# Patient Record
Sex: MALE | Race: BLACK OR AFRICAN AMERICAN | NOT HISPANIC OR LATINO | Employment: OTHER | URBAN - METROPOLITAN AREA
[De-identification: names, ages, dates, MRNs, and addresses within clinical notes are randomized per-mention and may not be internally consistent; named-entity substitution may affect disease eponyms.]

---

## 2017-03-20 ENCOUNTER — TELEPHONE (OUTPATIENT)
Dept: INTERNAL MEDICINE | Facility: CLINIC | Age: 27
End: 2017-03-20

## 2017-03-20 DIAGNOSIS — Z00.00 ROUTINE GENERAL MEDICAL EXAMINATION AT A HEALTH CARE FACILITY: Primary | ICD-10-CM

## 2017-04-12 ENCOUNTER — LAB VISIT (OUTPATIENT)
Dept: LAB | Facility: HOSPITAL | Age: 27
End: 2017-04-12
Attending: INTERNAL MEDICINE
Payer: MEDICAID

## 2017-04-12 DIAGNOSIS — Z00.00 ROUTINE GENERAL MEDICAL EXAMINATION AT A HEALTH CARE FACILITY: ICD-10-CM

## 2017-04-12 LAB
25(OH)D3+25(OH)D2 SERPL-MCNC: 21 NG/ML
ALBUMIN SERPL BCP-MCNC: 4.5 G/DL
ALP SERPL-CCNC: 43 U/L
ALT SERPL W/O P-5'-P-CCNC: 41 U/L
ANION GAP SERPL CALC-SCNC: 9 MMOL/L
ANISOCYTOSIS BLD QL SMEAR: SLIGHT
AST SERPL-CCNC: 25 U/L
BASOPHILS # BLD AUTO: 0.04 K/UL
BASOPHILS NFR BLD: 0.7 %
BILIRUB SERPL-MCNC: 0.6 MG/DL
BUN SERPL-MCNC: 13 MG/DL
CALCIUM SERPL-MCNC: 10.1 MG/DL
CHLORIDE SERPL-SCNC: 106 MMOL/L
CHOLEST/HDLC SERPL: 4.3 {RATIO}
CO2 SERPL-SCNC: 25 MMOL/L
CREAT SERPL-MCNC: 0.9 MG/DL
DACRYOCYTES BLD QL SMEAR: ABNORMAL
DIFFERENTIAL METHOD: ABNORMAL
EOSINOPHIL # BLD AUTO: 0.3 K/UL
EOSINOPHIL NFR BLD: 4.9 %
ERYTHROCYTE [DISTWIDTH] IN BLOOD BY AUTOMATED COUNT: 17.8 %
EST. GFR  (AFRICAN AMERICAN): >60 ML/MIN/1.73 M^2
EST. GFR  (NON AFRICAN AMERICAN): >60 ML/MIN/1.73 M^2
GLUCOSE SERPL-MCNC: 109 MG/DL
HCT VFR BLD AUTO: 36.5 %
HDL/CHOLESTEROL RATIO: 23.4 %
HDLC SERPL-MCNC: 197 MG/DL
HDLC SERPL-MCNC: 46 MG/DL
HGB BLD-MCNC: 11.8 G/DL
HYPOCHROMIA BLD QL SMEAR: ABNORMAL
LDLC SERPL CALC-MCNC: 123.6 MG/DL
LYMPHOCYTES # BLD AUTO: 2.8 K/UL
LYMPHOCYTES NFR BLD: 46.6 %
MCH RBC QN AUTO: 22.2 PG
MCHC RBC AUTO-ENTMCNC: 32.3 %
MCV RBC AUTO: 69 FL
MONOCYTES # BLD AUTO: 0.3 K/UL
MONOCYTES NFR BLD: 4.9 %
NEUTROPHILS # BLD AUTO: 2.5 K/UL
NEUTROPHILS NFR BLD: 42.9 %
NONHDLC SERPL-MCNC: 151 MG/DL
OVALOCYTES BLD QL SMEAR: ABNORMAL
PLATELET # BLD AUTO: 234 K/UL
PLATELET BLD QL SMEAR: ABNORMAL
PMV BLD AUTO: 10 FL
POIKILOCYTOSIS BLD QL SMEAR: SLIGHT
POTASSIUM SERPL-SCNC: 3.8 MMOL/L
PROT SERPL-MCNC: 8 G/DL
RBC # BLD AUTO: 5.31 M/UL
SODIUM SERPL-SCNC: 140 MMOL/L
STOMATOCYTES BLD QL SMEAR: PRESENT
TRIGL SERPL-MCNC: 137 MG/DL
TSH SERPL DL<=0.005 MIU/L-ACNC: 1.12 UIU/ML
WBC # BLD AUTO: 5.9 K/UL

## 2017-04-12 PROCEDURE — 82306 VITAMIN D 25 HYDROXY: CPT

## 2017-04-12 PROCEDURE — 80053 COMPREHEN METABOLIC PANEL: CPT

## 2017-04-12 PROCEDURE — 85025 COMPLETE CBC W/AUTO DIFF WBC: CPT

## 2017-04-12 PROCEDURE — 84443 ASSAY THYROID STIM HORMONE: CPT

## 2017-04-12 PROCEDURE — 83036 HEMOGLOBIN GLYCOSYLATED A1C: CPT

## 2017-04-12 PROCEDURE — 36415 COLL VENOUS BLD VENIPUNCTURE: CPT

## 2017-04-12 PROCEDURE — 80061 LIPID PANEL: CPT

## 2017-04-13 LAB
ESTIMATED AVG GLUCOSE: 126 MG/DL
HBA1C MFR BLD HPLC: 6 %

## 2017-04-17 RX ORDER — ATORVASTATIN CALCIUM 40 MG/1
TABLET, FILM COATED ORAL
Qty: 90 TABLET | Refills: 0 | Status: SHIPPED | OUTPATIENT
Start: 2017-04-17 | End: 2017-07-15 | Stop reason: SDUPTHER

## 2017-04-18 ENCOUNTER — OFFICE VISIT (OUTPATIENT)
Dept: INTERNAL MEDICINE | Facility: CLINIC | Age: 27
End: 2017-04-18
Payer: MEDICAID

## 2017-04-18 ENCOUNTER — HOSPITAL ENCOUNTER (OUTPATIENT)
Dept: RADIOLOGY | Facility: HOSPITAL | Age: 27
Discharge: HOME | End: 2017-04-18
Attending: STUDENT IN AN ORGANIZED HEALTH CARE EDUCATION/TRAINING PROGRAM
Payer: MEDICAID

## 2017-04-18 VITALS
TEMPERATURE: 99 F | WEIGHT: 270.06 LBS | SYSTOLIC BLOOD PRESSURE: 124 MMHG | RESPIRATION RATE: 16 BRPM | HEIGHT: 76 IN | BODY MASS INDEX: 32.89 KG/M2 | DIASTOLIC BLOOD PRESSURE: 81 MMHG | HEART RATE: 60 BPM

## 2017-04-18 DIAGNOSIS — E55.9 VITAMIN D DEFICIENCY: ICD-10-CM

## 2017-04-18 DIAGNOSIS — Z00.00 ANNUAL PHYSICAL EXAM: Primary | ICD-10-CM

## 2017-04-18 DIAGNOSIS — M79.645 THUMB PAIN, LEFT: ICD-10-CM

## 2017-04-18 DIAGNOSIS — L30.9 ECZEMA, UNSPECIFIED TYPE: ICD-10-CM

## 2017-04-18 DIAGNOSIS — D64.9 ANEMIA, UNSPECIFIED TYPE: ICD-10-CM

## 2017-04-18 PROCEDURE — 73130 X-RAY EXAM OF HAND: CPT | Mod: TC,PO,LT

## 2017-04-18 PROCEDURE — 99395 PREV VISIT EST AGE 18-39: CPT | Mod: S$PBB,,, | Performed by: STUDENT IN AN ORGANIZED HEALTH CARE EDUCATION/TRAINING PROGRAM

## 2017-04-18 PROCEDURE — 99999 PR PBB SHADOW E&M-EST. PATIENT-LVL III: CPT | Mod: PBBFAC,,,

## 2017-04-18 PROCEDURE — 73130 X-RAY EXAM OF HAND: CPT | Mod: 26,LT,, | Performed by: RADIOLOGY

## 2017-04-18 NOTE — PROGRESS NOTES
Patient seen and examined with resident and agree with assessment and plan.    1.  Annual - reviewed blood with patient.  Up-to-date on vaccines.  No need for colon or prostate cancer screening.  2.  Vit D - vitamin D 2000 units daily.  3.  Anemia - iron studies ordered, check thalassemia and sickle cell screens.  4.  Eczema - refer to dermatology.  Does not seem to be consistent with eczema.  5.  Thumb pain, left - check x-ray left hand.    Return to clinic in 1 year or sooner if needed.

## 2017-04-18 NOTE — PROGRESS NOTES
26 y.o.  Male here for annual exam.     Cholesterol: (2017)  Vaccines: Influenza (unknown); Tetanus (2010)  Sexual Screening: NA  STD screening: NA  Eye exam: will get eye exam soon (hasnt had one in 12 months)  HbA1c: 6.0%    Exercise: Patient states that he has never been a big exercise fan  Diet: Cutting back on portions       ROS  Constitutional: denies unintentional weight loss, night sweats, fever or chills  Eyes: denies visual changes  ENT: denies nasal congestion, ear pain or sore throat  Respiratory: denies cough, dyspnea on exertion and pleurisy  Cardiovascular: denies chest pain, chest pressure/discomfort, dyspnea, exertional chest pressure/discomfort, palpitations, lower extremity edema, orthopnea and palpitations  Gastrointestinal: denies nausea or vomiting, abdominal pain or change in bowel habits  Genitourinary: denies hematuria or dysuria  Musculoskeletal: arthralgias of the L Thumb Or denies myalgias  Neurological: denies seizures or tremors  Behavioral/Psych: denies auditory or visual hallucinations, SI, HI     Past Medical History:   Diagnosis Date    Diabetes mellitus, type 2     Hyperlipidemia     Hypertension     Obesity      No past surgical history on file.  Social History     Social History    Marital status: Single     Spouse name: N/A    Number of children: N/A    Years of education: N/A     Occupational History    Not on file.     Social History Main Topics    Smoking status: Never Smoker    Smokeless tobacco: Never Used    Alcohol use No    Drug use: No    Sexual activity: Not on file     Other Topics Concern    Not on file     Social History Narrative     Review of patient's allergies indicates:  No Known Allergies  Mr. Wynne does not currently have medications on file.     Physical Exam  Constitutional: Well-developed, Obese young man in non-distressed,   HENT: NC/AT, external ears normal, oropharynx clear, MMM w/o exudates.   Eyes: PERRL, EOMI, conjunctiva normal, no  discharge b/l, no scleral icterus   Neck: normal ROM, supple, (-) thyromegaly/nodules, (-) JVD,(-) cervical adenopathy   CV: RRR, no m/r/g, no carotid bruits, +2 peripheral pulses.  Pulmonary/Chest wall: Breathing comfortably w/o distress, CTAB, no w/r/r, no crackles.  GI: Soft, non-tender, non-distended, (+) BS, (+) BM   Musculoskeletal: Normal ROM, no edema, no atrophy, no tenderness throughout   Neurological: AAO x 4, CN II-XI in tact, nl sensation, nl strength/tone   Skin: warm, dry, (-) erythema, (-)pallor, (-) acanthosis.   Psych: minimal conversing, patient answered everything with yes or no, or gave extremely short explanations 2/2 to previous diagnosed mental retardation vs developmental disorder    ASSESSMENT AND PLAN    #Annual Physical Exam  -Patients vaccinations are UTD  -Blood work shows anemia and better controlled lipid profile  -repeat Fe studies, sickle cell screen and thal screen     #Hyperlipidemia  - CMP - shows no acute abnormalities  - Lipid panel is wnl  - Continue taking atorvastatin (LIPITOR) 40 MG tablet; Take 1 tablet (40 mg total) by mouth once daily.  - Encouraged continue healthy diet and exercising     #Vitamin D Deficiency  -patients VitD is 21  -will start pt on 2000U of VitD3 OTC      #Eczema  - continue using hydrocortisone 0.2 % cream; Apply topically daily as needed  -will refer patient to dermatologist for assessment of rash     #Anemia  - MVI containing iron seems to be slightly improving anemia  -continue MVI    #L Thumb pain  -patient states he jammed it >1year ago  -will obtain L hand moe Rader MD  Internal Medicine, PGY1  Pager [phone]

## 2017-04-18 NOTE — PATIENT INSTRUCTIONS
Seun is to take 2000U of Vitamin D over the counter daily and to try to exercise 30 minutes, 1 time per week

## 2017-04-26 DIAGNOSIS — L30.9 ECZEMA: ICD-10-CM

## 2017-04-27 RX ORDER — HYDROCORTISONE VALERATE CREAM 2 MG/G
CREAM TOPICAL
Qty: 60 G | Refills: 5 | Status: SHIPPED | OUTPATIENT
Start: 2017-04-27 | End: 2017-10-10 | Stop reason: SDUPTHER

## 2017-04-27 NOTE — TELEPHONE ENCOUNTER
----- Message from Clive Figueredo MD sent at 4/18/2017  3:16 PM CDT -----  No abnormality noted to explain pain.

## 2017-04-28 ENCOUNTER — TELEPHONE (OUTPATIENT)
Dept: INTERNAL MEDICINE | Facility: CLINIC | Age: 27
End: 2017-04-28

## 2017-04-28 DIAGNOSIS — D64.9 ANEMIA, UNSPECIFIED TYPE: Primary | ICD-10-CM

## 2017-04-28 NOTE — TELEPHONE ENCOUNTER
Attempted first home number to speak with Seun.  No answer and voice mailbox was full.  Second number was called and mother (Alis Wynne) picked up.  I discussed with her Seun's iron panel results and how I felt that a hematology referral was appropriate to make sure the reason for Seun's anemia was not being missed.  Mother agreed to referral.  Ambulatory referral to hematology/oncology was made.  Pt's mother informed to call office with any further questions.

## 2017-05-09 ENCOUNTER — TELEPHONE (OUTPATIENT)
Dept: INTERNAL MEDICINE | Facility: CLINIC | Age: 27
End: 2017-05-09

## 2017-05-09 NOTE — TELEPHONE ENCOUNTER
I called Mr. Wynne's guardian, his mother [phone]/Alis on 05/09/17 at 1735.    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  This deletion is one of the most common alpha thalassemia mutations. It is often seen in people with , [place] , [place],  and Mediterranean heritage and is associated with mild microcytic anemia.     I also explained that single alpha globin gene deletions are typically clinically harmless but that this most likely accounts for his low hemoglobin (microcytic anemia). I also explained that this deletion can carry some reproductive risk (if Mr. Wynne's offspring co-inherits a  alpha thalassemia deletion). I stressed that this is harmless but to call back if she has any questions.    Irma Rader MD  Internal Medicine, PGY1  Pager [phone]

## 2017-06-12 ENCOUNTER — TELEPHONE (OUTPATIENT)
Dept: DERMATOLOGY | Facility: CLINIC | Age: 27
End: 2017-06-12

## 2017-07-17 RX ORDER — ATORVASTATIN CALCIUM 40 MG/1
TABLET, FILM COATED ORAL
Qty: 90 TABLET | Refills: 0 | Status: SHIPPED | OUTPATIENT
Start: 2017-07-17 | End: 2017-07-20 | Stop reason: SDUPTHER

## 2017-07-18 RX ORDER — ATORVASTATIN CALCIUM 40 MG/1
TABLET, FILM COATED ORAL
Qty: 90 TABLET | Refills: 0 | Status: SHIPPED | OUTPATIENT
Start: 2017-07-18 | End: 2017-07-20

## 2017-07-20 RX ORDER — ATORVASTATIN CALCIUM 40 MG/1
40 TABLET, FILM COATED ORAL DAILY
Qty: 90 TABLET | Refills: 3 | Status: SHIPPED | OUTPATIENT
Start: 2017-07-20 | End: 2018-07-13 | Stop reason: SDUPTHER

## 2017-07-20 NOTE — TELEPHONE ENCOUNTER
----- Message from Innaisabelle Randolphs sent at 7/20/2017  2:03 PM CDT -----  Contact: Alis/mother/[phone]      RX request - refill or new RX.  Is this a refill or new RX:  Refill  RX name and strength: atorvastatin (LIPITOR) 40 MG tablet  Directions: TAKE ONE TABLET BY MOUTH ONCE DAILY  Is this a 30 day or 90 day RX:  90  Pharmacy name and phone #: [facility] [place], [place] - [address] [place] [address]  Comments:  Please call and advise.       Thank you!!!

## 2017-08-09 ENCOUNTER — TELEPHONE (OUTPATIENT)
Dept: INTERNAL MEDICINE | Facility: CLINIC | Age: 27
End: 2017-08-09

## 2017-08-09 DIAGNOSIS — R21 RASH: Primary | ICD-10-CM

## 2017-08-09 NOTE — TELEPHONE ENCOUNTER
----- Message from Ryanne Minor sent at 8/9/2017  2:11 PM CDT -----  Contact: Mother Alis [phone]  Patient would like to get a referral.  Does the patient already have the specialty clinic appointment scheduled:  No  If yes, what date is the appointment scheduled:     Referral to what specialty:  Dermatology  Reason (be specific):  Rash on both legs and arms  Does the patient want the referral with a specific physician:  [facility]  Is this an [facility] or [facility]:  [facility]  Comments:

## 2017-10-10 DIAGNOSIS — L30.9 ECZEMA, UNSPECIFIED TYPE: ICD-10-CM

## 2017-10-10 RX ORDER — HYDROCORTISONE VALERATE CREAM 2 MG/G
CREAM TOPICAL
Qty: 60 G | Refills: 5 | Status: SHIPPED | OUTPATIENT
Start: 2017-10-10 | End: 2018-06-15

## 2018-04-20 ENCOUNTER — HOSPITAL ENCOUNTER (OUTPATIENT)
Dept: RADIOLOGY | Facility: HOSPITAL | Age: 28
Discharge: HOME | End: 2018-04-20
Attending: INTERNAL MEDICINE
Payer: MEDICAID

## 2018-04-20 ENCOUNTER — OFFICE VISIT (OUTPATIENT)
Dept: INTERNAL MEDICINE | Facility: CLINIC | Age: 28
End: 2018-04-20
Payer: MEDICAID

## 2018-04-20 VITALS
WEIGHT: 236.13 LBS | RESPIRATION RATE: 16 BRPM | DIASTOLIC BLOOD PRESSURE: 92 MMHG | SYSTOLIC BLOOD PRESSURE: 120 MMHG | TEMPERATURE: 98 F | HEIGHT: 76 IN | HEART RATE: 60 BPM | BODY MASS INDEX: 28.76 KG/M2

## 2018-04-20 DIAGNOSIS — Z00.00 ANNUAL PHYSICAL EXAM: Primary | ICD-10-CM

## 2018-04-20 DIAGNOSIS — F70 MILD MENTAL RETARDATION: ICD-10-CM

## 2018-04-20 DIAGNOSIS — G89.29 CHRONIC PAIN OF LEFT THUMB: ICD-10-CM

## 2018-04-20 DIAGNOSIS — M79.645 CHRONIC PAIN OF LEFT THUMB: ICD-10-CM

## 2018-04-20 DIAGNOSIS — R10.13 EPIGASTRIC ABDOMINAL PAIN: ICD-10-CM

## 2018-04-20 DIAGNOSIS — R68.81 EARLY SATIETY: ICD-10-CM

## 2018-04-20 DIAGNOSIS — R14.0 ABDOMINAL DISTENSION: ICD-10-CM

## 2018-04-20 DIAGNOSIS — E78.2 MIXED HYPERLIPIDEMIA: ICD-10-CM

## 2018-04-20 PROCEDURE — 73140 X-RAY EXAM OF FINGER(S): CPT | Mod: 26,LT,, | Performed by: RADIOLOGY

## 2018-04-20 PROCEDURE — 73140 X-RAY EXAM OF FINGER(S): CPT | Mod: TC,PO,LT

## 2018-04-20 PROCEDURE — 99999 PR PBB SHADOW E&M-EST. PATIENT-LVL IV: CPT | Mod: PBBFAC,,, | Performed by: INTERNAL MEDICINE

## 2018-04-20 PROCEDURE — 99214 OFFICE O/P EST MOD 30 MIN: CPT | Mod: PBBFAC,25,PO | Performed by: INTERNAL MEDICINE

## 2018-04-20 PROCEDURE — 99395 PREV VISIT EST AGE 18-39: CPT | Mod: S$PBB,,, | Performed by: INTERNAL MEDICINE

## 2018-04-20 NOTE — PROGRESS NOTES
Subjective:      Suen Wynne is a 27 y.o. male who presents for annual exam.  He is here with his mother Alis Wynne.    History of developmental delay, mild MR, mother mentioned being told patient has asperger syndrome. She is concerned that he is not interactive with family. He is very avoidant and does not speak to his mother very much.     Family History:  family history includes Cancer in his maternal aunt; Diabetes in his maternal grandfather; Hypertension in his father, maternal grandfather, mother, and paternal grandfather.    Health Maintenance:  Health Maintenance       Date Due Completion Date    Influenza Vaccine 08/01/2017 ---    TETANUS VACCINE 10/28/2020 10/28/2010        Eye exam: no issues  Tetanus: 10/2010    Body mass index is 28.74 kg/m².    Meds:   Current Outpatient Prescriptions:     atorvastatin (LIPITOR) 40 MG tablet, Take 1 tablet (40 mg total) by mouth once daily., Disp: 90 tablet, Rfl: 3    hydrocortisone (WESTCORT) 0.2 % cream, APPLY  CREAM TOPICALLY TO AFFECTED AREA ONCE DAILY AS NEEDED, Disp: 60 g, Rfl: 5    desonide (DESOWEN) 0.05 % cream, Apply topically 2 (two) times daily., Disp: 60 g, Rfl: 1    PMHx:   Past Medical History:   Diagnosis Date    Diabetes mellitus, type 2     Hyperlipidemia     Hypertension     Obesity        PSHx:   No past surgical history on file.    SocHx:   Social History     Social History    Marital status: Single     Spouse name: N/A    Number of children: N/A    Years of education: N/A     Social History Main Topics    Smoking status: Never Smoker    Smokeless tobacco: Never Used    Alcohol use No    Drug use: No    Sexual activity: Not Asked     Other Topics Concern    None     Social History Narrative    None       Review of Systems   Unable to perform ROS: Other (does not speak more than a few words)   Constitutional: Positive for appetite change (does not eat much because he feels full quickly). Negative for chills and fever.    HENT: Negative for congestion and sinus pressure.    Eyes: Negative for visual disturbance.   Respiratory: Negative for cough and shortness of breath.    Cardiovascular: Negative for chest pain.   Gastrointestinal: Positive for abdominal distention and abdominal pain. Negative for nausea and vomiting.   Musculoskeletal: Positive for arthralgias (left thumb pain). Negative for myalgias.   Skin: Negative for rash.   Neurological: Positive for headaches (relieved by tylenol). Negative for dizziness and weakness.   Psychiatric/Behavioral: The patient is nervous/anxious.        Objective:      Physical Exam   Constitutional: He is oriented to person, place, and time. Vital signs are normal. He appears well-developed and well-nourished. No distress.   HENT:   Head: Normocephalic and atraumatic.   Right Ear: Hearing, tympanic membrane, external ear and ear canal normal. Tympanic membrane is not erythematous and not bulging.   Left Ear: Hearing, tympanic membrane, external ear and ear canal normal. Tympanic membrane is not erythematous and not bulging.   Nose: Nose normal.   Mouth/Throat: Uvula is midline, oropharynx is clear and moist and mucous membranes are normal. No oropharyngeal exudate or posterior oropharyngeal erythema.   Eyes: Conjunctivae, EOM and lids are normal. Pupils are equal, round, and reactive to light. No scleral icterus.   Neck: Normal range of motion. Neck supple. No thyroid mass and no thyromegaly present.   Cardiovascular: Normal rate, regular rhythm, normal heart sounds, intact distal pulses and normal pulses.    No murmur heard.  Pulmonary/Chest: Effort normal and breath sounds normal. He has no wheezes.   Abdominal: Soft. Bowel sounds are normal. He exhibits no distension. There is tenderness in the epigastric area. There is no rigidity, no rebound and no guarding.   Musculoskeletal: Normal range of motion. He exhibits no edema.   Lymphadenopathy:     He has no cervical adenopathy.        Right:  No supraclavicular adenopathy present.        Left: No supraclavicular adenopathy present.   Neurological: He is alert and oriented to person, place, and time. He has normal reflexes. He displays normal reflexes. Coordination and gait normal.   Skin: Skin is warm, dry and intact. No rash noted.   Psychiatric: His mood appears anxious. He is noncommunicative.   Vitals reviewed.      Assessment:       1. Annual physical exam    2. Mixed hyperlipidemia    3. Epigastric abdominal pain    4. Early satiety    5. Abdominal distension    6. Chronic pain of left thumb    7. Mild mental retardation        Plan:       1. Annual physical exam  - CBC auto differential; Future  - Comprehensive metabolic panel; Future  - Hemoglobin A1c; Future  - Lipid panel; Future  - TSH; Future  - Vitamin D; Future    2. Mixed hyperlipidemia  - stable, continue lipitor  - Lipid panel; Future    3. Epigastric abdominal pain  - trial of pepcid daily for 1 week  - H. PYLORI ANTIBODY, IGG; Future  - Ambulatory Referral to Gastroenterology    4. Early satiety  - Ambulatory Referral to Gastroenterology    5. Abdominal distension  - Ambulatory Referral to Gastroenterology    6. Chronic pain of left thumb  - X-Ray Finger 2 or More Views Left; Future  - Ambulatory Referral to Orthopedics    7. Mild mental retardation  - mother is interested in further diagnosis resources to assist      RTC in 3 months or sooner if needed    Karla Arce MD

## 2018-04-23 ENCOUNTER — TELEPHONE (OUTPATIENT)
Dept: INTERNAL MEDICINE | Facility: CLINIC | Age: 28
End: 2018-04-23

## 2018-04-23 DIAGNOSIS — M79.644 CHRONIC PAIN OF RIGHT THUMB: Primary | ICD-10-CM

## 2018-04-23 DIAGNOSIS — G89.29 CHRONIC PAIN OF RIGHT THUMB: Primary | ICD-10-CM

## 2018-04-23 DIAGNOSIS — S69.91XS HAND INJURY, RIGHT, SEQUELA: ICD-10-CM

## 2018-04-23 NOTE — TELEPHONE ENCOUNTER
----- Message from Malia Barton sent at 4/23/2018 11:12 AM CDT -----  Patient's mother is wanting an Orthopedic external referral to [facility].  Once entered bring to me to get auth.  Please also include imaging, clinic notes related to diagnosis.    Chronic pain of left thumb [M79.645, G89.29]    ThanksAmarilys

## 2018-05-09 ENCOUNTER — INITIAL CONSULT (OUTPATIENT)
Dept: GASTROENTEROLOGY | Facility: CLINIC | Age: 28
End: 2018-05-09
Payer: MEDICAID

## 2018-05-09 ENCOUNTER — TELEPHONE (OUTPATIENT)
Dept: GASTROENTEROLOGY | Facility: CLINIC | Age: 28
End: 2018-05-09

## 2018-05-09 VITALS
BODY MASS INDEX: 28.13 KG/M2 | WEIGHT: 231 LBS | HEART RATE: 60 BPM | RESPIRATION RATE: 18 BRPM | DIASTOLIC BLOOD PRESSURE: 74 MMHG | HEIGHT: 76 IN | SYSTOLIC BLOOD PRESSURE: 106 MMHG

## 2018-05-09 DIAGNOSIS — K21.9 GASTROESOPHAGEAL REFLUX DISEASE, ESOPHAGITIS PRESENCE NOT SPECIFIED: ICD-10-CM

## 2018-05-09 DIAGNOSIS — R68.81 EARLY SATIETY: ICD-10-CM

## 2018-05-09 DIAGNOSIS — R10.13 DYSPEPSIA: ICD-10-CM

## 2018-05-09 DIAGNOSIS — R11.2 NAUSEA AND VOMITING, INTRACTABILITY OF VOMITING NOT SPECIFIED, UNSPECIFIED VOMITING TYPE: ICD-10-CM

## 2018-05-09 DIAGNOSIS — R10.13 ABDOMINAL PAIN, EPIGASTRIC: Primary | ICD-10-CM

## 2018-05-09 DIAGNOSIS — R63.4 WEIGHT LOSS: ICD-10-CM

## 2018-05-09 PROCEDURE — 99203 OFFICE O/P NEW LOW 30 MIN: CPT | Mod: S$GLB,,, | Performed by: NURSE PRACTITIONER

## 2018-05-09 RX ORDER — PANTOPRAZOLE SODIUM 40 MG/1
40 TABLET, DELAYED RELEASE ORAL DAILY
Qty: 30 TABLET | Refills: 3 | Status: ON HOLD | OUTPATIENT
Start: 2018-05-09 | End: 2018-06-19

## 2018-05-09 NOTE — PROGRESS NOTES
Subjective:       Patient ID: Seun Wynne is a 27 y.o. male.    Chief Complaint: Abdominal Pain; Nausea; Emesis; and feeling full right after eating a small amount    HPI  Presents with his mother who helps answer some questions.  He has mild mental retardation and possibly Asperger's.   Patient answers appropriately but slowly.  Reports epigastric pain and fullness after eating with early satiety for at least one year.  Weight loss of at least 40 pounds over the last year and according to records 70 pounds over the last two years.  Reports nausea and vomiting.  Indigestion and belching after eating.  Reports heartburn and acid reflux.  Uses pepcid complete without relief.  Denies dysphagia.  No bowel changes, blood with bowel movements or black stools. .  Maternal aunt with gallbladder cancer, otherwise no GI family history.  He has never had EGD or abdominal US.  Recent labs unremarkable.    Review of Systems   Constitutional: Positive for appetite change and unexpected weight change. Negative for activity change, fatigue and fever.   HENT: Positive for trouble swallowing.    Respiratory: Negative for shortness of breath.    Cardiovascular: Negative for chest pain.   Gastrointestinal: Positive for abdominal pain, nausea and vomiting. Negative for abdominal distention, blood in stool, constipation and diarrhea.   Genitourinary: Negative.    Musculoskeletal: Negative.    Skin: Negative.    Neurological: Negative.    Psychiatric/Behavioral: Negative.        Objective:      Physical Exam   Constitutional: He is oriented to person, place, and time. He appears well-developed and well-nourished. No distress.   Eyes: No scleral icterus.   Cardiovascular: Normal rate.    Pulmonary/Chest: Effort normal. No respiratory distress.   Abdominal: Soft. Bowel sounds are normal. He exhibits no distension and no mass. There is no tenderness (non tender with palpation, points to mid epigastrum as location of pain). There is  no guarding.   Musculoskeletal: Normal range of motion.   Neurological: He is alert and oriented to person, place, and time.   Skin: Skin is warm and dry. He is not diaphoretic.   Psychiatric: He has a normal mood and affect. Judgment and thought content normal. His speech is delayed. He is slowed.   Vitals reviewed.      Assessment:       1. Abdominal pain, epigastric    2. Early satiety    3. Nausea and vomiting, intractability of vomiting not specified, unspecified vomiting type        Plan:         Seun was seen today for abdominal pain, nausea, emesis and feeling full right after eating a small amount.    Diagnoses and all orders for this visit:    Abdominal pain, epigastric  -     US Abdomen Complete; Future    Early satiety  -     US Abdomen Complete; Future    Nausea and vomiting, intractability of vomiting not specified, unspecified vomiting type  -     US Abdomen Complete; Future    Other orders  -     pantoprazole (PROTONIX) 40 MG tablet; Take 1 tablet (40 mg total) by mouth once daily.  -     Case request GI: ESOPHAGOGASTRODUODENOSCOPY (EGD)    I have explained the planned procedures to the patient.The risks, benefits and alternatives of the procedure were also explained in detail. Patient verbalized understanding, all questions were answered. The patient agrees to proceed as planned    Will schedule EGD and abdominal US.  Change Pepcid to Pantoprazole.

## 2018-05-09 NOTE — TELEPHONE ENCOUNTER
Louise, Please put patient in the book for EGD on 6/19/18 [facility], instructions were given and explained.

## 2018-05-09 NOTE — LETTER
May 9, 2018      Karla Arce MD  [address] [place] [id number]           [facility]  [facility] [address] , [address]   [facility] [address]  Phone: [phone]  Fax: [phone]          Patient: Seun Wynne   MR Number: [id number]   YOB: 1990   Date of Visit: 5/9/2018       Dear Dr. Karla Arce:    Thank you for referring Seun Wynne to me for evaluation. Attached you will find relevant portions of my assessment and plan of care.    If you have questions, please do not hesitate to call me. I look forward to following Seun Wynne along with you.    Sincerely,    Sophia Clarke, [facility]    Enclosure  CC:  No Recipients    If you would like to receive this communication electronically, please contact [email or web address] or [phone] to request more information on [facility] access.    For providers and/or their staff who would like to refer a patient to [facility], please contact us through our one-stop-shop provider referral line, [facility], at [phone].    If you feel you have received this communication in error or would no longer like to receive these types of communications, please e-mail [email or web address]

## 2018-05-16 ENCOUNTER — HOSPITAL ENCOUNTER (OUTPATIENT)
Dept: RADIOLOGY | Facility: HOSPITAL | Age: 28
Discharge: HOME | End: 2018-05-16
Attending: NURSE PRACTITIONER
Payer: MEDICAID

## 2018-05-16 DIAGNOSIS — R11.2 NAUSEA AND VOMITING, INTRACTABILITY OF VOMITING NOT SPECIFIED, UNSPECIFIED VOMITING TYPE: ICD-10-CM

## 2018-05-16 DIAGNOSIS — R68.81 EARLY SATIETY: ICD-10-CM

## 2018-05-16 DIAGNOSIS — R10.13 ABDOMINAL PAIN, EPIGASTRIC: ICD-10-CM

## 2018-05-16 PROCEDURE — 76700 US EXAM ABDOM COMPLETE: CPT | Mod: TC

## 2018-05-16 PROCEDURE — 76700 US EXAM ABDOM COMPLETE: CPT | Mod: 26,,, | Performed by: RADIOLOGY

## 2018-05-17 ENCOUNTER — TELEPHONE (OUTPATIENT)
Dept: GASTROENTEROLOGY | Facility: CLINIC | Age: 28
End: 2018-05-17

## 2018-06-19 PROBLEM — Z12.12 SCREENING FOR COLORECTAL CANCER: Status: ACTIVE | Noted: 2018-06-19

## 2018-06-19 PROBLEM — Z12.11 SCREENING FOR COLORECTAL CANCER: Status: ACTIVE | Noted: 2018-06-19

## 2018-06-28 ENCOUNTER — TELEPHONE (OUTPATIENT)
Dept: GASTROENTEROLOGY | Facility: CLINIC | Age: 28
End: 2018-06-28

## 2018-06-28 NOTE — TELEPHONE ENCOUNTER
----- Message from Kellen Dhillon MA sent at 6/27/2018 10:54 AM CDT -----      ----- Message -----  From: Darell Gautam Jr., MD  Sent: 6/26/2018   2:37 PM  To: Lotus Lozoya Staff ([place])    Biopsies are benign and normal  Rec routine f/u   Notify patient

## 2018-07-13 RX ORDER — ATORVASTATIN CALCIUM 40 MG/1
TABLET, FILM COATED ORAL
Qty: 90 TABLET | Refills: 0 | Status: SHIPPED | OUTPATIENT
Start: 2018-07-13 | End: 2018-10-10 | Stop reason: SDUPTHER

## 2018-07-18 ENCOUNTER — OFFICE VISIT (OUTPATIENT)
Dept: GASTROENTEROLOGY | Facility: CLINIC | Age: 28
End: 2018-07-18
Payer: MEDICAID

## 2018-07-18 VITALS
WEIGHT: 222.63 LBS | SYSTOLIC BLOOD PRESSURE: 117 MMHG | HEIGHT: 76 IN | BODY MASS INDEX: 27.11 KG/M2 | DIASTOLIC BLOOD PRESSURE: 82 MMHG

## 2018-07-18 DIAGNOSIS — R68.81 EARLY SATIETY: ICD-10-CM

## 2018-07-18 DIAGNOSIS — R63.4 WEIGHT LOSS: Primary | ICD-10-CM

## 2018-07-18 PROCEDURE — 99213 OFFICE O/P EST LOW 20 MIN: CPT | Mod: PBBFAC,PN | Performed by: NURSE PRACTITIONER

## 2018-07-18 PROCEDURE — 99999 PR PBB SHADOW E&M-EST. PATIENT-LVL III: CPT | Mod: PBBFAC,,, | Performed by: NURSE PRACTITIONER

## 2018-07-18 PROCEDURE — 99213 OFFICE O/P EST LOW 20 MIN: CPT | Mod: S$PBB,,, | Performed by: NURSE PRACTITIONER

## 2018-07-18 NOTE — PROGRESS NOTES
Subjective:       Patient ID: Seun Wynne is a 27 y.o. male.    Chief Complaint: Follow-up    HPI   Presents to follow up after recent EGD for epigastric pain and fullness, early satiety, reflux, nausea, vomiting, indigestion.  EGD and biopsies are normal.  US normal.  Labs unremarkable.  Weight loss of at least 40 pounds over the last year and according to records 70 pounds over the last two years. He has had an additional 14 pound weight loss since his visit one month ago.  He used pantoprazole for one month.  He reports today that his abdominal pain, nausea, vomiting, heartburn ,reflux and indigestion have resolved.  He continues to have early satiety with decreased intake at meals.    His mother is here with him and helps answer some questions.  He has mild mental retardation and possibly Asperger's.   Patient answers appropriately but slowly.    Denies bowel changes or urinary complaints.       Review of Systems   Constitutional: Positive for unexpected weight change. Negative for activity change, appetite change and fever.   Respiratory: Negative for shortness of breath.    Cardiovascular: Negative for chest pain.   Gastrointestinal: Positive for abdominal pain, nausea and vomiting. Negative for blood in stool, constipation and diarrhea.   Genitourinary: Negative.    Skin: Negative.    Neurological: Negative.        Objective:      Physical Exam   Constitutional: He appears well-developed and well-nourished. No distress.   Eyes: No scleral icterus.   Cardiovascular: Normal rate.    Pulmonary/Chest: Effort normal. No respiratory distress.   Abdominal: Soft. He exhibits no distension. There is no tenderness. There is guarding (denies tenderness but has some guarding of epigastrum and lower midabdomen).   Neurological: He is alert.   Skin: Skin is warm and dry. He is not diaphoretic.   Vitals reviewed.      Assessment:       1. Weight loss    2. Early satiety        Plan:         Seun was seen today for  follow-up.    Diagnoses and all orders for this visit:    Weight loss  -     CT Abdomen Pelvis W Wo Contrast; Future    Early satiety  -     CT Abdomen Pelvis W Wo Contrast; Future    Will schedule CT scan to ensure no worrisome pathology to account for significant weight loss.    If no findings, can follow up with PCP to continue to monitor weight.

## 2018-07-23 ENCOUNTER — HOSPITAL ENCOUNTER (OUTPATIENT)
Dept: RADIOLOGY | Facility: HOSPITAL | Age: 28
Discharge: HOME | End: 2018-07-23
Attending: NURSE PRACTITIONER
Payer: MEDICAID

## 2018-07-23 DIAGNOSIS — R68.81 EARLY SATIETY: ICD-10-CM

## 2018-07-23 DIAGNOSIS — R63.4 WEIGHT LOSS: ICD-10-CM

## 2018-07-23 PROCEDURE — 74178 CT ABD&PLV WO CNTR FLWD CNTR: CPT | Mod: 26,,, | Performed by: RADIOLOGY

## 2018-07-23 PROCEDURE — 74178 CT ABD&PLV WO CNTR FLWD CNTR: CPT | Mod: TC

## 2018-07-23 PROCEDURE — 25500020 PHARM REV CODE 255: Performed by: NURSE PRACTITIONER

## 2018-07-23 RX ADMIN — IOHEXOL 30 ML: 350 INJECTION, SOLUTION INTRAVENOUS at 10:07

## 2018-07-23 RX ADMIN — IOHEXOL 100 ML: 350 INJECTION, SOLUTION INTRAVENOUS at 11:07

## 2018-10-03 ENCOUNTER — PATIENT MESSAGE (OUTPATIENT)
Dept: GASTROENTEROLOGY | Facility: CLINIC | Age: 28
End: 2018-10-03

## 2018-10-03 ENCOUNTER — TELEPHONE (OUTPATIENT)
Dept: GASTROENTEROLOGY | Facility: CLINIC | Age: 28
End: 2018-10-03

## 2018-10-04 ENCOUNTER — TELEPHONE (OUTPATIENT)
Dept: GASTROENTEROLOGY | Facility: CLINIC | Age: 28
End: 2018-10-04

## 2018-10-04 DIAGNOSIS — R63.4 WEIGHT LOSS: ICD-10-CM

## 2018-10-04 DIAGNOSIS — R93.2 ABNORMAL LIVER CT: Primary | ICD-10-CM

## 2018-10-04 NOTE — TELEPHONE ENCOUNTER
Spoke with patient's mother to inform her that Sophia would like for her son Seun to get his CT Scan before his office visit on 10/24/2018.

## 2018-10-10 ENCOUNTER — HOSPITAL ENCOUNTER (OUTPATIENT)
Dept: RADIOLOGY | Facility: HOSPITAL | Age: 28
Discharge: HOME | End: 2018-10-10
Attending: NURSE PRACTITIONER
Payer: MEDICAID

## 2018-10-10 DIAGNOSIS — R63.4 WEIGHT LOSS: ICD-10-CM

## 2018-10-10 DIAGNOSIS — R93.2 ABNORMAL LIVER CT: ICD-10-CM

## 2018-10-10 PROCEDURE — 25500020 PHARM REV CODE 255: Performed by: NURSE PRACTITIONER

## 2018-10-10 PROCEDURE — 74170 CT ABD WO CNTRST FLWD CNTRST: CPT | Mod: TC

## 2018-10-10 PROCEDURE — 74170 CT ABD WO CNTRST FLWD CNTRST: CPT | Mod: 26,,, | Performed by: RADIOLOGY

## 2018-10-10 RX ORDER — ATORVASTATIN CALCIUM 40 MG/1
TABLET, FILM COATED ORAL
Qty: 90 TABLET | Refills: 0 | Status: SHIPPED | OUTPATIENT
Start: 2018-10-10 | End: 2018-11-22 | Stop reason: ALTCHOICE

## 2018-10-10 RX ADMIN — IOHEXOL 100 ML: 350 INJECTION, SOLUTION INTRAVENOUS at 12:10

## 2018-10-10 RX ADMIN — IOHEXOL 30 ML: 350 INJECTION, SOLUTION INTRAVENOUS at 10:10

## 2018-10-12 ENCOUNTER — TELEPHONE (OUTPATIENT)
Dept: GASTROENTEROLOGY | Facility: CLINIC | Age: 28
End: 2018-10-12

## 2018-10-12 NOTE — TELEPHONE ENCOUNTER
----- Message from TEVIN Aparicio sent at 10/12/2018  3:37 PM CDT -----  Let pt/mother know that CT shows a benign cyst in the liver but no cause for his weight loss.  He needs to FU with his PCP to further discuss weight loss

## 2018-10-12 NOTE — TELEPHONE ENCOUNTER
Spoke with patients mother and informed her of CT results. Patients appt has been canceled with Sophia for a f/u. Verbal Understanding.

## 2018-11-13 ENCOUNTER — OFFICE VISIT (OUTPATIENT)
Dept: INTERNAL MEDICINE | Facility: CLINIC | Age: 28
End: 2018-11-13
Payer: MEDICAID

## 2018-11-13 ENCOUNTER — LAB VISIT (OUTPATIENT)
Dept: LAB | Facility: HOSPITAL | Age: 28
End: 2018-11-13
Attending: INTERNAL MEDICINE
Payer: MEDICAID

## 2018-11-13 VITALS
HEART RATE: 58 BPM | WEIGHT: 230.38 LBS | BODY MASS INDEX: 28.05 KG/M2 | TEMPERATURE: 98 F | SYSTOLIC BLOOD PRESSURE: 128 MMHG | HEIGHT: 76 IN | DIASTOLIC BLOOD PRESSURE: 86 MMHG

## 2018-11-13 DIAGNOSIS — F84.9 PERVASIVE DEVELOPMENTAL DISORDER: ICD-10-CM

## 2018-11-13 DIAGNOSIS — F70 MILD MENTAL RETARDATION: ICD-10-CM

## 2018-11-13 DIAGNOSIS — R79.89 ABNORMAL CBC: ICD-10-CM

## 2018-11-13 DIAGNOSIS — E78.2 MIXED HYPERLIPIDEMIA: Primary | ICD-10-CM

## 2018-11-13 DIAGNOSIS — E78.2 MIXED HYPERLIPIDEMIA: ICD-10-CM

## 2018-11-13 DIAGNOSIS — R63.4 WEIGHT LOSS: ICD-10-CM

## 2018-11-13 LAB
ALBUMIN SERPL BCP-MCNC: 4.3 G/DL
ALP SERPL-CCNC: 45 U/L
ALT SERPL W/O P-5'-P-CCNC: 41 U/L
ANION GAP SERPL CALC-SCNC: 9 MMOL/L
AST SERPL-CCNC: 25 U/L
BASOPHILS # BLD AUTO: 0.04 K/UL
BASOPHILS NFR BLD: 0.7 %
BILIRUB SERPL-MCNC: 0.5 MG/DL
BUN SERPL-MCNC: 16 MG/DL
CALCIUM SERPL-MCNC: 10.4 MG/DL
CHLORIDE SERPL-SCNC: 105 MMOL/L
CHOLEST SERPL-MCNC: 244 MG/DL
CHOLEST/HDLC SERPL: 5.2 {RATIO}
CO2 SERPL-SCNC: 28 MMOL/L
CREAT SERPL-MCNC: 0.9 MG/DL
DIFFERENTIAL METHOD: ABNORMAL
EOSINOPHIL # BLD AUTO: 0.3 K/UL
EOSINOPHIL NFR BLD: 4.6 %
ERYTHROCYTE [DISTWIDTH] IN BLOOD BY AUTOMATED COUNT: 15.8 %
EST. GFR  (AFRICAN AMERICAN): >60 ML/MIN/1.73 M^2
EST. GFR  (NON AFRICAN AMERICAN): >60 ML/MIN/1.73 M^2
FERRITIN SERPL-MCNC: 349 NG/ML
FOLATE SERPL-MCNC: 12.6 NG/ML
GLUCOSE SERPL-MCNC: 88 MG/DL
HCT VFR BLD AUTO: 38.5 %
HDLC SERPL-MCNC: 47 MG/DL
HDLC SERPL: 19.3 %
HGB BLD-MCNC: 11.9 G/DL
IMM GRANULOCYTES # BLD AUTO: 0.03 K/UL
IMM GRANULOCYTES NFR BLD AUTO: 0.5 %
IRON SERPL-MCNC: 43 UG/DL
LDLC SERPL CALC-MCNC: 130.2 MG/DL
LYMPHOCYTES # BLD AUTO: 2.4 K/UL
LYMPHOCYTES NFR BLD: 38.8 %
MCH RBC QN AUTO: 23.1 PG
MCHC RBC AUTO-ENTMCNC: 30.9 G/DL
MCV RBC AUTO: 75 FL
MONOCYTES # BLD AUTO: 0.4 K/UL
MONOCYTES NFR BLD: 6.7 %
NEUTROPHILS # BLD AUTO: 3 K/UL
NEUTROPHILS NFR BLD: 48.7 %
NONHDLC SERPL-MCNC: 197 MG/DL
NRBC BLD-RTO: 0 /100 WBC
PLATELET # BLD AUTO: 215 K/UL
PMV BLD AUTO: 10.7 FL
POTASSIUM SERPL-SCNC: 3.8 MMOL/L
PROT SERPL-MCNC: 8 G/DL
RBC # BLD AUTO: 5.16 M/UL
SATURATED IRON: 15 %
SODIUM SERPL-SCNC: 142 MMOL/L
TOTAL IRON BINDING CAPACITY: 286 UG/DL
TRANSFERRIN SERPL-MCNC: 193 MG/DL
TRIGL SERPL-MCNC: 334 MG/DL
VIT B12 SERPL-MCNC: 336 PG/ML
WBC # BLD AUTO: 6.13 K/UL

## 2018-11-13 PROCEDURE — 80053 COMPREHEN METABOLIC PANEL: CPT

## 2018-11-13 PROCEDURE — 80061 LIPID PANEL: CPT

## 2018-11-13 PROCEDURE — 85025 COMPLETE CBC W/AUTO DIFF WBC: CPT

## 2018-11-13 PROCEDURE — 82607 VITAMIN B-12: CPT

## 2018-11-13 PROCEDURE — 99214 OFFICE O/P EST MOD 30 MIN: CPT | Mod: PBBFAC,PO | Performed by: INTERNAL MEDICINE

## 2018-11-13 PROCEDURE — 36415 COLL VENOUS BLD VENIPUNCTURE: CPT | Mod: PO

## 2018-11-13 PROCEDURE — 82746 ASSAY OF FOLIC ACID SERUM: CPT

## 2018-11-13 PROCEDURE — 99999 PR PBB SHADOW E&M-EST. PATIENT-LVL IV: CPT | Mod: PBBFAC,,, | Performed by: INTERNAL MEDICINE

## 2018-11-13 PROCEDURE — 83540 ASSAY OF IRON: CPT

## 2018-11-13 PROCEDURE — 99214 OFFICE O/P EST MOD 30 MIN: CPT | Mod: S$PBB,,, | Performed by: INTERNAL MEDICINE

## 2018-11-13 PROCEDURE — 82728 ASSAY OF FERRITIN: CPT

## 2018-11-13 NOTE — PROGRESS NOTES
Subjective:       Patient ID: Seun Wynne is a 28 y.o. male who presents for Follow-up; Hyperlipidemia; Anemia; and Weight Loss  Patient requested mother to leave during exam.    Hyperlipidemia   This is a chronic problem. The current episode started more than 1 month ago. Recent lipid tests were reviewed and are high. He has no history of diabetes, hypothyroidism or liver disease. There are no known factors aggravating his hyperlipidemia. Pertinent negatives include no chest pain or myalgias. Current antihyperlipidemic treatment includes statins. Compliance problems include psychosocial issues.  Risk factors for coronary artery disease include dyslipidemia and family history.   Anemia   Presents for follow-up visit. There has been no abdominal pain or confusion. Past treatments include nothing. There is no history of hypothyroidism, recent surgery or recent trauma.      Lost 6 lbs since last appointment. GI evaluation was unremarkable. Unclear of food intake. Has not been compliant with suggestion of drinking protein shakes.     Per old notes from 2012, patient's behavior was appropriate and he was responsive to questions. He was treated for diabetes and took metformin.in the old notes, patient admitted to checking blood sugars occasionally. Other notes indicate that patient was very reserved.       Review of Systems   Unable to perform ROS: Other (mild MR, possible developmental disease)   Cardiovascular: Negative for chest pain.   Gastrointestinal: Negative for abdominal pain, nausea and vomiting.   Musculoskeletal: Negative for myalgias.   Neurological: Negative for headaches.   Psychiatric/Behavioral: Negative for confusion.       Objective:      Physical Exam   Constitutional: He appears well-developed and well-nourished. He appears listless.   HENT:   Head: Normocephalic and atraumatic.   Right Ear: External ear normal.   Left Ear: External ear normal.   Nose: Nose normal.   Eyes: Lids are normal.    Neck: Full passive range of motion without pain.   Cardiovascular: Normal rate and regular rhythm.   No murmur heard.  Pulmonary/Chest: Effort normal and breath sounds normal. He has no wheezes.   Abdominal: Soft. Bowel sounds are normal. He exhibits no distension. There is no tenderness.   Musculoskeletal: Normal range of motion.   Neurological: He appears listless.   Skin: Skin is warm, dry and intact.   Psychiatric: His affect is blunt. His speech is delayed. He is noncommunicative.       Assessment/Plan:       1. Mixed hyperlipidemia  - Comprehensive metabolic panel; Future  - Lipid panel; Future  - reports compliance with Lipitor    2. Abnormal CBC  - CBC auto differential; Future  - Folate; Future  - Ferritin; Future  - Iron and TIBC; Future  - Vitamin B12; Future    3. Weight loss  - discussed session with dietician, referral entered    4. Pervasive developmental disorder  - will determine if there is a developmental facility that can further evaluate an adult  - Case Management order entered for help with resources    5. Mild MR    RTC in 3 months or sooner if needed    Karla Arce MD

## 2018-11-15 ENCOUNTER — TELEPHONE (OUTPATIENT)
Dept: INTERNAL MEDICINE | Facility: CLINIC | Age: 28
End: 2018-11-15

## 2018-11-15 NOTE — TELEPHONE ENCOUNTER
----- Message from Karla Arce MD sent at 11/14/2018 11:11 PM CST -----  Message sent via patient portal.    - please call mother Alis Wynne to notify of results

## 2018-11-16 ENCOUNTER — OUTPATIENT CASE MANAGEMENT (OUTPATIENT)
Dept: ADMINISTRATIVE | Facility: OTHER | Age: 28
End: 2018-11-16

## 2018-11-16 ENCOUNTER — TELEPHONE (OUTPATIENT)
Dept: INTERNAL MEDICINE | Facility: CLINIC | Age: 28
End: 2018-11-16

## 2018-11-16 NOTE — PROGRESS NOTES
Please note the following patient's information was forwarded to the Medicaid Case Management office on 11/16/18.    Please contact Outpatient Complex Care Management at ext [phone] with any questions.    Thank you,    Amanda Mcintosh, [facility]  Outpatient Care Mgmt.  [phone]

## 2018-11-16 NOTE — TELEPHONE ENCOUNTER
----- Message from Amanda Mcintosh sent at 11/16/2018 11:51 AM CST -----  Please note the following patient's information was forwarded to the Medicaid Case Management office on 11/16/18.     Please contact Outpatient Complex Care Management at ext [phone] with any questions.     Thank you,     Amanda Mcintosh, [facility]  Outpatient Care Mgmt.  [phone]

## 2018-11-19 ENCOUNTER — TELEPHONE (OUTPATIENT)
Dept: INTERNAL MEDICINE | Facility: CLINIC | Age: 28
End: 2018-11-19

## 2018-11-20 ENCOUNTER — PATIENT MESSAGE (OUTPATIENT)
Dept: INTERNAL MEDICINE | Facility: CLINIC | Age: 28
End: 2018-11-20

## 2018-11-22 RX ORDER — ROSUVASTATIN CALCIUM 20 MG/1
20 TABLET, COATED ORAL DAILY
Qty: 90 TABLET | Refills: 0 | Status: SHIPPED | OUTPATIENT
Start: 2018-11-22 | End: 2018-11-30 | Stop reason: SDUPTHER

## 2018-11-23 DIAGNOSIS — F70 MILD MENTAL RETARDATION: ICD-10-CM

## 2018-11-23 DIAGNOSIS — F84.9 PERVASIVE DEVELOPMENTAL DISORDER: Primary | ICD-10-CM

## 2018-11-26 ENCOUNTER — TELEPHONE (OUTPATIENT)
Dept: INTERNAL MEDICINE | Facility: CLINIC | Age: 28
End: 2018-11-26

## 2018-11-26 NOTE — TELEPHONE ENCOUNTER
----- Message from Karla Arce MD sent at 11/23/2018 11:45 PM CST -----  Entered child development referral, please arrange. Dr. Bojoruqez in Peds is aware.

## 2018-11-27 ENCOUNTER — TELEPHONE (OUTPATIENT)
Dept: INTERNAL MEDICINE | Facility: CLINIC | Age: 28
End: 2018-11-27

## 2018-11-27 NOTE — TELEPHONE ENCOUNTER
----- Message from Jackelyn Connors sent at 11/27/2018  9:14 AM CST -----  Contact: [phone]      ----- Message -----  From: Blanca Gates MA  Sent: 11/27/2018   9:08 AM  To: Jackelyn Connors    Sorvivek this pt is 28 years of age.  is a developmental pediatrician   ----- Message -----  From: Jackelyn Connors  Sent: 11/27/2018   8:49 AM  To: [facility]    Good morning, pt is being referred for mild retardation. Dr Ball asked me to message you regarding scheduling. Would you be able to assist?    Thanks!    Jackelyn

## 2018-11-28 ENCOUNTER — TELEPHONE (OUTPATIENT)
Dept: INTERNAL MEDICINE | Facility: CLINIC | Age: 28
End: 2018-11-28

## 2018-11-28 NOTE — TELEPHONE ENCOUNTER
P/A for Crestor (Rouvistatin) 20mg is complete.     Status: APPROVED  ID#: [id number]  Rx#: [id number]    Rouvistatin 20mg  1 po qd  # 90  0 refills

## 2018-11-30 ENCOUNTER — PATIENT MESSAGE (OUTPATIENT)
Dept: INTERNAL MEDICINE | Facility: CLINIC | Age: 28
End: 2018-11-30

## 2018-11-30 RX ORDER — ROSUVASTATIN CALCIUM 20 MG/1
20 TABLET, COATED ORAL DAILY
Qty: 90 TABLET | Refills: 0 | Status: SHIPPED | OUTPATIENT
Start: 2018-11-30 | End: 2019-03-11 | Stop reason: SDUPTHER

## 2018-11-30 NOTE — TELEPHONE ENCOUNTER
Informed pt via portal that p/a for crestor has been completed and approved by insurance.     Loading up and routing to physician in the event that another erx is necessary. Please approve and send to pharmacy. Thank you.

## 2018-12-08 ENCOUNTER — PATIENT MESSAGE (OUTPATIENT)
Dept: INTERNAL MEDICINE | Facility: CLINIC | Age: 28
End: 2018-12-08

## 2018-12-10 NOTE — TELEPHONE ENCOUNTER
Please assist pt with scheduling of dietician referral as they were not able to make initial consultation. Thank you.

## 2018-12-31 ENCOUNTER — NUTRITION (OUTPATIENT)
Dept: NUTRITION | Facility: CLINIC | Age: 28
End: 2018-12-31
Payer: MEDICAID

## 2018-12-31 VITALS — HEIGHT: 76 IN | BODY MASS INDEX: 28.05 KG/M2 | WEIGHT: 230.38 LBS

## 2018-12-31 DIAGNOSIS — Z71.3 DIETARY COUNSELING: ICD-10-CM

## 2018-12-31 DIAGNOSIS — R63.4 UNINTENTIONAL WEIGHT LOSS: Primary | ICD-10-CM

## 2018-12-31 PROCEDURE — 99212 OFFICE O/P EST SF 10 MIN: CPT | Mod: PBBFAC,PO

## 2018-12-31 PROCEDURE — 97802 MEDICAL NUTRITION INDIV IN: CPT | Mod: PBBFAC,PO,59 | Performed by: DIETITIAN, REGISTERED

## 2018-12-31 PROCEDURE — 99999 PR PBB SHADOW E&M-EST. PATIENT-LVL II: CPT | Mod: PBBFAC,,,

## 2018-12-31 NOTE — PROGRESS NOTES
"Referring Physician:Karla Arce MD     Reason for visit:  Chief Complaint   Patient presents with    Weight Loss    Nutrition Counseling      Initial Visit    :1990     Allergies Reviewed  Meds Reviewed    Anthropometrics  Weight:104.5 kg (230 lb 6.1 oz)  Height:6' 4" (1.93 m)  BMI:Body mass index is 28.04 kg/m².   IBW:   91.8 kg  +/-10%    Meds:  Outpatient Medications Prior to Visit   Medication Sig Dispense Refill    cholecalciferol, vitamin D3, (VITAMIN D3) 2,000 unit Cap Take 1 capsule by mouth once daily.      multivitamin capsule Take 1 capsule by mouth once daily.      rosuvastatin (CRESTOR) 20 MG tablet Take 1 tablet (20 mg total) by mouth once daily. 90 tablet 0     No facility-administered medications prior to visit.        Food/Drug Interactions Noted:  n/a    Vitamins/Supplements/Herbs:  MVI; Vit D    Labs:   18  Alb  4.3   Gluc  88   Chol  244     HDL  47   LDL Chol  130.2     Nutrition Prescription:     2755 Kcals/day( 30 kcal/kg IBW),  73 g protein( 0.8 g/kg IBW)     Support System:    Pt lives with his mother & father    Diet Hx:   Pt with reported ~70 lb unintentional weight loss over past two years.  Per his mother, who accompanied him today and provided all information, pt is a very "picky" eater, who has nontraditional diet regimen.  Is awake from 3:30 am-8:30 am so he eats while his parents are asleep, and then retires to his room to watch TV alone the rest of the day.  Pt eats any kind of plain meat (without sauce/gravy), french fries, cheese, peanut butter, bananas, grapes, drinks chocolate milk, flavored sparkling water, Welchs grape juice. His mom states pt does give her a grocery list of preferred foods, and he will microwave a Hot Pocket or buttered popcorn if he likes.    Current activity level and/or physical limitations:    Stays in the house, watching TV    Motivation to make changes/anticipated barriers and/or expected adherence:    Pt was not " receptive to making any changes to increase calories in his current diet regimen.  Apparently his mother has tried different strategies without success.    Nutrition-Focus Physical Findings:    Pt appears well nourished; did not make eye contact with me during encounter      Assessment:   Pt's mom voiced concern with pt's unintentional weight loss over the past few years and his food preferences, although she stated enteral nutrition is not an option if weight loss continues.    Nutrition Diagnosis:  None at this time.      Recommendations:   Regular high calorie diet as tolerated.  Handouts provided:  Suggestions for Increasing Calories and Protein; Preparation Tips to Increase Calories; Food Choices to Add Calories and Protein    Strategies Implemented:  Allow pt to choose preferred foods    Consultation Time:25 minutes.  Communicated with referring healthcare provider:  Consult note available in pt's Epic chart per MD discretion  Follow Up:  Pt's mother provided with dietitian contact number and advised to call with questions or make future appointment if further intervention needed.

## 2019-01-22 ENCOUNTER — OFFICE VISIT (OUTPATIENT)
Dept: PEDIATRIC DEVELOPMENTAL SERVICES | Facility: CLINIC | Age: 29
End: 2019-01-22
Payer: MEDICAID

## 2019-01-22 VITALS
HEART RATE: 68 BPM | HEIGHT: 76 IN | WEIGHT: 227.94 LBS | DIASTOLIC BLOOD PRESSURE: 71 MMHG | BODY MASS INDEX: 27.76 KG/M2 | SYSTOLIC BLOOD PRESSURE: 118 MMHG

## 2019-01-22 DIAGNOSIS — F84.0 AUTISM SPECTRUM DISORDER: Primary | ICD-10-CM

## 2019-01-22 DIAGNOSIS — Z60.4 SOCIAL ISOLATION: ICD-10-CM

## 2019-01-22 DIAGNOSIS — F70 MILD MENTAL RETARDATION: ICD-10-CM

## 2019-01-22 PROCEDURE — 99999 PR PBB SHADOW E&M-EST. PATIENT-LVL III: ICD-10-PCS | Mod: PBBFAC,,, | Performed by: PEDIATRICS

## 2019-01-22 PROCEDURE — 99205 PR OFFICE/OUTPT VISIT, NEW, LEVL V, 60-74 MIN: ICD-10-PCS | Mod: S$PBB,,, | Performed by: PEDIATRICS

## 2019-01-22 PROCEDURE — 99213 OFFICE O/P EST LOW 20 MIN: CPT | Mod: PBBFAC | Performed by: PEDIATRICS

## 2019-01-22 PROCEDURE — 99999 PR PBB SHADOW E&M-EST. PATIENT-LVL III: CPT | Mod: PBBFAC,,, | Performed by: PEDIATRICS

## 2019-01-22 PROCEDURE — 99205 OFFICE O/P NEW HI 60 MIN: CPT | Mod: S$PBB,,, | Performed by: PEDIATRICS

## 2019-01-22 SDOH — SOCIAL DETERMINANTS OF HEALTH (SDOH): SOCIAL EXCLUSION AND REJECTION: Z60.4

## 2019-01-22 NOTE — PROGRESS NOTES
"  Dear Dr. Arce,      You referred 28 y.o. old Seun Wynne for evaluation of developmental behavioral problems and I saw him as a new patient on 1/22/2019.     HPI: Seun is here with his mother who provided the information for the initial consultation.       Reason for visit: Assistance with developmental-behavioral concerns with young adult with autism spectrum disorder and mild mental retardation    History:  Seun required speech therapy for dysfluency when he was in elementary school. He also was delayed in his speech and did not speak in sentences until 3 yo. He was in special education to help with communication skills.   Seun has been diagnosed with Asperger's Disorder when he was in middle school. He was diagnosed at [facility] through the psychiatry department.   He attended [employer] in [place], [place] and graduated with a certificate of completion. Mom says that he reads on about a 6th grade level, but struggles with math. He can do simple addition/subtraction, but not multiplication and does not have money concepts.  He has participated in the [employer] and worked with [facility] in the past, but discontinued due to Seun's lack of interest. He is not employed. He spends his time watching television or playing video games.  Family structure changed since his maternal aunt had a stroke and spends time in the front of the house in the family room. Seun now isolates himself to his room.   No friends.   He has enjoyed social situations during family gathering for holidays. He generally likes to speak only with one person.    History: Seun is struggling with social skills. During family gatherings, Seun refuses to interact with others. He sleeps during the day and is up at night.  He is often "grouchy" for no apparent reason, and can be short tempered when his parents.  He likes to watch Fast and furious, and enjoys TV about auto mechanics. He now has a Play Station " 4, and he enjoys Grand Theft Auto.  He likes cleaning, particularly the kitchen. He gets up around 3 am, and he likes to spotlessly clean the kitchen. He will wash towels.  He never has participated in any vocational training. During the school transition period, he did things with [employer], but did not stay with it. Parents did not know what direction to take.    He often talks to himself when he is alone.    He has good personal hygiene.     Aggressive Behaviors: none    Medical: when younger he had Type II diabetes, but he is fine since he lost weight. He recently lost more weight.    Repetitive behaviors: none    Sleep:  He generally goes to sleep around 5+ pm and wakes between 3-3:30 am, since his aunt move in.  He will respond to his aunt, but won't interact socially.  He never initiates a conversation or request, except if his television is not working. He will leave a note if a household supply is missing.      MEDICAL HISTORY (Past Medical and Current System Review) is negative for the following unless otherwise indicated below or in above history of present illness:      Continence problems:   Sleep problems: see above  Ear/Nose/Throat  Gastrointestinal:  Hematologic:  Cardiac:  Renal/urinary:  Allergies:  Dermatologic:  Visual: astigmatism. Won't wear his glasses  Asthma/Pulmonary:  Serious Infections:  Seizure or convulsion:   Endocrinologic:  Musculoskeletal:  Tics:  Head injury with loss of consciousness:   Meningitis or other brain/spine infections:  Other: Type II diabetes: resolved. Previously treated with glucophage - off it for years      HOSPITALIZATIONS:   None    SURGERIES:  None    PRIOR EVALUATIONS:   EEG: none    Neuroimaging: none    Metabolic/genetic testing: none        MEDICATIONS and doses:   Current Outpatient Medications   Medication Sig Dispense Refill    cholecalciferol, vitamin D3, (VITAMIN D3) 2,000 unit Cap Take 1 capsule by mouth once daily.      multivitamin capsule  "Take 1 capsule by mouth once daily.      rosuvastatin (CRESTOR) 20 MG tablet Take 1 tablet (20 mg total) by mouth once daily. 90 tablet 0     No current facility-administered medications for this visit.        ALLERGIES:  Patient has no known allergies.     DIET:  Regular    No birth history on file.    BIRTH HISTORY:   normal birth and delivery    FAMILY HISTORY   Family history is negative for the following diagnoses unless affected relatives are identified:  Hyperactivity or attention deficit   School or learning problems   Speech or language problems : brother with history of speech delay- resolved early  Cognitive disability  Seizures/Epilepsy   Autism/Pervasive Developmental Disorder  Tics or Tourette Disorder  Mental illness  Heart disease  Sudden death      Family History   Problem Relation Age of Onset    Hypertension Mother     Hypertension Father     Cancer Maternal Aunt         gallbladder    Diabetes Maternal Grandfather     Hypertension Maternal Grandfather     Hypertension Paternal Grandfather     Heart disease Neg Hx          SOCIAL HISTORY  Father:       Name: Jeff Wynne       Age: 66       Occupation: construction       Mother:       Name: Alis Wynne       Age: 65       Occupation: retired. Formerly a , and then retail         Brothers: 30 yo, lives outside of the home  Sisters: none  Living arrangements: lives with mom, dad and aunt  Stressful events:       PHYSICAL EXAM:  Vital signs: Blood pressure 118/71, pulse 68, height 6' 3.98" (1.93 m), weight 103.4 kg (227 lb 15.3 oz).    GENERAL: well-developed and well-nourished  BEHAVIORAL OBSERVATIONS: no eye contact. Occasional shy grin. Head shake for no. Whispered answer to question: What is your father's name ...age.  Clearly uncomfortable being here. Answered that he preferred to be alone rather than with others.  DYSMORPHIC FEATURES    None  Gait: normal  Tics: absent  Tremors: absent      Diagnostic impressions:  28 " year old  male with history of former diagnosis of Asperger's Disorder.  History of cognitive delays  Reclusive  Abnormal sleep/wake pattern    Plan:  Referred to [facility] and [facility] for reassessment of options. Enjoys cleaning, which might be a applicable skill.  Will discuss additional possible interventions/strategies with psychiatry to get Seun involved in other activities and to normalize sleep schedule    May need to know Seun's current level of function: Mom to bring in last school evaluation   Consider [place] or other skills assessment tool       X__Face to face time with this family was ? 60 minutes, and > 50% time was spent counseling [CPT 30368] and coordination of care.    Sincerely,      KAI TAVAREZ MD    Copy to:  Family of Seun Minor Wynne

## 2019-01-23 ENCOUNTER — TELEPHONE (OUTPATIENT)
Dept: PSYCHIATRY | Facility: CLINIC | Age: 29
End: 2019-01-23

## 2019-01-23 DIAGNOSIS — F41.9 ANXIETY: ICD-10-CM

## 2019-01-23 DIAGNOSIS — F84.0 AUTISM SPECTRUM DISORDER: Primary | ICD-10-CM

## 2019-02-09 ENCOUNTER — OFFICE VISIT (OUTPATIENT)
Dept: PSYCHIATRY | Facility: CLINIC | Age: 29
End: 2019-02-09
Payer: MEDICAID

## 2019-02-09 DIAGNOSIS — F84.0 AUTISM SPECTRUM DISORDER: Primary | ICD-10-CM

## 2019-02-09 DIAGNOSIS — F70 MILD INTELLECTUAL DISABILITY: ICD-10-CM

## 2019-02-09 PROCEDURE — 99999 PR PBB SHADOW E&M-EST. PATIENT-LVL II: CPT | Mod: PBBFAC,,, | Performed by: PSYCHIATRY & NEUROLOGY

## 2019-02-09 PROCEDURE — 99999 PR PBB SHADOW E&M-EST. PATIENT-LVL II: ICD-10-PCS | Mod: PBBFAC,,, | Performed by: PSYCHIATRY & NEUROLOGY

## 2019-02-09 PROCEDURE — 90792 PR PSYCHIATRIC DIAGNOSTIC EVALUATION W/MEDICAL SERVICES: ICD-10-PCS | Mod: AF,HB,S$PBB, | Performed by: PSYCHIATRY & NEUROLOGY

## 2019-02-09 PROCEDURE — 90792 PSYCH DIAG EVAL W/MED SRVCS: CPT | Mod: PBBFAC | Performed by: PSYCHIATRY & NEUROLOGY

## 2019-02-09 PROCEDURE — 99212 OFFICE O/P EST SF 10 MIN: CPT | Mod: PBBFAC | Performed by: PSYCHIATRY & NEUROLOGY

## 2019-02-09 PROCEDURE — 90792 PSYCH DIAG EVAL W/MED SRVCS: CPT | Mod: AF,HB,S$PBB, | Performed by: PSYCHIATRY & NEUROLOGY

## 2019-02-09 RX ORDER — ESCITALOPRAM OXALATE 10 MG/1
10 TABLET ORAL DAILY
Qty: 30 TABLET | Refills: 2 | Status: SHIPPED | OUTPATIENT
Start: 2019-02-09 | End: 2019-05-06 | Stop reason: SDUPTHER

## 2019-02-09 NOTE — PROGRESS NOTES
"Outpatient Psychiatry Initial Visit (MD/NP)    2/9/2019    Seun Wynne, a 28 y.o. male, presenting for initial evaluation visit. Met with patient and parents and sister    Reason for Encounter: Consult from [facility]. Patient complains of   Chief Complaint   Patient presents with    Anxiety     social avoidance    Autism spectrum disorder       History of Present Illness: lifelong developmental disability, now essentially housebound and has failed one trial of [place] [facility], apparently mostly due to anxiety. Here to discuss options for possible "meannigful day" engagement    Review Of Systems:     GENERAL:  No weight gain or loss  SKIN:  No rashes or lacerations  HEAD:  No headaches  EYES:  No exophthalmos, jaundice or blindness  EARS:  No dizziness, tinnitus or hearing loss  NOSE:  No changes in smell  MOUTH & THROAT:  No dyskinetic movements or obvious goiter  CHEST:  No shortness of breath, hyperventilation or cough  CARDIOVASCULAR:  No tachycardia or chest pain  ABDOMEN:  No nausea, vomiting, pain, constipation or diarrhea  URINARY:  No frequency, dysuria or sexual dysfunction  ENDOCRINE:  No polydipsia, polyuria  MUSCULOSKELETAL:  No pain or stiffness of the joints  NEUROLOGIC:  No weakness, sensory changes, seizures, confusion, memory loss, tremor or other abnormal movements    Current Evaluation:     Constitutional  Vitals:  Most recent vital signs, dated less than 90 days prior to this appointment, were reviewed.    There were no vitals filed for this visit.     General:  casually dressed, disheveled, obese     Musculoskeletal  Muscle Strength/Tone:  no tremor, no tic   Gait & Station:  non-ataxic, slow, lumbering     Psychiatric  Speech:  no latency; no press, soft, non-spontaneous   Mood & Affect:  anxious  mood-congruent, restricted   Thought Process:  poverty of thought   Associations:  not verbally productive enough to assess   Thought Content:  normal, no suicidality, no homicidality, " delusions, or paranoia   Insight:  poor awareness of illness   Judgement: impaired due to intellectual impairment   Orientation:  person, day of week, year   Memory: not tested   Language: able to name, able to repeat   Attention Span & Concentration:  distracted   Fund of Knowledge:  diminished       Laboratory Data  No visits with results within 1 Month(s) from this visit.   Latest known visit with results is:   Lab Visit on 11/13/2018   Component Date Value Ref Range Status    Sodium 11/13/2018 142  136 - 145 mmol/L Final    Potassium 11/13/2018 3.8  3.5 - 5.1 mmol/L Final    Chloride 11/13/2018 105  95 - 110 mmol/L Final    CO2 11/13/2018 28  23 - 29 mmol/L Final    Glucose 11/13/2018 88  70 - 110 mg/dL Final    BUN, Bld 11/13/2018 16  6 - 20 mg/dL Final    Creatinine 11/13/2018 0.9  0.5 - 1.4 mg/dL Final    Calcium 11/13/2018 10.4  8.7 - 10.5 mg/dL Final    Total Protein 11/13/2018 8.0  6.0 - 8.4 g/dL Final    Albumin 11/13/2018 4.3  3.5 - 5.2 g/dL Final    Total Bilirubin 11/13/2018 0.5  0.1 - 1.0 mg/dL Final    Alkaline Phosphatase 11/13/2018 45* 55 - 135 U/L Final    AST 11/13/2018 25  10 - 40 U/L Final    ALT 11/13/2018 41  10 - 44 U/L Final    Anion Gap 11/13/2018 9  8 - 16 mmol/L Final    eGFR if African American 11/13/2018 >60.0  >60 mL/min/1.73 m^2 Final    eGFR if non African American 11/13/2018 >60.0  >60 mL/min/1.73 m^2 Final    Cholesterol 11/13/2018 244* 120 - 199 mg/dL Final    Triglycerides 11/13/2018 334* 30 - 150 mg/dL Final    HDL 11/13/2018 47  40 - 75 mg/dL Final    LDL Cholesterol 11/13/2018 130.2  63.0 - 159.0 mg/dL Final    HDL/Chol Ratio 11/13/2018 19.3* 20.0 - 50.0 % Final    Total Cholesterol/HDL Ratio 11/13/2018 5.2* 2.0 - 5.0 Final    Non-HDL Cholesterol 11/13/2018 197  mg/dL Final    WBC 11/13/2018 6.13  3.90 - 12.70 K/uL Final    RBC 11/13/2018 5.16  4.60 - 6.20 M/uL Final    Hemoglobin 11/13/2018 11.9* 14.0 - 18.0 g/dL Final    Hematocrit 11/13/2018  38.5* 40.0 - 54.0 % Final    MCV 11/13/2018 75* 82 - 98 fL Final    MCH 11/13/2018 23.1* 27.0 - 31.0 pg Final    MCHC 11/13/2018 30.9* 32.0 - 36.0 g/dL Final    RDW 11/13/2018 15.8* 11.5 - 14.5 % Final    Platelets 11/13/2018 215  150 - 350 K/uL Final    MPV 11/13/2018 10.7  9.2 - 12.9 fL Final    Immature Granulocytes 11/13/2018 0.5  0.0 - 0.5 % Final    Gran # (ANC) 11/13/2018 3.0  1.8 - 7.7 K/uL Final    Immature Grans (Abs) 11/13/2018 0.03  0.00 - 0.04 K/uL Final    Lymph # 11/13/2018 2.4  1.0 - 4.8 K/uL Final    Mono # 11/13/2018 0.4  0.3 - 1.0 K/uL Final    Eos # 11/13/2018 0.3  0.0 - 0.5 K/uL Final    Baso # 11/13/2018 0.04  0.00 - 0.20 K/uL Final    nRBC 11/13/2018 0  0 /100 WBC Final    Gran% 11/13/2018 48.7  38.0 - 73.0 % Final    Lymph% 11/13/2018 38.8  18.0 - 48.0 % Final    Mono% 11/13/2018 6.7  4.0 - 15.0 % Final    Eosinophil% 11/13/2018 4.6  0.0 - 8.0 % Final    Basophil% 11/13/2018 0.7  0.0 - 1.9 % Final    Differential Method 11/13/2018 Automated   Final    Folate 11/13/2018 12.6  4.0 - 24.0 ng/mL Final    Ferritin 11/13/2018 349* 20.0 - 300.0 ng/mL Final    Iron 11/13/2018 43* 45 - 160 ug/dL Final    Transferrin 11/13/2018 193* 200 - 375 mg/dL Final    TIBC 11/13/2018 286  250 - 450 ug/dL Final    Saturated Iron 11/13/2018 15* 20 - 50 % Final    Vitamin B-12 11/13/2018 336  210 - 950 pg/mL Final         Medications  Outpatient Encounter Medications as of 2/9/2019   Medication Sig Dispense Refill    cholecalciferol, vitamin D3, (VITAMIN D3) 2,000 unit Cap Take 1 capsule by mouth once daily.      multivitamin capsule Take 1 capsule by mouth once daily.      rosuvastatin (CRESTOR) 20 MG tablet Take 1 tablet (20 mg total) by mouth once daily. 90 tablet 0     No facility-administered encounter medications on file as of 2/9/2019.            Assessment - Diagnosis - Goals:     Impression: anxiety and information deficit are areas where we my be able to help      ICD-10-CM  ICD-9-CM   1. Autism spectrum disorder F84.0 299.00   2. Mild intellectual disability F70 317   3. Severe social anxiety    Strengths and Liabilities: Strength: Patient has positive support network., Strength: Patient is stable.    Treatment Goals:  Specify outcomes written in observable, behavioral terms:   Anxiety: eliminating avoidance (specify of leaving house) and reducing physical symptoms of anxiety    Treatment Plan/Recommendations:   · Medication Management: The risks and benefits of medication were discussed with the patient. trial lexapro as ordered  · [facility]  [facility]      Return to Clinic: 6 weeks

## 2019-02-19 ENCOUNTER — OFFICE VISIT (OUTPATIENT)
Dept: INTERNAL MEDICINE | Facility: CLINIC | Age: 29
End: 2019-02-19
Payer: MEDICAID

## 2019-02-19 VITALS
DIASTOLIC BLOOD PRESSURE: 74 MMHG | RESPIRATION RATE: 16 BRPM | BODY MASS INDEX: 27.89 KG/M2 | WEIGHT: 229.06 LBS | SYSTOLIC BLOOD PRESSURE: 102 MMHG | TEMPERATURE: 98 F | HEIGHT: 76 IN | HEART RATE: 60 BPM | OXYGEN SATURATION: 99 %

## 2019-02-19 DIAGNOSIS — E78.2 MIXED HYPERLIPIDEMIA: Primary | ICD-10-CM

## 2019-02-19 DIAGNOSIS — F84.0 AUTISM SPECTRUM DISORDER: ICD-10-CM

## 2019-02-19 PROCEDURE — 99999 PR PBB SHADOW E&M-EST. PATIENT-LVL III: CPT | Mod: PBBFAC,,, | Performed by: INTERNAL MEDICINE

## 2019-02-19 PROCEDURE — 99213 OFFICE O/P EST LOW 20 MIN: CPT | Mod: S$PBB,,, | Performed by: INTERNAL MEDICINE

## 2019-02-19 PROCEDURE — 99213 OFFICE O/P EST LOW 20 MIN: CPT | Mod: PBBFAC,PO | Performed by: INTERNAL MEDICINE

## 2019-02-19 PROCEDURE — 99999 PR PBB SHADOW E&M-EST. PATIENT-LVL III: ICD-10-PCS | Mod: PBBFAC,,, | Performed by: INTERNAL MEDICINE

## 2019-02-19 PROCEDURE — 99213 PR OFFICE/OUTPT VISIT, EST, LEVL III, 20-29 MIN: ICD-10-PCS | Mod: S$PBB,,, | Performed by: INTERNAL MEDICINE

## 2019-02-19 NOTE — PROGRESS NOTES
Subjective:       Patient ID: Seun Wynne is a 28 y.o. male who presents for Follow-up (3 month follow up ) and Hyperlipidemia      Hyperlipidemia   This is a chronic problem. The current episode started more than 1 month ago. Exacerbating diseases include obesity. He has no history of chronic renal disease or hypothyroidism. There are no known factors aggravating his hyperlipidemia. Pertinent negatives include no chest pain or leg pain. Current antihyperlipidemic treatment includes statins. There are no compliance problems.         Review of Systems   Unable to perform ROS: Other (due to developmental issues)   Respiratory: Negative for cough.    Cardiovascular: Negative for chest pain.   Gastrointestinal: Negative for abdominal pain.   Neurological: Negative for headaches.       Objective:      Physical Exam   Constitutional: He appears well-developed and well-nourished.   HENT:   Head: Normocephalic and atraumatic.   Right Ear: Hearing and external ear normal.   Left Ear: Hearing and external ear normal.   Nose: Nose normal.   Mouth/Throat: Uvula is midline and oropharynx is clear and moist.   Eyes: Conjunctivae, EOM and lids are normal. Pupils are equal, round, and reactive to light.   Neck: Full passive range of motion without pain.   Cardiovascular: Normal rate and regular rhythm.   No murmur heard.  Pulmonary/Chest: Effort normal and breath sounds normal. He has no wheezes.   Abdominal: Soft. Bowel sounds are normal. He exhibits no distension. There is no tenderness.   Musculoskeletal: Normal range of motion.   Neurological: He is alert.   Skin: Skin is warm, dry and intact.   Psychiatric: His speech is delayed.       Assessment/Plan:       1. Mixed hyperlipidemia  - Comprehensive metabolic panel; Future  - CBC auto differential; Future  - Lipid panel; Future  - continue Crestor    2. Autism spectrum disorder  - evaluated by developmental specialist    Karla Arce MD

## 2019-02-20 ENCOUNTER — LAB VISIT (OUTPATIENT)
Dept: LAB | Facility: HOSPITAL | Age: 29
End: 2019-02-20
Attending: INTERNAL MEDICINE
Payer: MEDICAID

## 2019-02-20 DIAGNOSIS — E78.2 MIXED HYPERLIPIDEMIA: ICD-10-CM

## 2019-02-20 LAB
ALBUMIN SERPL BCP-MCNC: 4.5 G/DL
ALP SERPL-CCNC: 39 U/L
ALT SERPL W/O P-5'-P-CCNC: 40 U/L
ANION GAP SERPL CALC-SCNC: 8 MMOL/L
ANISOCYTOSIS BLD QL SMEAR: SLIGHT
AST SERPL-CCNC: 26 U/L
BASOPHILS # BLD AUTO: 0.03 K/UL
BASOPHILS NFR BLD: 0.7 %
BILIRUB SERPL-MCNC: 0.7 MG/DL
BUN SERPL-MCNC: 13 MG/DL
CALCIUM SERPL-MCNC: 10.1 MG/DL
CHLORIDE SERPL-SCNC: 102 MMOL/L
CHOLEST SERPL-MCNC: 206 MG/DL
CHOLEST/HDLC SERPL: 3.9 {RATIO}
CO2 SERPL-SCNC: 29 MMOL/L
CREAT SERPL-MCNC: 0.9 MG/DL
DIFFERENTIAL METHOD: ABNORMAL
EOSINOPHIL # BLD AUTO: 0.2 K/UL
EOSINOPHIL NFR BLD: 4.3 %
ERYTHROCYTE [DISTWIDTH] IN BLOOD BY AUTOMATED COUNT: 16.3 %
EST. GFR  (AFRICAN AMERICAN): >60 ML/MIN/1.73 M^2
EST. GFR  (NON AFRICAN AMERICAN): >60 ML/MIN/1.73 M^2
GLUCOSE SERPL-MCNC: 87 MG/DL
HCT VFR BLD AUTO: 35.9 %
HDLC SERPL-MCNC: 53 MG/DL
HDLC SERPL: 25.7 %
HGB BLD-MCNC: 11.1 G/DL
LDLC SERPL CALC-MCNC: 136.2 MG/DL
LYMPHOCYTES # BLD AUTO: 2 K/UL
LYMPHOCYTES NFR BLD: 44.3 %
MCH RBC QN AUTO: 22.1 PG
MCHC RBC AUTO-ENTMCNC: 30.9 G/DL
MCV RBC AUTO: 72 FL
MONOCYTES # BLD AUTO: 0.3 K/UL
MONOCYTES NFR BLD: 7.6 %
NEUTROPHILS # BLD AUTO: 1.9 K/UL
NEUTROPHILS NFR BLD: 43.1 %
NONHDLC SERPL-MCNC: 153 MG/DL
PLATELET # BLD AUTO: 235 K/UL
PLATELET BLD QL SMEAR: ABNORMAL
PMV BLD AUTO: 9.6 FL
POIKILOCYTOSIS BLD QL SMEAR: SLIGHT
POTASSIUM SERPL-SCNC: 4.2 MMOL/L
PROT SERPL-MCNC: 7.6 G/DL
RBC # BLD AUTO: 5.02 M/UL
SODIUM SERPL-SCNC: 139 MMOL/L
TRIGL SERPL-MCNC: 84 MG/DL
WBC # BLD AUTO: 4.47 K/UL

## 2019-02-20 PROCEDURE — 36415 COLL VENOUS BLD VENIPUNCTURE: CPT

## 2019-02-20 PROCEDURE — 80061 LIPID PANEL: CPT

## 2019-02-20 PROCEDURE — 85025 COMPLETE CBC W/AUTO DIFF WBC: CPT

## 2019-02-20 PROCEDURE — 80053 COMPREHEN METABOLIC PANEL: CPT

## 2019-03-11 RX ORDER — ROSUVASTATIN CALCIUM 20 MG/1
TABLET, COATED ORAL
Qty: 90 TABLET | Refills: 1 | Status: SHIPPED | OUTPATIENT
Start: 2019-03-11 | End: 2019-09-05 | Stop reason: SDUPTHER

## 2019-05-06 DIAGNOSIS — F84.0 AUTISM SPECTRUM DISORDER: ICD-10-CM

## 2019-05-06 RX ORDER — ESCITALOPRAM OXALATE 10 MG/1
TABLET ORAL
Qty: 30 TABLET | Refills: 2 | Status: SHIPPED | OUTPATIENT
Start: 2019-05-06 | End: 2019-08-04 | Stop reason: SDUPTHER

## 2019-07-03 ENCOUNTER — TELEPHONE (OUTPATIENT)
Dept: INTERNAL MEDICINE | Facility: CLINIC | Age: 29
End: 2019-07-03

## 2019-07-03 DIAGNOSIS — Z00.00 ANNUAL PHYSICAL EXAM: Primary | ICD-10-CM

## 2019-07-03 NOTE — TELEPHONE ENCOUNTER
----- Message from Clementina Farley sent at 7/3/2019  9:06 AM CDT -----  Contact: mother  Doctor appointment and lab have been scheduled.  Please link lab orders to the lab appointment.  Date of doctor appointment:  8/14  Date of lab appointment:  8/6  Physical or EP: Epp  Comments:

## 2019-07-31 DIAGNOSIS — F84.0 AUTISM SPECTRUM DISORDER: ICD-10-CM

## 2019-07-31 RX ORDER — ESCITALOPRAM OXALATE 10 MG/1
TABLET ORAL
Qty: 30 TABLET | Refills: 2 | OUTPATIENT
Start: 2019-07-31

## 2019-08-04 DIAGNOSIS — F84.0 AUTISM SPECTRUM DISORDER: ICD-10-CM

## 2019-08-05 RX ORDER — ESCITALOPRAM OXALATE 10 MG/1
TABLET ORAL
Qty: 30 TABLET | Refills: 2 | Status: SHIPPED | OUTPATIENT
Start: 2019-08-05 | End: 2019-10-23 | Stop reason: SDUPTHER

## 2019-08-06 ENCOUNTER — LAB VISIT (OUTPATIENT)
Dept: LAB | Facility: HOSPITAL | Age: 29
End: 2019-08-06
Attending: INTERNAL MEDICINE
Payer: MEDICAID

## 2019-08-06 DIAGNOSIS — Z00.00 ANNUAL PHYSICAL EXAM: ICD-10-CM

## 2019-08-06 LAB
25(OH)D3+25(OH)D2 SERPL-MCNC: 40 NG/ML (ref 30–96)
ALBUMIN SERPL BCP-MCNC: 4.7 G/DL (ref 3.5–5.2)
ALP SERPL-CCNC: 37 U/L (ref 55–135)
ALT SERPL W/O P-5'-P-CCNC: 40 U/L (ref 10–44)
ANION GAP SERPL CALC-SCNC: 9 MMOL/L (ref 8–16)
AST SERPL-CCNC: 27 U/L (ref 10–40)
BASOPHILS # BLD AUTO: 0.02 K/UL (ref 0–0.2)
BASOPHILS NFR BLD: 0.4 % (ref 0–1.9)
BILIRUB SERPL-MCNC: 0.6 MG/DL (ref 0.1–1)
BUN SERPL-MCNC: 13 MG/DL (ref 6–20)
CALCIUM SERPL-MCNC: 10.2 MG/DL (ref 8.7–10.5)
CHLORIDE SERPL-SCNC: 102 MMOL/L (ref 95–110)
CHOLEST SERPL-MCNC: 239 MG/DL (ref 120–199)
CHOLEST/HDLC SERPL: 4.3 {RATIO} (ref 2–5)
CO2 SERPL-SCNC: 29 MMOL/L (ref 23–29)
CREAT SERPL-MCNC: 0.9 MG/DL (ref 0.5–1.4)
DIFFERENTIAL METHOD: ABNORMAL
EOSINOPHIL # BLD AUTO: 0.2 K/UL (ref 0–0.5)
EOSINOPHIL NFR BLD: 4.8 % (ref 0–8)
ERYTHROCYTE [DISTWIDTH] IN BLOOD BY AUTOMATED COUNT: 17 % (ref 11.5–14.5)
EST. GFR  (AFRICAN AMERICAN): >60 ML/MIN/1.73 M^2
EST. GFR  (NON AFRICAN AMERICAN): >60 ML/MIN/1.73 M^2
ESTIMATED AVG GLUCOSE: 108 MG/DL (ref 68–131)
GLUCOSE SERPL-MCNC: 89 MG/DL (ref 70–110)
HBA1C MFR BLD HPLC: 5.4 % (ref 4–5.6)
HCT VFR BLD AUTO: 36 % (ref 40–54)
HDLC SERPL-MCNC: 56 MG/DL (ref 40–75)
HDLC SERPL: 23.4 % (ref 20–50)
HGB BLD-MCNC: 11.1 G/DL (ref 14–18)
LDLC SERPL CALC-MCNC: 156.6 MG/DL (ref 63–159)
LYMPHOCYTES # BLD AUTO: 2.3 K/UL (ref 1–4.8)
LYMPHOCYTES NFR BLD: 47.6 % (ref 18–48)
MCH RBC QN AUTO: 22.2 PG (ref 27–31)
MCHC RBC AUTO-ENTMCNC: 30.8 G/DL (ref 32–36)
MCV RBC AUTO: 72 FL (ref 82–98)
MONOCYTES # BLD AUTO: 0.3 K/UL (ref 0.3–1)
MONOCYTES NFR BLD: 6.7 % (ref 4–15)
NEUTROPHILS # BLD AUTO: 1.9 K/UL (ref 1.8–7.7)
NEUTROPHILS NFR BLD: 40.5 % (ref 38–73)
NONHDLC SERPL-MCNC: 183 MG/DL
PLATELET # BLD AUTO: 239 K/UL (ref 150–350)
PMV BLD AUTO: 10.1 FL (ref 9.2–12.9)
POTASSIUM SERPL-SCNC: 4 MMOL/L (ref 3.5–5.1)
PROT SERPL-MCNC: 7.7 G/DL (ref 6–8.4)
RBC # BLD AUTO: 5 M/UL (ref 4.6–6.2)
SODIUM SERPL-SCNC: 140 MMOL/L (ref 136–145)
TRIGL SERPL-MCNC: 132 MG/DL (ref 30–150)
TSH SERPL DL<=0.005 MIU/L-ACNC: 1.07 UIU/ML (ref 0.4–4)
WBC # BLD AUTO: 4.79 K/UL (ref 3.9–12.7)

## 2019-08-06 PROCEDURE — 80061 LIPID PANEL: CPT

## 2019-08-06 PROCEDURE — 82306 VITAMIN D 25 HYDROXY: CPT

## 2019-08-06 PROCEDURE — 83036 HEMOGLOBIN GLYCOSYLATED A1C: CPT

## 2019-08-06 PROCEDURE — 84443 ASSAY THYROID STIM HORMONE: CPT

## 2019-08-06 PROCEDURE — 80053 COMPREHEN METABOLIC PANEL: CPT

## 2019-08-06 PROCEDURE — 36415 COLL VENOUS BLD VENIPUNCTURE: CPT

## 2019-08-06 PROCEDURE — 85025 COMPLETE CBC W/AUTO DIFF WBC: CPT

## 2019-08-16 ENCOUNTER — TELEPHONE (OUTPATIENT)
Dept: INTERNAL MEDICINE | Facility: CLINIC | Age: 29
End: 2019-08-16

## 2019-08-16 NOTE — TELEPHONE ENCOUNTER
----- Message from Aida Ellis sent at 8/16/2019 11:59 AM CDT -----  Contact: Alis(Mother) [phone]  Patient's mother is calling to schedule an appointment for a physical. Patient has Medicaid. Please call and advise

## 2019-09-05 RX ORDER — ROSUVASTATIN CALCIUM 20 MG/1
TABLET, COATED ORAL
Qty: 90 TABLET | Refills: 1 | Status: SHIPPED | OUTPATIENT
Start: 2019-09-05 | End: 2019-09-09 | Stop reason: SDUPTHER

## 2019-09-09 RX ORDER — ROSUVASTATIN CALCIUM 20 MG/1
TABLET, COATED ORAL
Qty: 90 TABLET | Refills: 1 | Status: SHIPPED | OUTPATIENT
Start: 2019-09-09 | End: 2020-02-15 | Stop reason: SDUPTHER

## 2019-09-20 ENCOUNTER — OFFICE VISIT (OUTPATIENT)
Dept: INTERNAL MEDICINE | Facility: CLINIC | Age: 29
End: 2019-09-20
Payer: MEDICAID

## 2019-09-20 VITALS
HEART RATE: 62 BPM | HEIGHT: 76 IN | WEIGHT: 241.63 LBS | RESPIRATION RATE: 18 BRPM | DIASTOLIC BLOOD PRESSURE: 80 MMHG | SYSTOLIC BLOOD PRESSURE: 128 MMHG | BODY MASS INDEX: 29.42 KG/M2 | TEMPERATURE: 99 F

## 2019-09-20 DIAGNOSIS — F84.0 AUTISM SPECTRUM DISORDER: ICD-10-CM

## 2019-09-20 DIAGNOSIS — E78.2 MIXED HYPERLIPIDEMIA: Primary | ICD-10-CM

## 2019-09-20 DIAGNOSIS — D64.9 ANEMIA, UNSPECIFIED TYPE: ICD-10-CM

## 2019-09-20 DIAGNOSIS — F40.10 SOCIAL ANXIETY DISORDER: ICD-10-CM

## 2019-09-20 DIAGNOSIS — Z00.00 ANNUAL PHYSICAL EXAM: ICD-10-CM

## 2019-09-20 PROBLEM — E55.9 VITAMIN D DEFICIENCY: Status: RESOLVED | Noted: 2017-04-18 | Resolved: 2019-09-20

## 2019-09-20 PROBLEM — Z12.11 SCREENING FOR COLORECTAL CANCER: Status: RESOLVED | Noted: 2018-06-19 | Resolved: 2019-09-20

## 2019-09-20 PROBLEM — Z12.12 SCREENING FOR COLORECTAL CANCER: Status: RESOLVED | Noted: 2018-06-19 | Resolved: 2019-09-20

## 2019-09-20 PROCEDURE — 99214 OFFICE O/P EST MOD 30 MIN: CPT | Mod: PBBFAC,PO | Performed by: INTERNAL MEDICINE

## 2019-09-20 PROCEDURE — 99999 PR PBB SHADOW E&M-EST. PATIENT-LVL IV: ICD-10-PCS | Mod: PBBFAC,,, | Performed by: INTERNAL MEDICINE

## 2019-09-20 PROCEDURE — 99999 PR PBB SHADOW E&M-EST. PATIENT-LVL IV: CPT | Mod: PBBFAC,,, | Performed by: INTERNAL MEDICINE

## 2019-09-20 PROCEDURE — 99214 OFFICE O/P EST MOD 30 MIN: CPT | Mod: S$PBB,,, | Performed by: INTERNAL MEDICINE

## 2019-09-20 PROCEDURE — 99214 PR OFFICE/OUTPT VISIT, EST, LEVL IV, 30-39 MIN: ICD-10-PCS | Mod: S$PBB,,, | Performed by: INTERNAL MEDICINE

## 2019-09-20 RX ORDER — ATORVASTATIN CALCIUM 40 MG/1
40 TABLET, FILM COATED ORAL
COMMUNITY
End: 2019-09-20 | Stop reason: ALTCHOICE

## 2019-09-20 NOTE — PROGRESS NOTES
"Subjective:      Seun Wynne is a 29 y.o. male who presents for annual exam.  He is present with his mother Alis Wynne.    Family History:  family history includes Cancer in his maternal aunt; Diabetes in his maternal grandfather; Hypertension in his father, maternal grandfather, mother, and paternal grandfather.    Health Maintenance:  Health Maintenance       Date Due Completion Date    Influenza Vaccine (1) 09/01/2019 ---    TETANUS VACCINE 10/28/2020 10/28/2010        Eye exam: in last few years, prescribed glasses but does not wear them  Dental Exam: every 6 months    Influenza: declines  Tetanus: 2010    Body mass index is 29.41 kg/m².    Meds:   Current Outpatient Medications:     cholecalciferol, vitamin D3, (VITAMIN D3) 2,000 unit Cap, Take 1 capsule by mouth once daily., Disp: , Rfl:     escitalopram oxalate (LEXAPRO) 10 MG tablet, TAKE 1 TABLET BY MOUTH ONCE DAILY, Disp: 30 tablet, Rfl: 2    multivitamin capsule, Take 1 capsule by mouth once daily., Disp: , Rfl:     rosuvastatin (CRESTOR) 20 MG tablet, TAKE 1 TABLET BY MOUTH ONCE DAILY, Disp: 90 tablet, Rfl: 1    PMHx:   Past Medical History:   Diagnosis Date    Asperger syndrome     Diabetes mellitus, type 2     "under control after weight loss"    Hyperlipidemia     Hypertension     "BEEN FINE IN RECENT YRS NEVER TOOK MEDS"    Obesity        PSHx:  Past Surgical History:   Procedure Laterality Date    ESOPHAGOGASTRODUODENOSCOPY (EGD) N/A 6/19/2018    Performed by Darell Gautam Jr., MD at [facility] ENDO       SocHx:   Social History     Socioeconomic History    Marital status: Single     Spouse name: Not on file    Number of children: Not on file    Years of education: Not on file    Highest education level: Not on file   Occupational History    Not on file   Social Needs    Financial resource strain: Not on file    Food insecurity:     Worry: Not on file     Inability: Not on file    Transportation needs:     Medical: Not on " file     Non-medical: Not on file   Tobacco Use    Smoking status: Never Smoker    Smokeless tobacco: Never Used   Substance and Sexual Activity    Alcohol use: No    Drug use: No    Sexual activity: Not on file   Lifestyle    Physical activity:     Days per week: Not on file     Minutes per session: Not on file    Stress: Not on file   Relationships    Social connections:     Talks on phone: Not on file     Gets together: Not on file     Attends Protestant service: Not on file     Active member of club or organization: Not on file     Attends meetings of clubs or organizations: Not on file     Relationship status: Not on file   Other Topics Concern    Not on file   Social History Narrative    Not on file       Review of Systems   Unable to perform ROS: Other (autistic spectrum)   Constitutional: Negative for chills, diaphoresis and fever.   HENT: Negative for ear pain and sore throat.    Respiratory: Negative for cough.    Cardiovascular: Negative for chest pain and leg swelling.   Gastrointestinal: Negative for abdominal pain, constipation and diarrhea.   Musculoskeletal: Negative for myalgias.   Skin: Negative for rash.   Neurological: Negative for headaches.   Psychiatric/Behavioral: Negative for confusion.       Objective:      Physical Exam   Constitutional: Vital signs are normal. He appears well-developed and well-nourished. No distress.   HENT:   Head: Normocephalic and atraumatic.   Right Ear: Hearing, tympanic membrane, external ear and ear canal normal. Tympanic membrane is not erythematous and not bulging.   Left Ear: Hearing, tympanic membrane, external ear and ear canal normal. Tympanic membrane is not erythematous and not bulging.   Nose: Nose normal.   Mouth/Throat: Uvula is midline, oropharynx is clear and moist and mucous membranes are normal. No oropharyngeal exudate or posterior oropharyngeal erythema.   Eyes: Pupils are equal, round, and reactive to light. Conjunctivae, EOM and lids  are normal. No scleral icterus.   No conjunctival pallor   Neck: Normal range of motion. Neck supple.   Cardiovascular: Normal rate, regular rhythm, normal heart sounds, intact distal pulses and normal pulses.   No murmur heard.  Pulmonary/Chest: Effort normal and breath sounds normal. He has no wheezes.   Abdominal: Soft. Bowel sounds are normal. He exhibits no distension. There is no tenderness. There is no rigidity, no rebound and no guarding.   He is ticklish   Musculoskeletal: Normal range of motion. He exhibits no edema.   Lymphadenopathy:     He has no cervical adenopathy.        Right: No supraclavicular adenopathy present.        Left: No supraclavicular adenopathy present.   Neurological: He is alert. He has normal reflexes. He displays normal reflexes.   Skin: Skin is warm, dry and intact. No rash noted.   Psychiatric: His affect is blunt. His speech is delayed. He is slowed and withdrawn. He is noncommunicative (but is responsive nonverbally at times).   Vitals reviewed.      Assessment:       1. Mixed hyperlipidemia    2. Autism spectrum disorder    3. Anemia, unspecified type    4. Annual physical exam        Plan:       1. Mixed hyperlipidemia  - total cholesterol is borderline-high  - Lipid panel; Future  - Comprehensive metabolic panel; Future    2. Autism spectrum disorder  - evaluated by Dr. Bojorquez with Child Development    3. Anemia, unspecified type  - Ferritin; Future  - Iron and TIBC; Future  - Reticulocytes; Future  - start ferrous sulfate 325mg daily for the next month, repeat labs in 6 weeks, add docusate to soften stool    4. Severe social anxiety disorder  - seen by Dr. Trejo for severe social anxiety, some improvement with Lexapro, return for f/u    5. Annual physical exam  - reviewed recent labs  - patient declines flu shot    RTC in 6 months or sooner if needed    Karla Arce MD

## 2019-10-23 ENCOUNTER — OFFICE VISIT (OUTPATIENT)
Dept: PSYCHIATRY | Facility: CLINIC | Age: 29
End: 2019-10-23
Payer: MEDICAID

## 2019-10-23 VITALS
WEIGHT: 251.44 LBS | BODY MASS INDEX: 30.62 KG/M2 | HEART RATE: 67 BPM | DIASTOLIC BLOOD PRESSURE: 88 MMHG | SYSTOLIC BLOOD PRESSURE: 132 MMHG | HEIGHT: 76 IN

## 2019-10-23 DIAGNOSIS — F40.10 SOCIAL ANXIETY DISORDER: Primary | ICD-10-CM

## 2019-10-23 DIAGNOSIS — F70 MILD INTELLECTUAL DISABILITY: ICD-10-CM

## 2019-10-23 DIAGNOSIS — F84.0 AUTISM SPECTRUM DISORDER: ICD-10-CM

## 2019-10-23 PROCEDURE — 99213 PR OFFICE/OUTPT VISIT, EST, LEVL III, 20-29 MIN: ICD-10-PCS | Mod: AF,HB,S$PBB, | Performed by: PSYCHIATRY & NEUROLOGY

## 2019-10-23 PROCEDURE — 99999 PR PBB SHADOW E&M-EST. PATIENT-LVL III: CPT | Mod: PBBFAC,,, | Performed by: PSYCHIATRY & NEUROLOGY

## 2019-10-23 PROCEDURE — 99213 OFFICE O/P EST LOW 20 MIN: CPT | Mod: PBBFAC | Performed by: PSYCHIATRY & NEUROLOGY

## 2019-10-23 PROCEDURE — 99213 OFFICE O/P EST LOW 20 MIN: CPT | Mod: AF,HB,S$PBB, | Performed by: PSYCHIATRY & NEUROLOGY

## 2019-10-23 PROCEDURE — 99999 PR PBB SHADOW E&M-EST. PATIENT-LVL III: ICD-10-PCS | Mod: PBBFAC,,, | Performed by: PSYCHIATRY & NEUROLOGY

## 2019-10-23 RX ORDER — ESCITALOPRAM OXALATE 20 MG/1
20 TABLET ORAL DAILY
Qty: 30 TABLET | Refills: 1 | Status: SHIPPED | OUTPATIENT
Start: 2019-10-23 | End: 2020-01-08

## 2019-10-23 NOTE — LETTER
October 29, 2019      Karla Arce MD  [address] [facility] [place] [address]           [facility] - Child Psychiatry  [address]  [place] [address]  Phone: [phone]          Patient: Seun Wynne   MR Number: [id number]   YOB: 1990   Date of Visit: 10/23/2019       Dear Dr. Karla Arce:    Thank you for referring Seun Wynne to me for evaluation. Attached you will find relevant portions of my assessment and plan of care.    If you have questions, please do not hesitate to call me. I look forward to following Seun Wynne along with you.    Sincerely,    Demian Trejo MD    Enclosure  CC:  No Recipients    If you would like to receive this communication electronically, please contact [email or web address] or [phone] to request more information on [employer] Link access.    For providers and/or their staff who would like to refer a patient to [facility], please contact us through our one-stop-shop provider referral line, [facility] , at [phone].    If you feel you have received this communication in error or would no longer like to receive these types of communications, please e-mail [email or web address]

## 2019-10-30 NOTE — PROGRESS NOTES
"Outpatient Psychiatry Initial Visit (MD/NP)    10/23/2019    Seun Wynne, a 29 y.o. male, presenting for initial evaluation visit. Met with patient and parents and sister    Reason for Encounter: Consult from [facility]. Patient complains of   Chief Complaint   Patient presents with    Autism spectrum disorder       History of Present Illness: lifelong developmental disability, now essentially housebound and has failed one trial of [place] [facility], apparently mostly due to anxiety. Here to discuss options for possible "meannigful day" engagement    Review Of Systems:     GENERAL:  No weight gain or loss  SKIN:  No rashes or lacerations  HEAD:  No headaches  EYES:  No exophthalmos, jaundice or blindness  EARS:  No dizziness, tinnitus or hearing loss  NOSE:  No changes in smell  MOUTH & THROAT:  No dyskinetic movements or obvious goiter  CHEST:  No shortness of breath, hyperventilation or cough  CARDIOVASCULAR:  No tachycardia or chest pain  ABDOMEN:  No nausea, vomiting, pain, constipation or diarrhea  URINARY:  No frequency, dysuria or sexual dysfunction  ENDOCRINE:  No polydipsia, polyuria  MUSCULOSKELETAL:  No pain or stiffness of the joints  NEUROLOGIC:  No weakness, sensory changes, seizures, confusion, memory loss, tremor or other abnormal movements    Current Evaluation:     Constitutional  Vitals:  Most recent vital signs, dated less than 90 days prior to this appointment, were reviewed.    Vitals:    10/23/19 1452   BP: 132/88   Pulse: 67   Weight: 114 kg (251 lb 7 oz)   Height: 6' 4" (1.93 m)        General:  casually dressed, disheveled, obese     Musculoskeletal  Muscle Strength/Tone:  no tremor, no tic   Gait & Station:  non-ataxic, slow, lumbering     Psychiatric  Speech:  no latency; no press, soft, non-spontaneous   Mood & Affect:  anxious  mood-congruent, restricted   Thought Process:  poverty of thought   Associations:  not verbally productive enough to assess   Thought Content:  normal, no " suicidality, no homicidality, delusions, or paranoia   Insight:  poor awareness of illness   Judgement: impaired due to intellectual impairment   Orientation:  person, day of week, year   Memory: not tested   Language: able to name, able to repeat   Attention Span & Concentration:  distracted   Fund of Knowledge:  diminished       Laboratory Data  No visits with results within 1 Month(s) from this visit.   Latest known visit with results is:   Lab Visit on 08/06/2019   Component Date Value Ref Range Status    Specimen UA 08/06/2019 Urine, Clean Catch   Final    Color, UA 08/06/2019 Yellow  Yellow, Straw, Griselda Final    Appearance, UA 08/06/2019 Clear  Clear Final    pH, UA 08/06/2019 6.0  5.0 - 8.0 Final    Specific Gravity, UA 08/06/2019 1.010  1.005 - 1.030 Final    Protein, UA 08/06/2019 Negative  Negative Final    Glucose, UA 08/06/2019 Negative  Negative Final    Ketones, UA 08/06/2019 Negative  Negative Final    Bilirubin (UA) 08/06/2019 Negative  Negative Final    Occult Blood UA 08/06/2019 Negative  Negative Final    Nitrite, UA 08/06/2019 Negative  Negative Final    Urobilinogen, UA 08/06/2019 Negative  <2.0 EU/dL Final    Leukocytes, UA 08/06/2019 Negative  Negative Final         Medications  Outpatient Encounter Medications as of 10/23/2019   Medication Sig Dispense Refill    cholecalciferol, vitamin D3, (VITAMIN D3) 2,000 unit Cap Take 1 capsule by mouth once daily.      escitalopram oxalate (LEXAPRO) 20 MG tablet Take 1 tablet (20 mg total) by mouth once daily. 30 tablet 1    multivitamin capsule Take 1 capsule by mouth once daily.      rosuvastatin (CRESTOR) 20 MG tablet TAKE 1 TABLET BY MOUTH ONCE DAILY 90 tablet 1    [DISCONTINUED] escitalopram oxalate (LEXAPRO) 10 MG tablet TAKE 1 TABLET BY MOUTH ONCE DAILY 30 tablet 2     No facility-administered encounter medications on file as of 10/23/2019.            Assessment - Diagnosis - Goals:     Impression: anxiety and information  deficit are areas where we my be able to help      ICD-10-CM ICD-9-CM   1. Autism spectrum disorder F84.0 299.00   2. Mild intellectual disability F70 317   3. Severe social anxiety    Strengths and Liabilities: Strength: Patient has positive support network., Strength: Patient is stable.    Treatment Goals:  Specify outcomes written in observable, behavioral terms:   Anxiety: eliminating avoidance (specify of leaving house) and reducing physical symptoms of anxiety    Treatment Plan/Recommendations:   · Medication Management: The risks and benefits of medication were discussed with the patient. trial lexapro as ordered  · Referral for further treatment to [facility],  [place]      Return to Clinic: 6 weeks

## 2019-11-08 ENCOUNTER — LAB VISIT (OUTPATIENT)
Dept: LAB | Facility: HOSPITAL | Age: 29
End: 2019-11-08
Attending: INTERNAL MEDICINE
Payer: MEDICAID

## 2019-11-08 DIAGNOSIS — D64.9 ANEMIA, UNSPECIFIED TYPE: ICD-10-CM

## 2019-11-08 DIAGNOSIS — E78.2 MIXED HYPERLIPIDEMIA: ICD-10-CM

## 2019-11-08 LAB
ALBUMIN SERPL BCP-MCNC: 4 G/DL (ref 3.5–5.2)
ALP SERPL-CCNC: 39 U/L (ref 55–135)
ALT SERPL W/O P-5'-P-CCNC: 48 U/L (ref 10–44)
ANION GAP SERPL CALC-SCNC: 9 MMOL/L (ref 8–16)
AST SERPL-CCNC: 35 U/L (ref 10–40)
BILIRUB SERPL-MCNC: 0.3 MG/DL (ref 0.1–1)
BUN SERPL-MCNC: 15 MG/DL (ref 6–20)
CALCIUM SERPL-MCNC: 9.4 MG/DL (ref 8.7–10.5)
CHLORIDE SERPL-SCNC: 107 MMOL/L (ref 95–110)
CHOLEST SERPL-MCNC: 245 MG/DL (ref 120–199)
CHOLEST/HDLC SERPL: 5.6 {RATIO} (ref 2–5)
CO2 SERPL-SCNC: 26 MMOL/L (ref 23–29)
CREAT SERPL-MCNC: 1 MG/DL (ref 0.5–1.4)
EST. GFR  (AFRICAN AMERICAN): >60 ML/MIN/1.73 M^2
EST. GFR  (NON AFRICAN AMERICAN): >60 ML/MIN/1.73 M^2
FERRITIN SERPL-MCNC: 333 NG/ML (ref 20–300)
GLUCOSE SERPL-MCNC: 103 MG/DL (ref 70–110)
HDLC SERPL-MCNC: 44 MG/DL (ref 40–75)
HDLC SERPL: 18 % (ref 20–50)
IRON SERPL-MCNC: 55 UG/DL (ref 45–160)
LDLC SERPL CALC-MCNC: 124.2 MG/DL (ref 63–159)
NONHDLC SERPL-MCNC: 201 MG/DL
POTASSIUM SERPL-SCNC: 4 MMOL/L (ref 3.5–5.1)
PROT SERPL-MCNC: 7.3 G/DL (ref 6–8.4)
RETICS/RBC NFR AUTO: 1.3 % (ref 0.4–2)
SATURATED IRON: 18 % (ref 20–50)
SODIUM SERPL-SCNC: 142 MMOL/L (ref 136–145)
TOTAL IRON BINDING CAPACITY: 309 UG/DL (ref 250–450)
TRANSFERRIN SERPL-MCNC: 209 MG/DL (ref 200–375)
TRIGL SERPL-MCNC: 384 MG/DL (ref 30–150)

## 2019-11-08 PROCEDURE — 80061 LIPID PANEL: CPT

## 2019-11-08 PROCEDURE — 80053 COMPREHEN METABOLIC PANEL: CPT

## 2019-11-08 PROCEDURE — 85045 AUTOMATED RETICULOCYTE COUNT: CPT

## 2019-11-08 PROCEDURE — 83540 ASSAY OF IRON: CPT

## 2019-11-08 PROCEDURE — 82728 ASSAY OF FERRITIN: CPT

## 2019-11-08 PROCEDURE — 36415 COLL VENOUS BLD VENIPUNCTURE: CPT

## 2020-01-04 ENCOUNTER — OFFICE VISIT (OUTPATIENT)
Dept: URGENT CARE | Facility: CLINIC | Age: 30
End: 2020-01-04
Payer: MEDICAID

## 2020-01-04 VITALS
OXYGEN SATURATION: 99 % | WEIGHT: 251 LBS | HEART RATE: 71 BPM | HEIGHT: 76 IN | BODY MASS INDEX: 30.56 KG/M2 | TEMPERATURE: 98 F | RESPIRATION RATE: 18 BRPM

## 2020-01-04 DIAGNOSIS — R21 RASH: Primary | ICD-10-CM

## 2020-01-04 PROCEDURE — 99213 PR OFFICE/OUTPT VISIT, EST, LEVL III, 20-29 MIN: ICD-10-PCS | Mod: S$GLB,,, | Performed by: FAMILY MEDICINE

## 2020-01-04 PROCEDURE — 99213 OFFICE O/P EST LOW 20 MIN: CPT | Mod: S$GLB,,, | Performed by: FAMILY MEDICINE

## 2020-01-04 RX ORDER — PREDNISONE 10 MG/1
TABLET ORAL
Qty: 20 TABLET | Refills: 0 | Status: SHIPPED | OUTPATIENT
Start: 2020-01-04 | End: 2020-10-14 | Stop reason: ALTCHOICE

## 2020-01-04 RX ORDER — TRIAMCINOLONE ACETONIDE 1 MG/G
CREAM TOPICAL 2 TIMES DAILY
Qty: 30 G | Refills: 1 | Status: SHIPPED | OUTPATIENT
Start: 2020-01-04 | End: 2022-04-25

## 2020-01-04 NOTE — PROGRESS NOTES
"Subjective:       Patient ID: Seun Wynne is a 29 y.o. male.    Vitals:  height is 6' 4" (1.93 m) and weight is 113.9 kg (251 lb). His temperature is 98 °F (36.7 °C). His pulse is 71. His respiration is 18 and oxygen saturation is 99%.     Chief Complaint: Rash    29-year-old autistic child male with caretaker with complaint of rash on the phase specially around the eyes as well as on the left side of the abdomen and chest area x1 week no  no unknown exposure does complain of itching and    Rash   This is a new problem. The current episode started 1 to 4 weeks ago (1 Week Ago ). The problem has been gradually worsening since onset. The affected locations include the neck, left eye, right eye, abdomen and left ear. The rash is characterized by burning and itchiness. It is unknown if there was an exposure to a precipitant. Pertinent negatives include no cough, fever or sore throat. Past treatments include nothing. The treatment provided no relief.       Constitution: Negative for chills and fever.   HENT: Negative for facial swelling and sore throat.    Neck: Negative for painful lymph nodes.   Eyes: Positive for eye itching. Negative for eyelid swelling.   Respiratory: Negative for cough.    Musculoskeletal: Negative for joint pain and joint swelling.   Skin: Positive for rash. Negative for color change, pale, wound, abrasion, laceration, lesion, skin thickening/induration, puncture wound, erythema, abscess, avulsion and hives.   Allergic/Immunologic: Positive for itching. Negative for environmental allergies, immunocompromised state and hives.   Hematologic/Lymphatic: Negative for swollen lymph nodes.       Objective:      Physical Exam   Constitutional: He is oriented to person, place, and time. He appears well-developed and well-nourished. He is cooperative.  Non-toxic appearance. He does not appear ill. No distress.   HENT:   Head: Normocephalic and atraumatic.   Right Ear: Hearing, tympanic membrane, " external ear and ear canal normal.   Left Ear: Hearing, tympanic membrane, external ear and ear canal normal.   Nose: Nose normal. No mucosal edema, rhinorrhea or nasal deformity. No epistaxis. Right sinus exhibits no maxillary sinus tenderness and no frontal sinus tenderness. Left sinus exhibits no maxillary sinus tenderness and no frontal sinus tenderness.   Mouth/Throat: Uvula is midline, oropharynx is clear and moist and mucous membranes are normal. No trismus in the jaw. Normal dentition. No uvula swelling. No posterior oropharyngeal erythema.   Eyes: Conjunctivae and lids are normal. Right eye exhibits no discharge. Left eye exhibits no discharge. No scleral icterus.   Neck: Trachea normal, normal range of motion, full passive range of motion without pain and phonation normal. Neck supple.   Cardiovascular: Normal rate, regular rhythm, normal heart sounds, intact distal pulses and normal pulses.   Pulmonary/Chest: Effort normal and breath sounds normal. No respiratory distress.   Abdominal: Soft. Normal appearance and bowel sounds are normal. He exhibits no distension, no pulsatile midline mass and no mass. There is no tenderness.   Musculoskeletal: Normal range of motion. He exhibits no edema or deformity.   Neurological: He is alert and oriented to person, place, and time. He exhibits normal muscle tone. Coordination normal.   Skin: Skin is warm, dry, intact, not diaphoretic and not pale.   Slightly hyperpigmented rash around both eyes, right > left  Similar rash on left side of chest, non papular erythema  Psychiatric: He has a normal mood and affect. His speech is normal and behavior is normal. Judgment and thought content normal. Cognition and memory are normal.   Nursing note and vitals reviewed.        Assessment:       1. Rash        Plan:         Rash    Other orders  -     predniSONE (DELTASONE) 10 MG tablet; Take 2 po bid x 3 days, one bid x 3 days, then one daily till finish  Dispense: 20 tablet;  Refill: 0  -     triamcinolone acetonide 0.1% (KENALOG) 0.1 % cream; Apply topically 2 (two) times daily. for 10 days  Dispense: 30 g; Refill: 1

## 2020-01-04 NOTE — PATIENT INSTRUCTIONS
"  Contact Dermatitis  Contact dermatitis is a skin rash caused by something that touches the skin and makes it irritated and inflamed. Your skin may be red, swollen, dry, and may be cracked. Blisters may form and ooze. The rash will itch.  Contact dermatitis can form on the face and neck, backs of hands, forearms, genitals, and lower legs.  People can get contact dermatitis from lots of sources. These include:  · Plants such as poison ivy, oak, or sumac  · Chemicals in hair dyes and rinses, soaps, solvents, waxes, fingernail polish, and deodorants   · Jewelry or watchbands made of nickel  Contact dermatitis is not passed from person to person.  Talk with your healthcare provider about what may have caused the rash. A type of allergy testing called "patch testing" may be used to discover what you are allergic to. You will need to avoid the source of your rash in the future to prevent it from coming back.  Treatment is done to relieve itching and prevent the rash from coming back. The rash should go away in a few days to a few weeks.  Home care  Your healthcare provider may prescribe medicine to relieve swelling and itching. Follow all instructions when using these medicines.  General care:  · Avoid anything that heats up your skin, such as hot showers or baths, or direct sunlight. This can make itching worse.  · Apply cold compresses to soothe your sores to help relieve your symptoms. Do this for 30 minutes 3 to 4 times a day. You can make a cold compress by soaking a cloth in cold water. Squeeze out excess water. You can add colloidal oatmeal to the water to help reduce itching. For severe itching in a small area, apply an ice pack wrapped in a thin towel. Do this for 20 minutes 3 to 4 times a day.  · You can also try wet dressings. One way to do this is to wear a wet piece of clothing under a dry one. Wear a damp shirt under a dry shirt if your upper body is affected. This can relieve itching and prevent you from " scratching the affected area.  · You can also help relieve large areas of itching by taking a lukewarm bath with colloidal oatmeal added to the water.  · Use hydrocortisone cream for redness and irritation, unless another medicine was prescribed. You can also use benzocaine anesthetic cream or spray. Calamine lotion can also relieve mild symptoms.  · Use oral diphenhydramine to help reduce itching. You can buy this antihistamine at drug and grocery stores. It can make you sleepy, so use lower doses during the daytime. Or you can use loratadine. This is an antihistamine that will not make you sleepy. Do not use diphenhydramine if you have glaucoma or have trouble urinating due to an enlarged prostate.  · If a plant causes your rash, make sure to wash your skin and the clothes you were wearing when you came into contact with the plant. This is to wash away the plant oils that gave you the rash and prevent more or worse symptoms.  · Stay away from the substance or object that causes your symptoms. If you cant avoid it, wear gloves or some other type of protection.  Follow-up care  Follow up with your healthcare provider, or as advised.  When to seek medical advice  Call your healthcare provider right away if any of these occur:  · Spreading of the rash to other parts of your body  · Severe swelling of your face, eyelids, mouth, throat or tongue  · Trouble urinating due to swelling in the genital area  · Fever of 100.4°F (38°C) or higher  · Redness or swelling that gets worse  · Pain that gets worse  · Foul-smelling fluid leaking from the skin  · Yellow-brown crusts on the open blisters  Date Last Reviewed: 9/1/2016  © 7132-9440 [employer]. [address] [place] [address], [place] [address]. All rights reserved. This information is not intended as a substitute for professional medical care. Always follow your healthcare professional's instructions.

## 2020-01-08 ENCOUNTER — PATIENT MESSAGE (OUTPATIENT)
Dept: PSYCHIATRY | Facility: CLINIC | Age: 30
End: 2020-01-08

## 2020-01-08 DIAGNOSIS — F84.0 AUTISM SPECTRUM DISORDER: ICD-10-CM

## 2020-01-08 RX ORDER — ESCITALOPRAM OXALATE 20 MG/1
20 TABLET ORAL DAILY
Qty: 30 TABLET | Refills: 5 | Status: SHIPPED | OUTPATIENT
Start: 2020-01-08 | End: 2020-07-10

## 2020-02-14 DIAGNOSIS — R74.01 ELEVATED ALT MEASUREMENT: Primary | ICD-10-CM

## 2020-02-14 DIAGNOSIS — E78.2 MIXED HYPERLIPIDEMIA: ICD-10-CM

## 2020-02-15 RX ORDER — ATORVASTATIN CALCIUM 40 MG/1
TABLET, FILM COATED ORAL
Refills: 0 | OUTPATIENT
Start: 2020-02-15

## 2020-02-15 RX ORDER — ROSUVASTATIN CALCIUM 20 MG/1
20 TABLET, COATED ORAL DAILY
Qty: 90 TABLET | Refills: 0 | Status: SHIPPED | OUTPATIENT
Start: 2020-02-15 | End: 2020-12-04

## 2020-02-15 NOTE — TELEPHONE ENCOUNTER
Received refill request for Lipitor but he was switched to Crestor in September.     Refilled Crestor.    Last lipid panel was elevated. Please arrange fasting labs.

## 2020-02-27 ENCOUNTER — TELEPHONE (OUTPATIENT)
Dept: INTERNAL MEDICINE | Facility: CLINIC | Age: 30
End: 2020-02-27

## 2020-03-05 ENCOUNTER — APPOINTMENT (OUTPATIENT)
Dept: LAB | Facility: HOSPITAL | Age: 30
End: 2020-03-05
Attending: INTERNAL MEDICINE
Payer: MEDICAID

## 2020-04-29 ENCOUNTER — TELEPHONE (OUTPATIENT)
Dept: INTERNAL MEDICINE | Facility: CLINIC | Age: 30
End: 2020-04-29

## 2020-04-29 DIAGNOSIS — R74.8 ABNORMAL AST AND ALT: Primary | ICD-10-CM

## 2020-04-29 NOTE — TELEPHONE ENCOUNTER
Needs hepatic panel to be scheduled soon.    Sent a message to his mother about the following:    Last set of labs show that liver numbers increased a little. Testing for hepatitis is negative.     The elevation can be cause by medications like tylenol or sometimes statin medications like Crestor. But I do not think he was using Crestor because the last total cholesterol level and triglycerides increased a lot. Does he use Crestor now?     Repeat hepatic panel now and then will stop Crestor.     Repeat labs in 1 month. If remains elevated, will check abdominal US and then refer to hepatology.

## 2020-04-30 NOTE — TELEPHONE ENCOUNTER
Portal message not read.    Spoke to pt's mother.  Read your message to her.  She verbalized understanding.    Pt is taking Crestor now.  She said he takes it every day.  She doesn't remember when he started taking it.    She said he takes a lot of tylenol.  She doesn't know how much, just that the level of pills in the bottle goes down quickly.    Scheduled lab for tomorrow.  She will have him stop Crestor after labs tomorrow.

## 2020-05-01 ENCOUNTER — LAB VISIT (OUTPATIENT)
Dept: LAB | Facility: HOSPITAL | Age: 30
End: 2020-05-01
Attending: INTERNAL MEDICINE
Payer: MEDICAID

## 2020-05-01 DIAGNOSIS — R74.8 ABNORMAL AST AND ALT: ICD-10-CM

## 2020-05-01 LAB
ALBUMIN SERPL BCP-MCNC: 4.8 G/DL (ref 3.5–5.2)
ALP SERPL-CCNC: 40 U/L (ref 55–135)
ALT SERPL W/O P-5'-P-CCNC: 31 U/L (ref 10–44)
APAP SERPL-MCNC: 6 UG/ML (ref 10–20)
AST SERPL-CCNC: 27 U/L (ref 10–40)
BILIRUB DIRECT SERPL-MCNC: 0.1 MG/DL (ref 0.1–0.3)
BILIRUB SERPL-MCNC: 0.4 MG/DL (ref 0.1–1)
PROT SERPL-MCNC: 8.3 G/DL (ref 6–8.4)

## 2020-05-01 PROCEDURE — 36415 COLL VENOUS BLD VENIPUNCTURE: CPT

## 2020-05-01 PROCEDURE — 80076 HEPATIC FUNCTION PANEL: CPT

## 2020-05-01 PROCEDURE — 80329 ANALGESICS NON-OPIOID 1 OR 2: CPT

## 2020-05-02 ENCOUNTER — TELEPHONE (OUTPATIENT)
Dept: INTERNAL MEDICINE | Facility: CLINIC | Age: 30
End: 2020-05-02

## 2020-05-02 NOTE — TELEPHONE ENCOUNTER
----- Message from Karla Arce MD sent at 5/1/2020 12:58 PM CDT -----  Message sent via patient portal.    - may resume Crestor

## 2020-05-02 NOTE — TELEPHONE ENCOUNTER
Spoke with pt's mother re: message was sent to pt via portal.   Please review portal, but pt can restart the crestor.  Mother verbalized understanding

## 2020-10-05 ENCOUNTER — PATIENT MESSAGE (OUTPATIENT)
Dept: ADMINISTRATIVE | Facility: HOSPITAL | Age: 30
End: 2020-10-05

## 2020-10-14 ENCOUNTER — OFFICE VISIT (OUTPATIENT)
Dept: PSYCHIATRY | Facility: CLINIC | Age: 30
End: 2020-10-14
Payer: MEDICAID

## 2020-10-14 DIAGNOSIS — F84.0 AUTISM SPECTRUM DISORDER: ICD-10-CM

## 2020-10-14 DIAGNOSIS — F40.10 SOCIAL ANXIETY DISORDER: Primary | ICD-10-CM

## 2020-10-14 DIAGNOSIS — F70 MILD INTELLECTUAL DISABILITY: ICD-10-CM

## 2020-10-14 PROCEDURE — 90833 PSYTX W PT W E/M 30 MIN: CPT | Mod: 95,AF,HB, | Performed by: PSYCHIATRY & NEUROLOGY

## 2020-10-14 PROCEDURE — 99213 PR OFFICE/OUTPT VISIT, EST, LEVL III, 20-29 MIN: ICD-10-PCS | Mod: 95,AF,HB, | Performed by: PSYCHIATRY & NEUROLOGY

## 2020-10-14 PROCEDURE — 90833 PR PSYCHOTHERAPY W/PATIENT W/E&M, 30 MIN (ADD ON): ICD-10-PCS | Mod: 95,AF,HB, | Performed by: PSYCHIATRY & NEUROLOGY

## 2020-10-14 PROCEDURE — 99213 OFFICE O/P EST LOW 20 MIN: CPT | Mod: 95,AF,HB, | Performed by: PSYCHIATRY & NEUROLOGY

## 2020-10-14 RX ORDER — ESCITALOPRAM OXALATE 20 MG/1
20 TABLET ORAL DAILY
Qty: 30 TABLET | Refills: 11 | Status: SHIPPED | OUTPATIENT
Start: 2020-10-14 | End: 2021-11-04

## 2020-10-14 NOTE — PROGRESS NOTES
Outpatient Psychiatry Follow-Up Visit (MD/NP)    10/14/2020    Clinical Status of Patient:  Outpatient (Ambulatory)  The patient location is: at home in [facility]  The chief complaint leading to consultation is: below  Visit type: simultaneous audio-visual  Total time spent with patient: 29 minutes  Each patient to whom he or she provides medical services by telemedicine is:  (1) informed of the relationship between the physician and patient and the respective role of any other health care provider with respect to management of the patient; and (2) notified that he or she may decline to receive medical services by telemedicine and may withdraw from such care at any time.      Chief Complaint:  Seun Wynne is a 30 y.o. male who presents today for follow-up of anxiety and ASD with language and intellectual impairment.  Met with patient, mother and father.      Interval History and Content of Current Session:  Interim Events/Subjective Report/Content of Current Session: doing the same. Anxiety still quite well controlled. Efforts at long term housing plans on hold since beginning of pandemic. Some further options discussed. Day programs discussed.    Psychotherapy:  · Target symptoms: anxiety   · Why chosen therapy is appropriate versus another modality: relevant to diagnosis  · Outcome monitoring methods: self-report, observation, feedback from family  · Therapeutic intervention type: behavior modifying psychotherapy  · Topics discussed/themes: parenting issues, building skills sets for symptom management, financial stressors  · The patient's response to the intervention is motivated. The patient's progress toward treatment goals is fair.   · Duration of intervention: 19 minutes.    Review of Systems   · PSYCHIATRIC: Pertinant items are noted in the narrative.  · CONSTITUTIONAL: No weight gain or loss.   · MUSCULOSKELETAL: No pain or stiffness of the joints.  · NEUROLOGIC: No weakness, sensory changes,  "seizures, confusion, memory loss, tremor or other abnormal movements.  · ENDOCRINE: No polydipsia or polyuria.  · INTEGUMENTARY: No rashes or lacerations.  · EYES: No exophthalmos, jaundice or blindness.  · ENT: No dizziness, tinnitus or hearing loss.  · RESPIRATORY: No shortness of breath.  · CARDIOVASCULAR: No tachycardia or chest pain.  · GASTROINTESTINAL: No nausea, vomiting, pain, constipation or diarrhea.  · GENITOURINARY: No frequency, dysuria or sexual dysfunction.  · HEMATOLOGIC/LYMPHATIC: No excessive bleeding, prolonged or excessive bleeding after dental extraction/injury.  · ALLERGIC/IMMUNOLOGIC: No allergic response to materials, foods or animals at this time.    Past Medical, Family and Social History: The patient's past medical, family and social history have been reviewed and updated as appropriate within the electronic medical record - see encounter notes.  Past Medical History:   Diagnosis Date    Asperger syndrome     Diabetes mellitus, type 2     "under control after weight loss"    Hyperlipidemia     Hypertension     "BEEN FINE IN RECENT YRS NEVER TOOK MEDS"    Obesity      Past Surgical History:   Procedure Laterality Date    ESOPHAGOGASTRODUODENOSCOPY N/A 6/19/2018    Procedure: ESOPHAGOGASTRODUODENOSCOPY (EGD);  Surgeon: Darell Gautam Jr., MD;  Location: [facility];  Service: Endoscopy;  Laterality: N/A;     Current Outpatient Medications on File Prior to Visit   Medication Sig Dispense Refill    cholecalciferol, vitamin D3, (VITAMIN D3) 2,000 unit Cap Take 1 capsule by mouth once daily.      multivitamin capsule Take 1 capsule by mouth once daily.      rosuvastatin (CRESTOR) 20 MG tablet Take 1 tablet (20 mg total) by mouth once daily. 90 tablet 0    triamcinolone acetonide 0.1% (KENALOG) 0.1 % cream Apply topically 2 (two) times daily. for 10 days 30 g 1     No current facility-administered medications on file prior to visit.        Compliance: yes    Side effects: " None    Risk Parameters:  Patient reports no suicidal ideation  Patient reports no homicidal ideation  Patient reports no self-injurious behavior  Patient reports no violent behavior    Exam (detailed: at least 9 elements; comprehensive: all 15 elements)   Constitutional  Vitals:  Most recent vital signs, dated greater than 90 days prior to this appointment, were reviewed.   There were no vitals filed for this visit.     General:  age appropriate, casually dressed, neatly groomed, overweight     Musculoskeletal  Muscle Strength/Tone:  no tremor, no tic   Gait & Station:  non-ataxic     Psychiatric  Speech:  aphonic   Mood & Affect:  happy  congruent and appropriate   Thought Process:  concrete   Associations:  unable to assess   Thought Content:  no suicidality, no homicidality, delusions, or paranoia   Insight:  limited awareness of illness   Judgement: impaired due to intellectual limitations   Orientation:  person, place, situation, day of week   Memory: not tested   Language: very limited expressive language   Attention Span & Concentration:  able to focus   Fund of Knowledge:  intact and appropriate to age and level of education     Assessment and Diagnosis   Status/Progress: Based on the examination today, the patient's problem(s) is/are adequately controlled.  New problems have not been presented today.   Co-morbidities are complicating management of the primary condition.  There are no active rule-out diagnoses for this patient at this time.     General Impression: doing okay        ICD-10-CM ICD-9-CM   1. Social anxiety disorder  F40.10 300.23   2. Autism spectrum disorder  F84.0 299.00   3. Mild intellectual disability  F70 317       Intervention/Counseling/Treatment Plan   · Medication Management: Continue current medications. The risks and benefits of medication were discussed with the patient.  · Outside records/collateral information from additional sources: reviewed collateral from parents and  [facility]  · Counseling provided with patient and family as follows: importance of compliance with chosen treatment options was emphasized, risks and benefits of treatment options, including medications, were discussed with the patient  · Care Coordination: During the visit, care coordination was conducted with  family.      Return to Clinic: 6 months

## 2020-10-27 ENCOUNTER — TELEPHONE (OUTPATIENT)
Dept: INTERNAL MEDICINE | Facility: CLINIC | Age: 30
End: 2020-10-27

## 2020-10-27 DIAGNOSIS — Z00.00 ANNUAL PHYSICAL EXAM: Primary | ICD-10-CM

## 2020-10-27 NOTE — TELEPHONE ENCOUNTER
----- Message from Camilla Tate sent at 10/26/2020 10:48 AM CDT -----  Regarding: Annual Visit  Contact: [phone] @ [facility]  ESTABLISHED patient with Medicaid insurance is requesting an appointment and first available is too far out.  Patient is established with which PCP: Dr Arce  Reason for the visit: Annual Visit

## 2020-11-04 ENCOUNTER — LAB VISIT (OUTPATIENT)
Dept: LAB | Facility: HOSPITAL | Age: 30
End: 2020-11-04
Attending: INTERNAL MEDICINE
Payer: MEDICAID

## 2020-11-04 DIAGNOSIS — Z00.00 ANNUAL PHYSICAL EXAM: ICD-10-CM

## 2020-11-04 LAB
ALBUMIN SERPL BCP-MCNC: 4.3 G/DL (ref 3.5–5.2)
ALP SERPL-CCNC: 50 U/L (ref 55–135)
ALT SERPL W/O P-5'-P-CCNC: 41 U/L (ref 10–44)
ANION GAP SERPL CALC-SCNC: 8 MMOL/L (ref 8–16)
AST SERPL-CCNC: 35 U/L (ref 10–40)
BASOPHILS # BLD AUTO: 0.03 K/UL (ref 0–0.2)
BASOPHILS NFR BLD: 0.6 % (ref 0–1.9)
BILIRUB SERPL-MCNC: 0.3 MG/DL (ref 0.1–1)
BUN SERPL-MCNC: 15 MG/DL (ref 6–20)
CALCIUM SERPL-MCNC: 10.3 MG/DL (ref 8.7–10.5)
CHLORIDE SERPL-SCNC: 105 MMOL/L (ref 95–110)
CHOLEST SERPL-MCNC: 226 MG/DL (ref 120–199)
CHOLEST/HDLC SERPL: 4.6 {RATIO} (ref 2–5)
CO2 SERPL-SCNC: 29 MMOL/L (ref 23–29)
CREAT SERPL-MCNC: 1 MG/DL (ref 0.5–1.4)
DIFFERENTIAL METHOD: ABNORMAL
EOSINOPHIL # BLD AUTO: 0.2 K/UL (ref 0–0.5)
EOSINOPHIL NFR BLD: 3.8 % (ref 0–8)
ERYTHROCYTE [DISTWIDTH] IN BLOOD BY AUTOMATED COUNT: 18.1 % (ref 11.5–14.5)
EST. GFR  (AFRICAN AMERICAN): >60 ML/MIN/1.73 M^2
EST. GFR  (NON AFRICAN AMERICAN): >60 ML/MIN/1.73 M^2
GLUCOSE SERPL-MCNC: 109 MG/DL (ref 70–110)
HCT VFR BLD AUTO: 36.9 % (ref 40–54)
HDLC SERPL-MCNC: 49 MG/DL (ref 40–75)
HDLC SERPL: 21.7 % (ref 20–50)
HGB BLD-MCNC: 11 G/DL (ref 14–18)
IMM GRANULOCYTES # BLD AUTO: 0.04 K/UL (ref 0–0.04)
IMM GRANULOCYTES NFR BLD AUTO: 0.8 % (ref 0–0.5)
LDLC SERPL CALC-MCNC: 141.6 MG/DL (ref 63–159)
LYMPHOCYTES # BLD AUTO: 1.7 K/UL (ref 1–4.8)
LYMPHOCYTES NFR BLD: 33.3 % (ref 18–48)
MCH RBC QN AUTO: 21.9 PG (ref 27–31)
MCHC RBC AUTO-ENTMCNC: 29.8 G/DL (ref 32–36)
MCV RBC AUTO: 73 FL (ref 82–98)
MONOCYTES # BLD AUTO: 0.4 K/UL (ref 0.3–1)
MONOCYTES NFR BLD: 6.9 % (ref 4–15)
NEUTROPHILS # BLD AUTO: 2.9 K/UL (ref 1.8–7.7)
NEUTROPHILS NFR BLD: 54.6 % (ref 38–73)
NONHDLC SERPL-MCNC: 177 MG/DL
NRBC BLD-RTO: 0 /100 WBC
PLATELET # BLD AUTO: 264 K/UL (ref 150–350)
PMV BLD AUTO: 10 FL (ref 9.2–12.9)
POTASSIUM SERPL-SCNC: 4.2 MMOL/L (ref 3.5–5.1)
PROT SERPL-MCNC: 7.9 G/DL (ref 6–8.4)
RBC # BLD AUTO: 5.03 M/UL (ref 4.6–6.2)
SODIUM SERPL-SCNC: 142 MMOL/L (ref 136–145)
TRIGL SERPL-MCNC: 177 MG/DL (ref 30–150)
TSH SERPL DL<=0.005 MIU/L-ACNC: 1.25 UIU/ML (ref 0.4–4)
WBC # BLD AUTO: 5.23 K/UL (ref 3.9–12.7)

## 2020-11-04 PROCEDURE — 85025 COMPLETE CBC W/AUTO DIFF WBC: CPT

## 2020-11-04 PROCEDURE — 80061 LIPID PANEL: CPT

## 2020-11-04 PROCEDURE — 80053 COMPREHEN METABOLIC PANEL: CPT

## 2020-11-04 PROCEDURE — 84443 ASSAY THYROID STIM HORMONE: CPT

## 2020-11-04 PROCEDURE — 36415 COLL VENOUS BLD VENIPUNCTURE: CPT

## 2020-11-11 ENCOUNTER — OFFICE VISIT (OUTPATIENT)
Dept: INTERNAL MEDICINE | Facility: CLINIC | Age: 30
End: 2020-11-11
Payer: MEDICAID

## 2020-11-11 VITALS
DIASTOLIC BLOOD PRESSURE: 86 MMHG | BODY MASS INDEX: 34.77 KG/M2 | HEIGHT: 76 IN | RESPIRATION RATE: 18 BRPM | OXYGEN SATURATION: 97 % | HEART RATE: 64 BPM | SYSTOLIC BLOOD PRESSURE: 124 MMHG | TEMPERATURE: 97 F | WEIGHT: 285.5 LBS

## 2020-11-11 DIAGNOSIS — F84.0 AUTISM SPECTRUM DISORDER: ICD-10-CM

## 2020-11-11 DIAGNOSIS — H10.12 ALLERGIC CONJUNCTIVITIS OF LEFT EYE: ICD-10-CM

## 2020-11-11 DIAGNOSIS — D64.9 ANEMIA, UNSPECIFIED TYPE: ICD-10-CM

## 2020-11-11 DIAGNOSIS — R51.9 FREQUENT HEADACHES: ICD-10-CM

## 2020-11-11 DIAGNOSIS — F84.9 PERVASIVE DEVELOPMENTAL DISORDER: ICD-10-CM

## 2020-11-11 DIAGNOSIS — E78.2 MIXED HYPERLIPIDEMIA: ICD-10-CM

## 2020-11-11 DIAGNOSIS — Z00.00 ANNUAL PHYSICAL EXAM: Primary | ICD-10-CM

## 2020-11-11 PROCEDURE — 99214 OFFICE O/P EST MOD 30 MIN: CPT | Mod: S$PBB,,, | Performed by: INTERNAL MEDICINE

## 2020-11-11 PROCEDURE — 90471 IMMUNIZATION ADMIN: CPT | Mod: PBBFAC,PO

## 2020-11-11 PROCEDURE — 99214 OFFICE O/P EST MOD 30 MIN: CPT | Mod: PBBFAC,PO | Performed by: INTERNAL MEDICINE

## 2020-11-11 PROCEDURE — 99999 PR PBB SHADOW E&M-EST. PATIENT-LVL IV: CPT | Mod: PBBFAC,,, | Performed by: INTERNAL MEDICINE

## 2020-11-11 PROCEDURE — 99999 PR PBB SHADOW E&M-EST. PATIENT-LVL IV: ICD-10-PCS | Mod: PBBFAC,,, | Performed by: INTERNAL MEDICINE

## 2020-11-11 PROCEDURE — 99214 PR OFFICE/OUTPT VISIT, EST, LEVL IV, 30-39 MIN: ICD-10-PCS | Mod: S$PBB,,, | Performed by: INTERNAL MEDICINE

## 2020-11-11 NOTE — PROGRESS NOTES
"Subjective:      Seun Wynne is a 30 y.o. male who presents for annual exam.  Patient is here with his mother.     Left eye redness- Started today and patient reports eye discomfort and itching. No recent eye injuries and no significant eye drainage is noted. Vision is normal. \    HLD- Compliant with Crestor. Total cholesterol and triglyceride levels have improved. No muscle pain or cramping.     Anxiety- sees Psychiatry regularly and patient is compliant with Lexapro.     Family History:  family history includes Cancer in his maternal aunt; Diabetes in his maternal grandfather; Hypertension in his father, maternal grandfather, mother, and paternal grandfather.    Health Maintenance:  Health Maintenance       Date Due Completion Date    HIV Screening 09/04/2005 ---    Influenza Vaccine (1) 08/01/2020 ---    TETANUS VACCINE 10/28/2020 10/28/2010        Eye exam: 2019  Dental Exam: every 6 months  Influenza: due  Tetanus: 2010, due now    Body mass index is 34.75 kg/m².      Meds:   Current Outpatient Medications:     cholecalciferol, vitamin D3, (VITAMIN D3) 2,000 unit Cap, Take 1 capsule by mouth once daily., Disp: , Rfl:     escitalopram oxalate (LEXAPRO) 20 MG tablet, Take 1 tablet (20 mg total) by mouth once daily., Disp: 30 tablet, Rfl: 11    multivitamin capsule, Take 1 capsule by mouth once daily., Disp: , Rfl:     rosuvastatin (CRESTOR) 20 MG tablet, Take 1 tablet (20 mg total) by mouth once daily., Disp: 90 tablet, Rfl: 0    naphazoline-pheniramine 0.025-0.3% (NAPHCON-A) 0.025-0.3 % ophthalmic solution, Place 1 drop into the left eye 4 (four) times daily. for 7 days, Disp: , Rfl: 0    triamcinolone acetonide 0.1% (KENALOG) 0.1 % cream, Apply topically 2 (two) times daily. for 10 days, Disp: 30 g, Rfl: 1    PMHx:   Past Medical History:   Diagnosis Date    Asperger syndrome     Diabetes mellitus, type 2     "under control after weight loss"    Hyperlipidemia     Hypertension     "BEEN FINE " "IN RECENT YRS NEVER TOOK MEDS"    Obesity        PSHx:  Past Surgical History:   Procedure Laterality Date    ESOPHAGOGASTRODUODENOSCOPY N/A 6/19/2018    Procedure: ESOPHAGOGASTRODUODENOSCOPY (EGD);  Surgeon: Darell Gautam Jr., MD;  Location: [facility];  Service: Endoscopy;  Laterality: N/A;       SocHx:   Social History     Socioeconomic History    Marital status: Single     Spouse name: Not on file    Number of children: Not on file    Years of education: Not on file    Highest education level: Not on file   Occupational History    Not on file   Social Needs    Financial resource strain: Not on file    Food insecurity     Worry: Not on file     Inability: Not on file    Transportation needs     Medical: Not on file     Non-medical: Not on file   Tobacco Use    Smoking status: Never Smoker    Smokeless tobacco: Never Used   Substance and Sexual Activity    Alcohol use: No    Drug use: No    Sexual activity: Not on file   Lifestyle    Physical activity     Days per week: Not on file     Minutes per session: Not on file    Stress: Not on file   Relationships    Social connections     Talks on phone: Not on file     Gets together: Not on file     Attends Mosque service: Not on file     Active member of club or organization: Not on file     Attends meetings of clubs or organizations: Not on file     Relationship status: Not on file   Other Topics Concern    Not on file   Social History Narrative    Not on file       Review of Systems   Unable to perform ROS: Other (limited due to developmental disorder)   Constitutional: Negative for diaphoresis and fever.   HENT: Positive for sinus pressure (forehead and behind eyes). Negative for congestion, rhinorrhea and sore throat.    Eyes: Positive for redness and itching. Negative for discharge and visual disturbance.   Respiratory: Negative for cough and shortness of breath.    Cardiovascular: Negative for chest pain and palpitations. "   Gastrointestinal: Negative for abdominal pain and vomiting.   Genitourinary: Negative for discharge and hematuria.   Musculoskeletal: Negative for arthralgias and myalgias.   Neurological: Positive for headaches (takes tylenol regularly for headaches). Negative for dizziness.   Hematological: Negative for adenopathy.       Objective:      Physical Exam  Vitals signs reviewed.   Constitutional:       General: He is not in acute distress.     Appearance: He is well-developed.   HENT:      Head: Normocephalic and atraumatic.      Right Ear: Hearing, tympanic membrane, ear canal and external ear normal. Tympanic membrane is not erythematous or bulging.      Left Ear: Hearing, tympanic membrane, ear canal and external ear normal. Tympanic membrane is not erythematous or bulging.      Nose: Nose normal.      Right Sinus: No maxillary sinus tenderness or frontal sinus tenderness.      Left Sinus: No maxillary sinus tenderness or frontal sinus tenderness.      Mouth/Throat:      Mouth: Mucous membranes are moist.      Pharynx: Oropharynx is clear. Uvula midline. No pharyngeal swelling, oropharyngeal exudate or posterior oropharyngeal erythema.   Eyes:      General: Lids are normal. No scleral icterus.        Right eye: No discharge.         Left eye: No discharge.      Conjunctiva/sclera:      Right eye: Right conjunctiva is not injected.      Left eye: Left conjunctiva is injected.      Pupils: Pupils are equal, round, and reactive to light.   Neck:      Musculoskeletal: Normal range of motion and neck supple. No spinous process tenderness or muscular tenderness.      Thyroid: No thyroid mass or thyromegaly.   Cardiovascular:      Rate and Rhythm: Normal rate and regular rhythm.      Pulses: Normal pulses.      Heart sounds: Normal heart sounds. No murmur.   Pulmonary:      Effort: Pulmonary effort is normal.      Breath sounds: Normal breath sounds. No wheezing.   Abdominal:      General: Bowel sounds are normal. There  is no distension.      Palpations: Abdomen is soft. Abdomen is not rigid.      Tenderness: There is no abdominal tenderness. There is no guarding or rebound.   Musculoskeletal: Normal range of motion.   Lymphadenopathy:      Cervical: No cervical adenopathy.      Upper Body:      Right upper body: No supraclavicular adenopathy.      Left upper body: No supraclavicular adenopathy.   Skin:     General: Skin is warm and dry.      Findings: No rash.   Neurological:      Mental Status: He is alert and oriented to person, place, and time.      Coordination: Coordination normal.      Gait: Gait normal.      Deep Tendon Reflexes: Reflexes are normal and symmetric. Reflexes normal.   Psychiatric:         Speech: He is noncommunicative. Speech is delayed.         Assessment:       1. Annual physical exam    2. Mixed hyperlipidemia    3. Autism spectrum disorder    4. Pervasive developmental disorder    5. Frequent headaches    6. Allergic conjunctivitis of left eye    7. Anemia, unspecified type        Plan:         1. Annual physical exam  - reviewed recent labs with patient and his mother  - Influenza - Quadrivalent (PF)    2. Mixed hyperlipidemia  - improving, continue Crestor    3. Autism spectrum disorder  - stable, continue to monitor    4. Pervasive developmental disorder  - stable, continue to monitor, sees child psychiatry    5. Frequent headaches  - Ambulatory referral/consult to Neurology; Future    6. Allergic conjunctivitis of left eye  - naphazoline-pheniramine 0.025-0.3% (NAPHCON-A) 0.025-0.3 % ophthalmic solution; Place 1 drop into the left eye 4 (four) times daily. for 7 days; Refill: 0    7. Anemia, unspecified type  - continue iron supplement, repeat labs in 3 months  - CBC Auto Differential; Future  - Iron and TIBC; Future  - Ferritin; Future    RTC in 6 months for follow-up sooner if needed    Karla Arce MD  Internal Medicine, [facility]

## 2021-02-12 ENCOUNTER — LAB VISIT (OUTPATIENT)
Dept: LAB | Facility: HOSPITAL | Age: 31
End: 2021-02-12
Attending: INTERNAL MEDICINE
Payer: MEDICAID

## 2021-02-12 DIAGNOSIS — D64.9 ANEMIA, UNSPECIFIED TYPE: ICD-10-CM

## 2021-02-12 LAB
BASOPHILS # BLD AUTO: 0.04 K/UL (ref 0–0.2)
BASOPHILS NFR BLD: 0.7 % (ref 0–1.9)
DIFFERENTIAL METHOD: ABNORMAL
EOSINOPHIL # BLD AUTO: 0.3 K/UL (ref 0–0.5)
EOSINOPHIL NFR BLD: 4.6 % (ref 0–8)
ERYTHROCYTE [DISTWIDTH] IN BLOOD BY AUTOMATED COUNT: 18.6 % (ref 11.5–14.5)
FERRITIN SERPL-MCNC: 359 NG/ML (ref 20–300)
HCT VFR BLD AUTO: 38 % (ref 40–54)
HGB BLD-MCNC: 11.7 G/DL (ref 14–18)
IMM GRANULOCYTES # BLD AUTO: 0.03 K/UL (ref 0–0.04)
IMM GRANULOCYTES NFR BLD AUTO: 0.5 % (ref 0–0.5)
IRON SERPL-MCNC: 60 UG/DL (ref 45–160)
LYMPHOCYTES # BLD AUTO: 2.2 K/UL (ref 1–4.8)
LYMPHOCYTES NFR BLD: 40.5 % (ref 18–48)
MCH RBC QN AUTO: 22.2 PG (ref 27–31)
MCHC RBC AUTO-ENTMCNC: 30.8 G/DL (ref 32–36)
MCV RBC AUTO: 72 FL (ref 82–98)
MONOCYTES # BLD AUTO: 0.3 K/UL (ref 0.3–1)
MONOCYTES NFR BLD: 6 % (ref 4–15)
NEUTROPHILS # BLD AUTO: 2.6 K/UL (ref 1.8–7.7)
NEUTROPHILS NFR BLD: 47.7 % (ref 38–73)
NRBC BLD-RTO: 0 /100 WBC
PLATELET # BLD AUTO: 281 K/UL (ref 150–350)
PMV BLD AUTO: 9.9 FL (ref 9.2–12.9)
RBC # BLD AUTO: 5.28 M/UL (ref 4.6–6.2)
SATURATED IRON: 19 % (ref 20–50)
TOTAL IRON BINDING CAPACITY: 312 UG/DL (ref 250–450)
TRANSFERRIN SERPL-MCNC: 211 MG/DL (ref 200–375)
WBC # BLD AUTO: 5.48 K/UL (ref 3.9–12.7)

## 2021-02-12 PROCEDURE — 83540 ASSAY OF IRON: CPT

## 2021-02-12 PROCEDURE — 36415 COLL VENOUS BLD VENIPUNCTURE: CPT

## 2021-02-12 PROCEDURE — 85025 COMPLETE CBC W/AUTO DIFF WBC: CPT

## 2021-02-12 PROCEDURE — 82728 ASSAY OF FERRITIN: CPT

## 2021-03-30 ENCOUNTER — PATIENT OUTREACH (OUTPATIENT)
Dept: ADMINISTRATIVE | Facility: OTHER | Age: 31
End: 2021-03-30

## 2021-04-23 ENCOUNTER — PATIENT MESSAGE (OUTPATIENT)
Dept: INTERNAL MEDICINE | Facility: CLINIC | Age: 31
End: 2021-04-23

## 2021-04-23 DIAGNOSIS — R51.9 FREQUENT HEADACHES: Primary | ICD-10-CM

## 2021-06-29 ENCOUNTER — TELEPHONE (OUTPATIENT)
Dept: NEUROLOGY | Facility: CLINIC | Age: 31
End: 2021-06-29

## 2021-07-21 ENCOUNTER — PATIENT MESSAGE (OUTPATIENT)
Dept: INTERNAL MEDICINE | Facility: CLINIC | Age: 31
End: 2021-07-21

## 2021-07-21 ENCOUNTER — TELEPHONE (OUTPATIENT)
Dept: INTERNAL MEDICINE | Facility: CLINIC | Age: 31
End: 2021-07-21

## 2021-07-23 ENCOUNTER — TELEPHONE (OUTPATIENT)
Dept: INTERNAL MEDICINE | Facility: CLINIC | Age: 31
End: 2021-07-23

## 2021-07-27 ENCOUNTER — OFFICE VISIT (OUTPATIENT)
Dept: INTERNAL MEDICINE | Facility: CLINIC | Age: 31
End: 2021-07-27
Payer: MEDICAID

## 2021-07-27 VITALS
DIASTOLIC BLOOD PRESSURE: 83 MMHG | BODY MASS INDEX: 35.84 KG/M2 | WEIGHT: 294.31 LBS | HEIGHT: 76 IN | OXYGEN SATURATION: 98 % | HEART RATE: 71 BPM | SYSTOLIC BLOOD PRESSURE: 122 MMHG | TEMPERATURE: 98 F

## 2021-07-27 DIAGNOSIS — E78.2 MIXED HYPERLIPIDEMIA: ICD-10-CM

## 2021-07-27 DIAGNOSIS — R51.9 CHRONIC NONINTRACTABLE HEADACHE, UNSPECIFIED HEADACHE TYPE: Primary | ICD-10-CM

## 2021-07-27 DIAGNOSIS — D50.9 IRON DEFICIENCY ANEMIA, UNSPECIFIED IRON DEFICIENCY ANEMIA TYPE: ICD-10-CM

## 2021-07-27 DIAGNOSIS — Z01.00 VISIT FOR EYE AND VISION EXAM: ICD-10-CM

## 2021-07-27 DIAGNOSIS — G89.29 CHRONIC NONINTRACTABLE HEADACHE, UNSPECIFIED HEADACHE TYPE: Primary | ICD-10-CM

## 2021-07-27 PROCEDURE — 99213 PR OFFICE/OUTPT VISIT, EST, LEVL III, 20-29 MIN: ICD-10-PCS | Mod: S$PBB,,, | Performed by: INTERNAL MEDICINE

## 2021-07-27 PROCEDURE — 99999 PR PBB SHADOW E&M-EST. PATIENT-LVL III: ICD-10-PCS | Mod: PBBFAC,,, | Performed by: INTERNAL MEDICINE

## 2021-07-27 PROCEDURE — 99213 OFFICE O/P EST LOW 20 MIN: CPT | Mod: PBBFAC,PO | Performed by: INTERNAL MEDICINE

## 2021-07-27 PROCEDURE — 99213 OFFICE O/P EST LOW 20 MIN: CPT | Mod: S$PBB,,, | Performed by: INTERNAL MEDICINE

## 2021-07-27 PROCEDURE — 99999 PR PBB SHADOW E&M-EST. PATIENT-LVL III: CPT | Mod: PBBFAC,,, | Performed by: INTERNAL MEDICINE

## 2021-07-28 ENCOUNTER — PATIENT MESSAGE (OUTPATIENT)
Dept: INTERNAL MEDICINE | Facility: CLINIC | Age: 31
End: 2021-07-28

## 2021-07-28 ENCOUNTER — HOSPITAL ENCOUNTER (EMERGENCY)
Facility: HOSPITAL | Age: 31
Discharge: HOME | End: 2021-07-28
Attending: EMERGENCY MEDICINE
Payer: MEDICAID

## 2021-07-28 VITALS
RESPIRATION RATE: 18 BRPM | OXYGEN SATURATION: 100 % | HEART RATE: 63 BPM | TEMPERATURE: 99 F | SYSTOLIC BLOOD PRESSURE: 163 MMHG | DIASTOLIC BLOOD PRESSURE: 97 MMHG

## 2021-07-28 DIAGNOSIS — K21.9 GASTROESOPHAGEAL REFLUX DISEASE, UNSPECIFIED WHETHER ESOPHAGITIS PRESENT: Primary | ICD-10-CM

## 2021-07-28 DIAGNOSIS — R07.9 CHEST PAIN: ICD-10-CM

## 2021-07-28 LAB — TROPONIN I SERPL DL<=0.01 NG/ML-MCNC: <0.006 NG/ML (ref 0–0.03)

## 2021-07-28 PROCEDURE — 25000003 PHARM REV CODE 250: Performed by: EMERGENCY MEDICINE

## 2021-07-28 PROCEDURE — 99283 EMERGENCY DEPT VISIT LOW MDM: CPT

## 2021-07-28 PROCEDURE — 93010 EKG 12-LEAD: ICD-10-PCS | Mod: ,,, | Performed by: INTERNAL MEDICINE

## 2021-07-28 PROCEDURE — 84484 ASSAY OF TROPONIN QUANT: CPT | Performed by: EMERGENCY MEDICINE

## 2021-07-28 PROCEDURE — 93005 ELECTROCARDIOGRAM TRACING: CPT

## 2021-07-28 PROCEDURE — 93010 ELECTROCARDIOGRAM REPORT: CPT | Mod: ,,, | Performed by: INTERNAL MEDICINE

## 2021-07-28 RX ORDER — FAMOTIDINE 20 MG/1
40 TABLET, FILM COATED ORAL
Status: COMPLETED | OUTPATIENT
Start: 2021-07-28 | End: 2021-07-28

## 2021-07-28 RX ORDER — FAMOTIDINE 20 MG/1
20 TABLET, FILM COATED ORAL 2 TIMES DAILY
Qty: 60 TABLET | Refills: 0 | Status: SHIPPED | OUTPATIENT
Start: 2021-07-28 | End: 2021-08-05 | Stop reason: ALTCHOICE

## 2021-07-28 RX ADMIN — LIDOCAINE HYDROCHLORIDE: 20 SOLUTION ORAL; TOPICAL at 01:07

## 2021-07-28 RX ADMIN — FAMOTIDINE 40 MG: 20 TABLET ORAL at 01:07

## 2021-08-02 ENCOUNTER — LAB VISIT (OUTPATIENT)
Dept: LAB | Facility: HOSPITAL | Age: 31
End: 2021-08-02
Attending: INTERNAL MEDICINE
Payer: MEDICAID

## 2021-08-02 ENCOUNTER — PATIENT MESSAGE (OUTPATIENT)
Dept: INTERNAL MEDICINE | Facility: CLINIC | Age: 31
End: 2021-08-02

## 2021-08-02 DIAGNOSIS — D50.9 IRON DEFICIENCY ANEMIA, UNSPECIFIED IRON DEFICIENCY ANEMIA TYPE: ICD-10-CM

## 2021-08-02 PROBLEM — D64.9 ANEMIA: Status: RESOLVED | Noted: 2017-04-18 | Resolved: 2021-08-02

## 2021-08-02 PROBLEM — D56.0 ALPHA THALASSEMIA: Status: ACTIVE | Noted: 2021-08-02

## 2021-08-02 LAB — OB PNL STL: NEGATIVE

## 2021-08-02 PROCEDURE — 82272 OCCULT BLD FECES 1-3 TESTS: CPT | Performed by: INTERNAL MEDICINE

## 2021-08-03 ENCOUNTER — PATIENT MESSAGE (OUTPATIENT)
Dept: INTERNAL MEDICINE | Facility: CLINIC | Age: 31
End: 2021-08-03

## 2021-08-03 ENCOUNTER — TELEPHONE (OUTPATIENT)
Dept: INTERNAL MEDICINE | Facility: CLINIC | Age: 31
End: 2021-08-03

## 2021-08-05 RX ORDER — PANTOPRAZOLE SODIUM 40 MG/1
40 TABLET, DELAYED RELEASE ORAL DAILY
Qty: 30 TABLET | Refills: 2 | Status: SHIPPED | OUTPATIENT
Start: 2021-08-05 | End: 2021-11-03

## 2021-10-14 ENCOUNTER — TELEPHONE (OUTPATIENT)
Dept: INTERNAL MEDICINE | Facility: CLINIC | Age: 31
End: 2021-10-14

## 2021-10-14 ENCOUNTER — TELEPHONE (OUTPATIENT)
Dept: INTERNAL MEDICINE | Facility: CLINIC | Age: 31
End: 2021-10-14
Payer: MEDICAID

## 2021-11-04 ENCOUNTER — OFFICE VISIT (OUTPATIENT)
Dept: OPTOMETRY | Facility: CLINIC | Age: 31
End: 2021-11-04
Payer: MEDICAID

## 2021-11-04 DIAGNOSIS — F70 MILD INTELLECTUAL DISABILITY: ICD-10-CM

## 2021-11-04 DIAGNOSIS — F84.0 AUTISM SPECTRUM DISORDER: ICD-10-CM

## 2021-11-04 DIAGNOSIS — Z01.00 VISIT FOR EYE AND VISION EXAM: ICD-10-CM

## 2021-11-04 DIAGNOSIS — Z04.9 DISEASE RULED OUT AFTER EXAMINATION: ICD-10-CM

## 2021-11-04 DIAGNOSIS — H53.021 REFRACTIVE AMBLYOPIA OF RIGHT EYE: ICD-10-CM

## 2021-11-04 DIAGNOSIS — H52.203 ASTIGMATISM OF BOTH EYES, UNSPECIFIED TYPE: Primary | ICD-10-CM

## 2021-11-04 DIAGNOSIS — R51.9 INCREASED FREQUENCY OF HEADACHES: ICD-10-CM

## 2021-11-04 PROCEDURE — 92004 PR EYE EXAM, NEW PATIENT,COMPREHESV: ICD-10-PCS | Mod: S$PBB,,, | Performed by: OPTOMETRIST

## 2021-11-04 PROCEDURE — 92015 DETERMINE REFRACTIVE STATE: CPT | Mod: ,,, | Performed by: OPTOMETRIST

## 2021-11-04 PROCEDURE — 99999 PR PBB SHADOW E&M-EST. PATIENT-LVL III: ICD-10-PCS | Mod: PBBFAC,,, | Performed by: OPTOMETRIST

## 2021-11-04 PROCEDURE — 99213 OFFICE O/P EST LOW 20 MIN: CPT | Mod: PBBFAC | Performed by: OPTOMETRIST

## 2021-11-04 PROCEDURE — 99999 PR PBB SHADOW E&M-EST. PATIENT-LVL III: CPT | Mod: PBBFAC,,, | Performed by: OPTOMETRIST

## 2021-11-04 PROCEDURE — 92004 COMPRE OPH EXAM NEW PT 1/>: CPT | Mod: S$PBB,,, | Performed by: OPTOMETRIST

## 2021-11-04 PROCEDURE — 92015 PR REFRACTION: ICD-10-PCS | Mod: ,,, | Performed by: OPTOMETRIST

## 2021-11-16 ENCOUNTER — TELEPHONE (OUTPATIENT)
Dept: INTERNAL MEDICINE | Facility: CLINIC | Age: 31
End: 2021-11-16
Payer: MEDICAID

## 2021-11-17 ENCOUNTER — TELEPHONE (OUTPATIENT)
Dept: INTERNAL MEDICINE | Facility: CLINIC | Age: 31
End: 2021-11-17
Payer: MEDICAID

## 2021-12-22 ENCOUNTER — OFFICE VISIT (OUTPATIENT)
Dept: INTERNAL MEDICINE | Facility: CLINIC | Age: 31
End: 2021-12-22
Payer: MEDICAID

## 2021-12-22 VITALS
HEART RATE: 76 BPM | SYSTOLIC BLOOD PRESSURE: 144 MMHG | BODY MASS INDEX: 37.83 KG/M2 | RESPIRATION RATE: 18 BRPM | TEMPERATURE: 97 F | WEIGHT: 310.63 LBS | HEIGHT: 76 IN | DIASTOLIC BLOOD PRESSURE: 96 MMHG | OXYGEN SATURATION: 98 %

## 2021-12-22 DIAGNOSIS — E78.2 MIXED HYPERLIPIDEMIA: ICD-10-CM

## 2021-12-22 DIAGNOSIS — Z00.00 ANNUAL PHYSICAL EXAM: Primary | ICD-10-CM

## 2021-12-22 DIAGNOSIS — Z79.899 LONG TERM CURRENT USE OF HIGH DOSE ACETAMINOPHEN: ICD-10-CM

## 2021-12-22 DIAGNOSIS — R51.9 FREQUENT HEADACHES: ICD-10-CM

## 2021-12-22 DIAGNOSIS — I10 ESSENTIAL HYPERTENSION: ICD-10-CM

## 2021-12-22 DIAGNOSIS — D56.0 ALPHA THALASSEMIA: ICD-10-CM

## 2021-12-22 DIAGNOSIS — F40.10 SOCIAL ANXIETY DISORDER: ICD-10-CM

## 2021-12-22 DIAGNOSIS — F84.0 AUTISM SPECTRUM DISORDER: ICD-10-CM

## 2021-12-22 PROCEDURE — 3080F PR MOST RECENT DIASTOLIC BLOOD PRESSURE >= 90 MM HG: ICD-10-PCS | Mod: CPTII,,, | Performed by: INTERNAL MEDICINE

## 2021-12-22 PROCEDURE — 3077F SYST BP >= 140 MM HG: CPT | Mod: CPTII,,, | Performed by: INTERNAL MEDICINE

## 2021-12-22 PROCEDURE — 99214 PR OFFICE/OUTPT VISIT, EST, LEVL IV, 30-39 MIN: ICD-10-PCS | Mod: S$PBB,,, | Performed by: INTERNAL MEDICINE

## 2021-12-22 PROCEDURE — 90471 IMMUNIZATION ADMIN: CPT | Mod: PBBFAC,PO

## 2021-12-22 PROCEDURE — 3077F PR MOST RECENT SYSTOLIC BLOOD PRESSURE >= 140 MM HG: ICD-10-PCS | Mod: CPTII,,, | Performed by: INTERNAL MEDICINE

## 2021-12-22 PROCEDURE — 1159F MED LIST DOCD IN RCRD: CPT | Mod: CPTII,,, | Performed by: INTERNAL MEDICINE

## 2021-12-22 PROCEDURE — 99999 PR PBB SHADOW E&M-EST. PATIENT-LVL V: ICD-10-PCS | Mod: PBBFAC,,, | Performed by: INTERNAL MEDICINE

## 2021-12-22 PROCEDURE — 99214 OFFICE O/P EST MOD 30 MIN: CPT | Mod: S$PBB,,, | Performed by: INTERNAL MEDICINE

## 2021-12-22 PROCEDURE — 3008F BODY MASS INDEX DOCD: CPT | Mod: CPTII,,, | Performed by: INTERNAL MEDICINE

## 2021-12-22 PROCEDURE — 1160F RVW MEDS BY RX/DR IN RCRD: CPT | Mod: CPTII,,, | Performed by: INTERNAL MEDICINE

## 2021-12-22 PROCEDURE — 99215 OFFICE O/P EST HI 40 MIN: CPT | Mod: PBBFAC,PO | Performed by: INTERNAL MEDICINE

## 2021-12-22 PROCEDURE — 3080F DIAST BP >= 90 MM HG: CPT | Mod: CPTII,,, | Performed by: INTERNAL MEDICINE

## 2021-12-22 PROCEDURE — 1159F PR MEDICATION LIST DOCUMENTED IN MEDICAL RECORD: ICD-10-PCS | Mod: CPTII,,, | Performed by: INTERNAL MEDICINE

## 2021-12-22 PROCEDURE — 3008F PR BODY MASS INDEX (BMI) DOCUMENTED: ICD-10-PCS | Mod: CPTII,,, | Performed by: INTERNAL MEDICINE

## 2021-12-22 PROCEDURE — 99999 PR PBB SHADOW E&M-EST. PATIENT-LVL V: CPT | Mod: PBBFAC,,, | Performed by: INTERNAL MEDICINE

## 2021-12-22 PROCEDURE — 1160F PR REVIEW ALL MEDS BY PRESCRIBER/CLIN PHARMACIST DOCUMENTED: ICD-10-PCS | Mod: CPTII,,, | Performed by: INTERNAL MEDICINE

## 2021-12-23 ENCOUNTER — LAB VISIT (OUTPATIENT)
Dept: LAB | Facility: HOSPITAL | Age: 31
End: 2021-12-23
Attending: INTERNAL MEDICINE
Payer: MEDICAID

## 2021-12-23 DIAGNOSIS — D56.0 ALPHA THALASSEMIA: ICD-10-CM

## 2021-12-23 DIAGNOSIS — Z79.899 LONG TERM CURRENT USE OF HIGH DOSE ACETAMINOPHEN: ICD-10-CM

## 2021-12-23 DIAGNOSIS — I10 ESSENTIAL HYPERTENSION: ICD-10-CM

## 2021-12-23 DIAGNOSIS — E78.2 MIXED HYPERLIPIDEMIA: ICD-10-CM

## 2021-12-23 DIAGNOSIS — Z00.00 ANNUAL PHYSICAL EXAM: ICD-10-CM

## 2021-12-23 LAB
ALBUMIN SERPL BCP-MCNC: 4.2 G/DL (ref 3.5–5.2)
ALP SERPL-CCNC: 41 U/L (ref 55–135)
ALT SERPL W/O P-5'-P-CCNC: 66 U/L (ref 10–44)
ANION GAP SERPL CALC-SCNC: 12 MMOL/L (ref 8–16)
APAP SERPL-MCNC: 4 UG/ML (ref 10–20)
AST SERPL-CCNC: 42 U/L (ref 10–40)
BASOPHILS # BLD AUTO: 0.04 K/UL (ref 0–0.2)
BASOPHILS NFR BLD: 0.7 % (ref 0–1.9)
BILIRUB SERPL-MCNC: 0.4 MG/DL (ref 0.1–1)
BUN SERPL-MCNC: 10 MG/DL (ref 6–20)
CALCIUM SERPL-MCNC: 9.9 MG/DL (ref 8.7–10.5)
CHLORIDE SERPL-SCNC: 106 MMOL/L (ref 95–110)
CHOLEST SERPL-MCNC: 231 MG/DL (ref 120–199)
CHOLEST/HDLC SERPL: 5.5 {RATIO} (ref 2–5)
CO2 SERPL-SCNC: 22 MMOL/L (ref 23–29)
CREAT SERPL-MCNC: 0.9 MG/DL (ref 0.5–1.4)
DIFFERENTIAL METHOD: ABNORMAL
EOSINOPHIL # BLD AUTO: 0.2 K/UL (ref 0–0.5)
EOSINOPHIL NFR BLD: 3.7 % (ref 0–8)
ERYTHROCYTE [DISTWIDTH] IN BLOOD BY AUTOMATED COUNT: 18.9 % (ref 11.5–14.5)
EST. GFR  (AFRICAN AMERICAN): >60 ML/MIN/1.73 M^2
EST. GFR  (NON AFRICAN AMERICAN): >60 ML/MIN/1.73 M^2
ESTIMATED AVG GLUCOSE: 128 MG/DL (ref 68–131)
GLUCOSE SERPL-MCNC: 129 MG/DL (ref 70–110)
HBA1C MFR BLD: 6.1 % (ref 4–5.6)
HCT VFR BLD AUTO: 34.7 % (ref 40–54)
HDLC SERPL-MCNC: 42 MG/DL (ref 40–75)
HDLC SERPL: 18.2 % (ref 20–50)
HGB BLD-MCNC: 10.7 G/DL (ref 14–18)
IMM GRANULOCYTES # BLD AUTO: 0.05 K/UL (ref 0–0.04)
IMM GRANULOCYTES NFR BLD AUTO: 0.9 % (ref 0–0.5)
LDLC SERPL CALC-MCNC: 142.4 MG/DL (ref 63–159)
LYMPHOCYTES # BLD AUTO: 2.2 K/UL (ref 1–4.8)
LYMPHOCYTES NFR BLD: 38.3 % (ref 18–48)
MCH RBC QN AUTO: 22.8 PG (ref 27–31)
MCHC RBC AUTO-ENTMCNC: 30.8 G/DL (ref 32–36)
MCV RBC AUTO: 74 FL (ref 82–98)
MONOCYTES # BLD AUTO: 0.5 K/UL (ref 0.3–1)
MONOCYTES NFR BLD: 8.4 % (ref 4–15)
NEUTROPHILS # BLD AUTO: 2.8 K/UL (ref 1.8–7.7)
NEUTROPHILS NFR BLD: 48 % (ref 38–73)
NONHDLC SERPL-MCNC: 189 MG/DL
NRBC BLD-RTO: 0 /100 WBC
PLATELET # BLD AUTO: 262 K/UL (ref 150–450)
PMV BLD AUTO: 9.7 FL (ref 9.2–12.9)
POTASSIUM SERPL-SCNC: 4.4 MMOL/L (ref 3.5–5.1)
PROT SERPL-MCNC: 7.9 G/DL (ref 6–8.4)
RBC # BLD AUTO: 4.69 M/UL (ref 4.6–6.2)
SODIUM SERPL-SCNC: 140 MMOL/L (ref 136–145)
TRIGL SERPL-MCNC: 233 MG/DL (ref 30–150)
TSH SERPL DL<=0.005 MIU/L-ACNC: 1.44 UIU/ML (ref 0.4–4)
WBC # BLD AUTO: 5.74 K/UL (ref 3.9–12.7)

## 2021-12-23 PROCEDURE — 36415 COLL VENOUS BLD VENIPUNCTURE: CPT | Performed by: INTERNAL MEDICINE

## 2021-12-23 PROCEDURE — 83036 HEMOGLOBIN GLYCOSYLATED A1C: CPT | Performed by: INTERNAL MEDICINE

## 2021-12-23 PROCEDURE — 84443 ASSAY THYROID STIM HORMONE: CPT | Performed by: INTERNAL MEDICINE

## 2021-12-23 PROCEDURE — 80143 DRUG ASSAY ACETAMINOPHEN: CPT | Performed by: INTERNAL MEDICINE

## 2021-12-23 PROCEDURE — 85025 COMPLETE CBC W/AUTO DIFF WBC: CPT | Performed by: INTERNAL MEDICINE

## 2021-12-23 PROCEDURE — 80053 COMPREHEN METABOLIC PANEL: CPT | Performed by: INTERNAL MEDICINE

## 2021-12-23 PROCEDURE — 80061 LIPID PANEL: CPT | Performed by: INTERNAL MEDICINE

## 2021-12-28 ENCOUNTER — TELEPHONE (OUTPATIENT)
Dept: NEUROLOGY | Facility: CLINIC | Age: 31
End: 2021-12-28
Payer: MEDICAID

## 2022-01-24 ENCOUNTER — OFFICE VISIT (OUTPATIENT)
Dept: INTERNAL MEDICINE | Facility: CLINIC | Age: 32
End: 2022-01-24
Payer: MEDICAID

## 2022-01-24 VITALS
BODY MASS INDEX: 38.36 KG/M2 | HEIGHT: 76 IN | DIASTOLIC BLOOD PRESSURE: 98 MMHG | WEIGHT: 315 LBS | RESPIRATION RATE: 18 BRPM | HEART RATE: 72 BPM | SYSTOLIC BLOOD PRESSURE: 140 MMHG | TEMPERATURE: 97 F | OXYGEN SATURATION: 98 %

## 2022-01-24 DIAGNOSIS — D56.0 ALPHA THALASSEMIA: ICD-10-CM

## 2022-01-24 DIAGNOSIS — R51.9 FREQUENT HEADACHES: ICD-10-CM

## 2022-01-24 DIAGNOSIS — E78.2 MIXED HYPERLIPIDEMIA: ICD-10-CM

## 2022-01-24 DIAGNOSIS — I10 ESSENTIAL HYPERTENSION: Primary | ICD-10-CM

## 2022-01-24 PROCEDURE — 3077F SYST BP >= 140 MM HG: CPT | Mod: CPTII,,, | Performed by: INTERNAL MEDICINE

## 2022-01-24 PROCEDURE — 99999 PR PBB SHADOW E&M-EST. PATIENT-LVL IV: CPT | Mod: PBBFAC,,, | Performed by: INTERNAL MEDICINE

## 2022-01-24 PROCEDURE — 3080F DIAST BP >= 90 MM HG: CPT | Mod: CPTII,,, | Performed by: INTERNAL MEDICINE

## 2022-01-24 PROCEDURE — 99999 PR PBB SHADOW E&M-EST. PATIENT-LVL IV: ICD-10-PCS | Mod: PBBFAC,,, | Performed by: INTERNAL MEDICINE

## 2022-01-24 PROCEDURE — 1159F PR MEDICATION LIST DOCUMENTED IN MEDICAL RECORD: ICD-10-PCS | Mod: CPTII,,, | Performed by: INTERNAL MEDICINE

## 2022-01-24 PROCEDURE — 4010F ACE/ARB THERAPY RXD/TAKEN: CPT | Mod: CPTII,,, | Performed by: INTERNAL MEDICINE

## 2022-01-24 PROCEDURE — 3080F PR MOST RECENT DIASTOLIC BLOOD PRESSURE >= 90 MM HG: ICD-10-PCS | Mod: CPTII,,, | Performed by: INTERNAL MEDICINE

## 2022-01-24 PROCEDURE — 3008F PR BODY MASS INDEX (BMI) DOCUMENTED: ICD-10-PCS | Mod: CPTII,,, | Performed by: INTERNAL MEDICINE

## 2022-01-24 PROCEDURE — 99214 OFFICE O/P EST MOD 30 MIN: CPT | Mod: PBBFAC,PO | Performed by: INTERNAL MEDICINE

## 2022-01-24 PROCEDURE — 90714 TD VACC NO PRESV 7 YRS+ IM: CPT | Mod: PBBFAC,PO

## 2022-01-24 PROCEDURE — 1160F PR REVIEW ALL MEDS BY PRESCRIBER/CLIN PHARMACIST DOCUMENTED: ICD-10-PCS | Mod: CPTII,,, | Performed by: INTERNAL MEDICINE

## 2022-01-24 PROCEDURE — 1160F RVW MEDS BY RX/DR IN RCRD: CPT | Mod: CPTII,,, | Performed by: INTERNAL MEDICINE

## 2022-01-24 PROCEDURE — 4010F PR ACE/ARB THEARPY RXD/TAKEN: ICD-10-PCS | Mod: CPTII,,, | Performed by: INTERNAL MEDICINE

## 2022-01-24 PROCEDURE — 99214 PR OFFICE/OUTPT VISIT, EST, LEVL IV, 30-39 MIN: ICD-10-PCS | Mod: S$PBB,,, | Performed by: INTERNAL MEDICINE

## 2022-01-24 PROCEDURE — 1159F MED LIST DOCD IN RCRD: CPT | Mod: CPTII,,, | Performed by: INTERNAL MEDICINE

## 2022-01-24 PROCEDURE — 3077F PR MOST RECENT SYSTOLIC BLOOD PRESSURE >= 140 MM HG: ICD-10-PCS | Mod: CPTII,,, | Performed by: INTERNAL MEDICINE

## 2022-01-24 PROCEDURE — 3008F BODY MASS INDEX DOCD: CPT | Mod: CPTII,,, | Performed by: INTERNAL MEDICINE

## 2022-01-24 PROCEDURE — 99214 OFFICE O/P EST MOD 30 MIN: CPT | Mod: S$PBB,,, | Performed by: INTERNAL MEDICINE

## 2022-01-24 RX ORDER — LOSARTAN POTASSIUM 25 MG/1
25 TABLET ORAL DAILY
Qty: 90 TABLET | Refills: 3 | Status: SHIPPED | OUTPATIENT
Start: 2022-01-24 | End: 2023-01-26

## 2022-01-24 NOTE — PROGRESS NOTES
Subjective:     Seun Wynne is a 31 y.o. male who presents for   Chief Complaint   Patient presents with    Hypertension     Follow-up - hasn't been checking bp at home    Hyperlipidemia    Headache       HPI    Hypertension: The patient has been no chest pain on exertion, no dyspnea on exertion and no swelling of ankles. He has never taken medication for HTN.    BP Readings from Last 3 Encounters:   01/24/22 (!) 140/98   12/22/21 (!) 144/96   07/28/21 (!) 163/97       Hyperlipidemia: Compliance with treatment has been good. The patient exercises never. Patient denies muscle pain associated with his medications. Previous history of cardiac disease includes: none. Lipid panel readings are above goal.    Lab Results   Component Value Date    CHOL 231 (H) 12/23/2021    HDL 42 12/23/2021        Headache: The patient presents for evaluation of headache. Symptoms began about several months ago. He cannot rate the severity of the headaches but shows that it is a frontal headache. He uses tylenol as needed for the pain and he uses it frequently.    Wt Readings from Last 3 Encounters:   01/24/22 (!) 145.2 kg (320 lb 1.7 oz)   12/22/21 (!) 140.9 kg (310 lb 10.1 oz)   07/27/21 133.5 kg (294 lb 5 oz)   -- weight increased 30 lbs in the last 6 months      Review of Systems   Reason unable to perform ROS: developmental disorder.   HENT: Negative for rhinorrhea.    Respiratory: Negative for cough.    Cardiovascular: Negative for chest pain.   Gastrointestinal: Negative for abdominal pain, diarrhea, nausea and vomiting.   Musculoskeletal: Negative for arthralgias and myalgias.   Neurological: Positive for headaches (daughter notices that he uses a lot of tylenol daily).   Psychiatric/Behavioral: The patient is nervous/anxious.         Objective:     Physical Exam  Constitutional:       General: He is awake.      Appearance: Normal appearance. He is well-developed and well-groomed.   HENT:      Head: Normocephalic and  atraumatic.      Right Ear: Hearing and external ear normal.      Left Ear: Hearing and external ear normal.      Nose: Nose normal.   Eyes:      General: Lids are normal.      Conjunctiva/sclera:      Right eye: No hemorrhage.     Left eye: No hemorrhage.     Pupils: Pupils are equal, round, and reactive to light.   Cardiovascular:      Rate and Rhythm: Normal rate and regular rhythm.      Heart sounds: No murmur heard.      Pulmonary:      Effort: Pulmonary effort is normal. No respiratory distress.      Breath sounds: Normal breath sounds. No decreased breath sounds or wheezing.   Abdominal:      General: Bowel sounds are normal. There is no distension.   Musculoskeletal:         General: Normal range of motion.      Right lower leg: No edema.      Left lower leg: No edema.   Skin:     General: Skin is warm and dry.   Neurological:      Mental Status: He is alert. Mental status is at baseline.   Psychiatric:         Speech: Speech is delayed.          Assessment:      1. Essential hypertension    2. Mixed hyperlipidemia    3. Frequent headaches    4. Alpha thalassemia           Plan:     1. Essential hypertension  - start losartan, will adjust dose as needed  - losartan (COZAAR) 25 MG tablet; Take 1 tablet (25 mg total) by mouth once daily.  Dispense: 90 tablet; Refill: 3    2. Mixed hyperlipidemia  - lipid panel not at goal, reduce intake of fatty snacks  - continue Crestor, repeat lipid panel, may need to increase dose    3. Frequent headaches  - awaiting Neurology referral    4. Alpha thalassemia  - blood counts are stable    RTC in 3 months for follow-up or sooner if needed    __________________________    Karla Arce MD, PharmD  [facility]- Internal Medicine  American Board of Obesity Medicine diplomate  Office [phone]

## 2022-02-05 NOTE — TELEPHONE ENCOUNTER
No new care gaps identified.  Powered by [employer]. Reference number: [id number].   2/04/2022 7:34:10 PM CST

## 2022-02-06 DIAGNOSIS — R12 HEARTBURN: Primary | ICD-10-CM

## 2022-02-06 RX ORDER — PANTOPRAZOLE SODIUM 40 MG/1
40 TABLET, DELAYED RELEASE ORAL DAILY
Qty: 90 TABLET | Refills: 1 | Status: SHIPPED | OUTPATIENT
Start: 2022-02-06 | End: 2022-02-06

## 2022-02-06 RX ORDER — PANTOPRAZOLE SODIUM 40 MG/1
TABLET, DELAYED RELEASE ORAL
Qty: 90 TABLET | Refills: 1 | Status: SHIPPED | OUTPATIENT
Start: 2022-02-06 | End: 2022-02-10 | Stop reason: ALTCHOICE

## 2022-02-06 NOTE — TELEPHONE ENCOUNTER
No new care gaps identified.  Powered by [employer]. Reference number: [id number].   2/06/2022 4:12:33 PM CST

## 2022-02-08 ENCOUNTER — TELEPHONE (OUTPATIENT)
Dept: INTERNAL MEDICINE | Facility: CLINIC | Age: 32
End: 2022-02-08
Payer: MEDICAID

## 2022-02-08 DIAGNOSIS — R12 HEARTBURN: Primary | ICD-10-CM

## 2022-02-10 RX ORDER — OMEPRAZOLE 40 MG/1
40 CAPSULE, DELAYED RELEASE ORAL DAILY
Qty: 30 CAPSULE | Refills: 2 | Status: SHIPPED | OUTPATIENT
Start: 2022-02-10 | End: 2022-03-02 | Stop reason: SDUPTHER

## 2022-02-12 PROBLEM — R73.03 PREDIABETES: Status: ACTIVE | Noted: 2022-02-12

## 2022-02-17 ENCOUNTER — PATIENT OUTREACH (OUTPATIENT)
Dept: ADMINISTRATIVE | Facility: HOSPITAL | Age: 32
End: 2022-02-17
Payer: MEDICAID

## 2022-02-17 ENCOUNTER — PATIENT MESSAGE (OUTPATIENT)
Dept: ADMINISTRATIVE | Facility: HOSPITAL | Age: 32
End: 2022-02-17
Payer: MEDICAID

## 2022-03-02 ENCOUNTER — PATIENT MESSAGE (OUTPATIENT)
Dept: INTERNAL MEDICINE | Facility: CLINIC | Age: 32
End: 2022-03-02
Payer: MEDICAID

## 2022-03-02 DIAGNOSIS — R12 HEARTBURN: ICD-10-CM

## 2022-03-02 NOTE — TELEPHONE ENCOUNTER
No new care gaps identified.  Powered by [employer]. Reference number: [id number].   3/02/2022 4:39:02 PM CST

## 2022-03-02 NOTE — TELEPHONE ENCOUNTER
No new care gaps identified.  Powered by [employer]. Reference number: [id number].   3/02/2022 4:30:45 AM CST

## 2022-03-03 RX ORDER — OMEPRAZOLE 40 MG/1
40 CAPSULE, DELAYED RELEASE ORAL DAILY
Qty: 30 CAPSULE | Refills: 2 | Status: SHIPPED | OUTPATIENT
Start: 2022-03-03 | End: 2022-04-12

## 2022-03-04 RX ORDER — ROSUVASTATIN CALCIUM 20 MG/1
20 TABLET, COATED ORAL DAILY
Qty: 90 TABLET | Refills: 3 | Status: SHIPPED | OUTPATIENT
Start: 2022-03-04 | End: 2022-08-15 | Stop reason: SDUPTHER

## 2022-03-04 NOTE — TELEPHONE ENCOUNTER
Refill Authorization Note   Seun Wynne  is requesting a refill authorization.  Brief Assessment and Rationale for Refill:  Approve     Medication Therapy Plan:       Medication Reconciliation Completed: No   Comments:   --->Care Gap information included below if applicable.   Orders Placed This Encounter    rosuvastatin (CRESTOR) 20 MG tablet      Requested Prescriptions   Signed Prescriptions Disp Refills    rosuvastatin (CRESTOR) 20 MG tablet 90 tablet 3     Sig: Take 1 tablet (20 mg total) by mouth once daily.       Cardiovascular:  Antilipid - Statins Passed - 3/2/2022  4:30 AM        Passed - Patient is at least 18 years old        Passed - Valid encounter within last 15 months     Recent Visits  Date Type Provider Dept   01/24/22 Office Visit Karla Arce MD [facility] Internal Medicine   12/22/21 Office Visit Karla Arce MD [facility] Internal Medicine   07/27/21 Office Visit Karla Arce MD [facility] Internal Medicine   11/11/20 Office Visit Karla Arce MD [facility] Internal Medicine   Showing recent visits within past 720 days and meeting all other requirements  Future Appointments  No visits were found meeting these conditions.  Showing future appointments within next 150 days and meeting all other requirements      Future Appointments              In 1 month DO Michael Humphreyner - Neurology, [facility]    In 1 month Karla Arce MD [facility] - Internal Medicine, [place]                Passed - ALT is 131 or below and within 360 days     ALT   Date Value Ref Range Status   12/23/2021 66 (H) 10 - 44 U/L Final   07/28/2021 60 (H) 10 - 44 U/L Final   11/04/2020 41 10 - 44 U/L Final              Passed - AST is 119 or below and within 360 days     AST   Date Value Ref Range Status   12/23/2021 42 (H) 10 - 40 U/L Final   07/28/2021 44 (H) 10 - 40 U/L Final     Comment:     Specimen slightly hemolyzed   11/04/2020 35 10 - 40 U/L Final     Comment:     Specimen slightly hemolyzed               Passed - Total Cholesterol within 360 days     Lab Results   Component Value Date    CHOL 231 (H) 12/23/2021    CHOL 226 (H) 11/04/2020    CHOL 285 (H) 03/05/2020              Passed - LDL within 360 days     LDL Cholesterol   Date Value Ref Range Status   12/23/2021 142.4 63.0 - 159.0 mg/dL Final     Comment:     The National Cholesterol Education Program (NCEP) has set the  following guidelines (reference values) for LDL Cholesterol:  Optimal.......................<130 mg/dL  Borderline High...............130-159 mg/dL  High..........................160-189 mg/dL  Very High.....................>190 mg/dL              Passed - HDL within 360 days     HDL   Date Value Ref Range Status   12/23/2021 42 40 - 75 mg/dL Final     Comment:     The National Cholesterol Education Program (NCEP) has set the  following guidelines (reference values) for HDL Cholesterol:  Low...............<40 mg/dL  Optimal...........>60 mg/dL              Passed - Triglycerides within 360 days     Lab Results   Component Value Date    TRIG 233 (H) 12/23/2021    TRIG 177 (H) 11/04/2020    TRIG 441 (H) 03/05/2020                  Appointments  past 12m or future 3m with PCP    Date Provider   Last Visit   1/24/2022 Karla Arce MD   Next Visit   4/25/2022 Karla Arce MD   ED visits in past 90 days: 0     Note composed:1:54 PM 03/04/2022

## 2022-03-07 ENCOUNTER — PATIENT MESSAGE (OUTPATIENT)
Dept: INTERNAL MEDICINE | Facility: CLINIC | Age: 32
End: 2022-03-07
Payer: MEDICAID

## 2022-03-07 NOTE — TELEPHONE ENCOUNTER
Reviewed old notes from GI and EGD notes. There were no signs of ulcers or stomach lining inflammation on the EGD. There are no indications that I can use other than heartburn.        Does patient have any abdominal pain when he does not take Prilosec? If he does have abdominal pain and acid reflux, then may need to switch to another agent and may also need to see GI again.

## 2022-03-12 ENCOUNTER — PATIENT MESSAGE (OUTPATIENT)
Dept: INTERNAL MEDICINE | Facility: CLINIC | Age: 32
End: 2022-03-12
Payer: MEDICAID

## 2022-03-12 ENCOUNTER — PATIENT MESSAGE (OUTPATIENT)
Dept: ADMINISTRATIVE | Facility: HOSPITAL | Age: 32
End: 2022-03-12
Payer: MEDICAID

## 2022-03-14 ENCOUNTER — TELEPHONE (OUTPATIENT)
Dept: INTERNAL MEDICINE | Facility: CLINIC | Age: 32
End: 2022-03-14
Payer: MEDICAID

## 2022-03-14 ENCOUNTER — PATIENT OUTREACH (OUTPATIENT)
Dept: ADMINISTRATIVE | Facility: HOSPITAL | Age: 32
End: 2022-03-14
Payer: MEDICAID

## 2022-04-11 ENCOUNTER — PATIENT OUTREACH (OUTPATIENT)
Dept: ADMINISTRATIVE | Facility: OTHER | Age: 32
End: 2022-04-11
Payer: MEDICAID

## 2022-04-11 NOTE — PROGRESS NOTES
Health Maintenance Due   Topic Date Due    Pneumococcal Vaccines (Age 0-64) (1 - PCV) Never done    HIV Screening  Never done     Updates were requested from care everywhere.  Chart was reviewed for overdue [facility] Encounters ([facility]) topics (CRS, Breast Cancer Screening, Eye exam)  Health Maintenance has been updated.  LINKS immunization registry triggered.  Immunizations were reconciled.

## 2022-04-12 ENCOUNTER — OFFICE VISIT (OUTPATIENT)
Dept: NEUROLOGY | Facility: CLINIC | Age: 32
End: 2022-04-12
Payer: MEDICAID

## 2022-04-12 VITALS
SYSTOLIC BLOOD PRESSURE: 133 MMHG | HEIGHT: 76 IN | BODY MASS INDEX: 38.36 KG/M2 | WEIGHT: 315 LBS | HEART RATE: 70 BPM | DIASTOLIC BLOOD PRESSURE: 76 MMHG

## 2022-04-12 DIAGNOSIS — F70 MILD INTELLECTUAL DISABILITY: ICD-10-CM

## 2022-04-12 DIAGNOSIS — F84.0 AUTISM SPECTRUM DISORDER: ICD-10-CM

## 2022-04-12 DIAGNOSIS — G43.709 CHRONIC MIGRAINE WITHOUT AURA WITHOUT STATUS MIGRAINOSUS, NOT INTRACTABLE: Primary | ICD-10-CM

## 2022-04-12 DIAGNOSIS — G44.40 MEDICATION OVERUSE HEADACHE: ICD-10-CM

## 2022-04-12 PROCEDURE — 4010F ACE/ARB THERAPY RXD/TAKEN: CPT | Mod: CPTII,,, | Performed by: PSYCHIATRY & NEUROLOGY

## 2022-04-12 PROCEDURE — 3078F PR MOST RECENT DIASTOLIC BLOOD PRESSURE < 80 MM HG: ICD-10-PCS | Mod: CPTII,,, | Performed by: PSYCHIATRY & NEUROLOGY

## 2022-04-12 PROCEDURE — 3008F BODY MASS INDEX DOCD: CPT | Mod: CPTII,,, | Performed by: PSYCHIATRY & NEUROLOGY

## 2022-04-12 PROCEDURE — 1159F PR MEDICATION LIST DOCUMENTED IN MEDICAL RECORD: ICD-10-PCS | Mod: CPTII,,, | Performed by: PSYCHIATRY & NEUROLOGY

## 2022-04-12 PROCEDURE — 3008F PR BODY MASS INDEX (BMI) DOCUMENTED: ICD-10-PCS | Mod: CPTII,,, | Performed by: PSYCHIATRY & NEUROLOGY

## 2022-04-12 PROCEDURE — 3075F SYST BP GE 130 - 139MM HG: CPT | Mod: CPTII,,, | Performed by: PSYCHIATRY & NEUROLOGY

## 2022-04-12 PROCEDURE — 1160F RVW MEDS BY RX/DR IN RCRD: CPT | Mod: CPTII,,, | Performed by: PSYCHIATRY & NEUROLOGY

## 2022-04-12 PROCEDURE — 1160F PR REVIEW ALL MEDS BY PRESCRIBER/CLIN PHARMACIST DOCUMENTED: ICD-10-PCS | Mod: CPTII,,, | Performed by: PSYCHIATRY & NEUROLOGY

## 2022-04-12 PROCEDURE — 3078F DIAST BP <80 MM HG: CPT | Mod: CPTII,,, | Performed by: PSYCHIATRY & NEUROLOGY

## 2022-04-12 PROCEDURE — 1159F MED LIST DOCD IN RCRD: CPT | Mod: CPTII,,, | Performed by: PSYCHIATRY & NEUROLOGY

## 2022-04-12 PROCEDURE — 99999 PR PBB SHADOW E&M-EST. PATIENT-LVL III: ICD-10-PCS | Mod: PBBFAC,,, | Performed by: PSYCHIATRY & NEUROLOGY

## 2022-04-12 PROCEDURE — 3075F PR MOST RECENT SYSTOLIC BLOOD PRESS GE 130-139MM HG: ICD-10-PCS | Mod: CPTII,,, | Performed by: PSYCHIATRY & NEUROLOGY

## 2022-04-12 PROCEDURE — 99999 PR PBB SHADOW E&M-EST. PATIENT-LVL III: CPT | Mod: PBBFAC,,, | Performed by: PSYCHIATRY & NEUROLOGY

## 2022-04-12 PROCEDURE — 99213 OFFICE O/P EST LOW 20 MIN: CPT | Mod: PBBFAC,PO | Performed by: PSYCHIATRY & NEUROLOGY

## 2022-04-12 PROCEDURE — 4010F PR ACE/ARB THEARPY RXD/TAKEN: ICD-10-PCS | Mod: CPTII,,, | Performed by: PSYCHIATRY & NEUROLOGY

## 2022-04-12 PROCEDURE — 99204 PR OFFICE/OUTPT VISIT, NEW, LEVL IV, 45-59 MIN: ICD-10-PCS | Mod: S$PBB,,, | Performed by: PSYCHIATRY & NEUROLOGY

## 2022-04-12 PROCEDURE — 99204 OFFICE O/P NEW MOD 45 MIN: CPT | Mod: S$PBB,,, | Performed by: PSYCHIATRY & NEUROLOGY

## 2022-04-12 RX ORDER — PANTOPRAZOLE SODIUM 40 MG/1
40 TABLET, DELAYED RELEASE ORAL DAILY
COMMUNITY
Start: 2022-03-31 | End: 2022-07-28

## 2022-04-12 RX ORDER — PROPRANOLOL HYDROCHLORIDE 20 MG/1
20 TABLET ORAL DAILY
Qty: 30 TABLET | Refills: 11 | Status: SHIPPED | OUTPATIENT
Start: 2022-04-12 | End: 2022-05-24

## 2022-04-12 NOTE — PROGRESS NOTES
"NEUROLOGY HEADACHE NOTE?     CHIEF COMPLAINT?   Mr. Seun Wynne chief concern is uncontrolled headache.     Name of the referring physician Karla Arce MD     Name of primary care provider Karla Arce MD      Subjective:    HPI:  Mr. Wynne presents today to discuss his ongoing uncontrolled headaches.  He is joined by his mother at the time today's encounter who provides much of the history. Seun was helpful in providing some specifics of the headache qualities but his mother was a valuable resource in collecting facts about over-the-counter medication use and other elements of history.  Clinical encounter consistent with the patient's hx.    Age of onset: 29  When did they become more of a problem: 30 YO  # of Total headache days per month: 20-25  # of Migraine days per month: 10+  Intensity of average and most severe headaches: 3-5/10, 7-8/10  Duration of headache pain: >4 hrs untreated  Quality of pain: Pulsating/Throbbing  Laterality: bilateral  Location: frontal   Photophobia and phonophobia: no  Nausea and vomiting: yes  Causes avoidance of physical activity: yes  Worsened by physical activity: yes  Preventive Medications failed: none  Acute medications failed: renu  OTC Medications: tylenol (30 days per month, over a year)  Hx of (Bolded items are positive): depression, anxiety, fibromyalgia, autoimmune disorder, head trauma, neurological infection, glaucoma, asthma, nephrolithiasis, MI, stroke  Is medication overuse contributing to the patient's current migraine burden: YES  Aura: no  Autonomic symptoms: no    Family Hx of migraines: none    Latest Imaging: none    Current HA Regimen:  Tylenol     PAST MEDICAL HISTORY:  Past Medical History:   Diagnosis Date    Asperger syndrome     Diabetes mellitus, type 2     "under control after weight loss"    Hyperlipidemia     Hypertension     "BEEN FINE IN RECENT YRS NEVER TOOK MEDS"    Obesity        PAST SURGICAL HISTORY:  Past Surgical " History:   Procedure Laterality Date    ESOPHAGOGASTRODUODENOSCOPY N/A 6/19/2018    Procedure: ESOPHAGOGASTRODUODENOSCOPY (EGD);  Surgeon: Darell Gautam Jr., MD;  Location: [facility];  Service: Endoscopy;  Laterality: N/A;       CURRENT MEDS:  Current Outpatient Medications   Medication Sig Dispense Refill    cholecalciferol, vitamin D3, (VITAMIN D3) 50 mcg (2,000 unit) Cap Take 1 capsule by mouth once daily.      EScitalopram oxalate (LEXAPRO) 20 MG tablet Take 1 tablet by mouth once daily 30 tablet 5    losartan (COZAAR) 25 MG tablet Take 1 tablet (25 mg total) by mouth once daily. 90 tablet 3    multivitamin capsule Take 1 capsule by mouth once daily.      omeprazole (PRILOSEC) 40 MG capsule Take 1 capsule (40 mg total) by mouth once daily. 30 capsule 2    rosuvastatin (CRESTOR) 20 MG tablet Take 1 tablet (20 mg total) by mouth once daily. 90 tablet 3    triamcinolone acetonide 0.1% (KENALOG) 0.1 % cream Apply topically 2 (two) times daily. for 10 days 30 g 1     No current facility-administered medications for this visit.       ALLERGIES:  Review of patient's allergies indicates:  No Known Allergies    FAMILY HISTORY:  Family History   Problem Relation Age of Onset    Hypertension Mother     Cataracts Mother     Hypertension Father     Cancer Maternal Aunt         gallbladder    Diabetes Maternal Grandfather     Hypertension Maternal Grandfather     Hypertension Paternal Grandfather     Heart disease Neg Hx     Glaucoma Neg Hx     Macular degeneration Neg Hx        SOCIAL HISTORY:  Social History     Tobacco Use    Smoking status: Never Smoker    Smokeless tobacco: Never Used   Substance Use Topics    Alcohol use: No    Drug use: No       Review of Systems:  Gen: no fever, no chills, no generalized feeling of weakness   HEENT: no double vision, no blurred vision, no eye pain, no eye exudates. no nasal congestion,   no traumatic injury of head, no neck pain, no neck stiffness. no  "photophobia or phonophobia at this time. ?    Heart: no chest pain, no SOB    Lungs: no SOB, no cough    MSK: no weakness of legs, intact ROM    ABD: no abd pain, no N/V/D/C, no difficulty with defecation .    Extremities: No leg pain, no edema.    Objective:  PHYSICAL EXAMINATION??   Seun Wynne is a 31 y.o. male patient who has the following vital signs with   Vitals:    04/12/22 1010   BP: 133/76   Pulse: 70   Weight: (!) 143.3 kg (315 lb 14.7 oz)   Height: 6' 4" (1.93 m)   , body surface area is 2.77 meters squared.     General:  Male in NAD, alert and awake, well groomed, avoids eye contact. ?    ? ?    HEENT: Head is NC/AT EOMI, pupil size: 4 mm B/L, no nystagmus noted; hearing grossly intact b/l.    Neck: Supple. no nuchal rigidity.      Cardiovascular: well perfused, no cyanosis        Respiratory: Symmetric chest rise noted       Musculoskeletal: Muscle tone noted to be adequate for patient age, muscle mass is WNL. No spontaneous movements or fasciculations noted during this examination.       Extremities: No pedal edema or calf tenderness.      Neurological Examination.    Mental status:  Very pleasant, quiet, affect slightly blunted; no aphasia noted during examination. Patient answers simple questions appropriately & follows simple commands; no expressive aphasia; no lyle-neglect or extinction. Vocabulary/word finding:  good.       Cranial Nerves: II-XII grossly intact.     Muscle Function: Tone WNL and Muscle bulk WNL.  Full and symmetric strength throughout     Sensory: ?light touch is grossly intact in bilateral upper and lower extremities, face, and trunk.       Reflexes:  1/4 throughout     Coordination: no dysmetria (finger to nose negative)     Gait: adequate casual gait with stride length and arm swing WNL. Tandem gait and walking on toes and heels are WNL     REVIEW   We reviewed their current medical history, medications, allergies, past medical history, surgical history, social " history, family history, and review of systems, and updated all the information.     ASSESSMENT AND PLAN   Rosalinda is a very pleasant, 31 y.o. with complicated diagnosis of     1. Chronic migraine without aura without status migrainosus, not intractable  Ambulatory referral/consult to Neurology    propranoloL (INDERAL) 20 MG tablet   2. Medication overuse headache     3. Autism spectrum disorder     4. Mild intellectual disability         Seun has worsening headaches that meet International Headache Society Criteria for chronic migraine present more than 15 days month.   Medication overuse is likely contributing to this patient's headaches.   We had a discussion regarding the need for once daily dosing for migraine medications given Seun's pattern of doing better with medication timing in this manner.  His mother also suspects that this need for routine contributed to his current issue with daily Tylenol use.    My approach to every patient is multimodal.   I focused our discussion on addressing modifiable risk factors and trying to decrease them.  After discussion with the patient, I recommend the followin. Preventive medications:  Failed:   None    START: Propranolol  Propranolol is a preventive medication for migraine prevention.   We recommend that you start propranolol 20 mg Qday. Further increase will be discussed at the next appointment. This can be titrated gradually up to 40 to 60 mg as tolerated.   Adverse events most commonly observed include low heart rate, low blood pressure and feeling dizzy. Take caution standing up quickly.   We recommend using propranolol as preventive therapy for at least 1 year if you have no significant side effects.    This medication is OK for pregnancy and breastfeeding.   We will prescribe the medication until you are on a stable dose, and than the expectation is for you to have the prescription managed by your primary care provider.      2. Acute (abortive)  "medications- these medications are to be taken only at the onset of severe headache and please limit a total of any combination of these medications to less than 6 days per month on average because they can cause rebound (medication overuse) headaches.   Failed:   none    CONTINUE: Tylenol at a reduced rate  Patient notes that this medication is helpful at relieving significant headache episodes.  We repeatedly cover the fact that this rate of usage must change in the immediate future for us to see results with preventive regimen.    Medication overuse    When patients who have frequent, disabling headaches take medications to relieve their symptoms, they run the risk that the attacks will increase in frequency to daily or near-daily as a rebound effect comes into play. This pattern, called medication overuse headache, is more likely to happen with certain medications in patients who are susceptible to having severe headaches.    Breaking the cycle involves:  1.  weaning Seun Wynne from the overused medications   2.  setting up a preventive regimen   3.  setting strict limits on the use of medications to relieve acute symptoms     King Santos, and Suzette Santos. "Breaking the cycle of medication overuse headache." [facility] Journal of Medicine 77.4 (2010): 236-242.     3. Natural approaches to increasing brain energy and serotonin:    SAMe 200 mg a day    Vitamin B2 400 mg daily    Vitamin B12 1000 mcg daily    Getting early morning light to increase natural serotonin, melatonin. Could also consider light therapy box, 10,000 LUX.     4. Headache prevention and acute treatment over-the counter supplements    Boswellia 800 mg 2-3 times per day, example brand Garcia's, natural anti inflammatory herb    Sleep? modulation- melatonin at night 5 to 10 mg 30 minutes prior to sleep    Feverfew Tea 1-3 cups per day. Can get from [email or web address] ([email or web address]) or [employer]- A Natural anti inflammatory " approach that does not cause further sensitization of the system.    Magnesium Citrate 250 mg daily, slowly increase up to 500-1000 mg daily. Side effect may include diarrhea, so we recommend stopping at whatever dose is comfortable for your body without causing this side effect. This supplement can be very helpful for preventing headache, preventing migraine aura, calming nerves, muscles and even mood. Recommend starting slowly, as it can also lead to increased bowel movements or diarrhea.?     5. DIET: I recommend a diet that heavily favors fruits and vegetables while reducing carbs. More information is available upon request.     6. EXERCISE: Gentle movement exercises, concentrate on combination of muscle building and aerobic. I also recommend adding gentle Yoga therapy. The goal is 150 minutes per week of moderate to rigorous exercise, but you should start at your own pace and progress slowly and safely as discussed today.    7. ANXIETY/STRESS- Control stressors with yoga, meditation, counseling, or other methods of mindfulness.     8. SLEEP- aim for 7-8 hrs of restful sleep per night.     9. Given the non localized description of headache pain, nonfocal exam, an approximate 2 year history of ongoing headaches, we will not initiate neuro imaging at this time.  If we fail to see improvement of headaches when medication overuse is addressed and preventive regimen is on board, we will pursue imaging at that time.    Benefits, side effects, and alternatives were discussed extensively with the patient.?     Seun verbally endorsed understanding of all the recommendations.?     he is going to follow up with me in 6 weeks?     I spent a total of 45 minutes on the day of the visit. This includes face to face time and non-face to face time preparing to see the patient (eg, review of tests), obtaining and/or reviewing separately obtained history, documenting clinical information in the electronic or other health record,  independently interpreting results and communicating results to the patient/family/caregiver, or care coordinator.    A dictation device was used to produce this documentation which can sometimes result in grammatical errors or the replacement of similar sounding words.    Tye Hampton, DO

## 2022-04-12 NOTE — PATIENT INSTRUCTIONS
1. Preventive medications; these medications have to be taken every single day to decrease headache frequency and severity; it takes at least minimum of few weeks to see any results.. The medication should be started at a small dose, and increased if no significant side effects. Patient compliance is key for effectiveness of the preventive medications. (we discussed the options of beta-blockers, calcium channel blockers, SSRIs, SNRIs, neuron modulating like topiramate options)   Failed:   None    START: Propranolol  Propranolol is a preventive medication for migraine prevention.   We recommend that you start propranolol 20 mg Qday. Further increase will be discussed at the next appointment. This can be titrated gradually up to 40 to 60 mg as tolerated.   Adverse events most commonly observed include low heart rate, low blood pressure and feeling dizzy. Take caution standing up quickly.   We recommend using propranolol as preventive therapy for at least 1 year if you have no significant side effects.    This medication is OK for pregnancy and breastfeeding.   We will prescribe the medication until you are on a stable dose, and than the expectation is for you to have the prescription managed by your primary care provider.        2. Acute (abortive) medications- these medications are to be taken only at the onset of severe headache and please limit a total of any combination of these medications to less than 6 days per month on average because they can cause rebound (medication overuse) headaches.   Failed:   none    CONTINUE: Tylenol at a reduced rate    Medication overuse    When patients who have frequent, disabling headaches take medications to relieve their symptoms, they run the risk that the attacks will increase in frequency to daily or near-daily as a rebound effect comes into play. This pattern, called medication overuse headache, is more likely to happen with certain medications in patients who are  "susceptible to having severe headaches.    Breaking the cycle involves:  1.  weaning Seun Wynne from the overused medications   2.  setting up a preventive regimen   3.  setting strict limits on the use of medications to relieve acute symptoms     King Santos, and Suzette Santos. "Breaking the cycle of medication overuse headache." [facility] Journal of Medicine 77.4 (2010): 236-242.     3. Natural approaches to increasing brain energy and serotonin:    SAMe 200 mg a day    Vitamin B2 400 mg daily    Vitamin B12 1000 mcg daily    Getting early morning light to increase natural serotonin, melatonin. Could also consider light therapy box, 10,000 LUX.     4. Headache prevention and acute treatment over-the counter supplements    Boswellia 800 mg 2-3 times per day, example brand Garcia's, natural anti inflammatory herb    Sleep? modulation- melatonin at night 5 to 10 mg 30 minutes prior to sleep    Feverfew Tea 1-3 cups per day. Can get from [employer] or [employer]- A Natural anti inflammatory approach that does not cause further sensitization of the system.    Magnesium Citrate 250 mg daily, slowly increase up to 500-1000 mg daily. Side effect may include diarrhea, so we recommend stopping at whatever dose is comfortable for your body without causing this side effect. This supplement can be very helpful for preventing headache, preventing migraine aura, calming nerves, muscles and even mood. Recommend starting slowly, as it can also lead to increased bowel movements or diarrhea.?     5. DIET: I recommend a diet that heavily favors fruits and vegetables while reducing carbs. More information is available upon request.     6. EXERCISE: Gentle movement exercises, concentrate on combination of muscle building and aerobic. I also recommend adding gentle Yoga therapy. The goal is 150 minutes per week of moderate to rigorous exercise, but you should start at your own pace and progress " slowly and safely as discussed today.    7. ANXIETY/STRESS- Control stressors with yoga, meditation, counseling, or other methods of mindfulness.     8. SLEEP- aim for 7-8 hrs of restful sleep per night.     9. FUN- Increase fun time spend with friends and family

## 2022-04-25 ENCOUNTER — OFFICE VISIT (OUTPATIENT)
Dept: INTERNAL MEDICINE | Facility: CLINIC | Age: 32
End: 2022-04-25
Payer: MEDICAID

## 2022-04-25 VITALS
DIASTOLIC BLOOD PRESSURE: 88 MMHG | HEART RATE: 56 BPM | WEIGHT: 315 LBS | SYSTOLIC BLOOD PRESSURE: 134 MMHG | HEIGHT: 76 IN | BODY MASS INDEX: 38.36 KG/M2 | TEMPERATURE: 97 F | OXYGEN SATURATION: 99 % | RESPIRATION RATE: 18 BRPM

## 2022-04-25 DIAGNOSIS — I10 ESSENTIAL HYPERTENSION: Primary | ICD-10-CM

## 2022-04-25 PROCEDURE — 3079F PR MOST RECENT DIASTOLIC BLOOD PRESSURE 80-89 MM HG: ICD-10-PCS | Mod: CPTII,,, | Performed by: INTERNAL MEDICINE

## 2022-04-25 PROCEDURE — 4010F ACE/ARB THERAPY RXD/TAKEN: CPT | Mod: CPTII,,, | Performed by: INTERNAL MEDICINE

## 2022-04-25 PROCEDURE — 99213 PR OFFICE/OUTPT VISIT, EST, LEVL III, 20-29 MIN: ICD-10-PCS | Mod: S$PBB,,, | Performed by: INTERNAL MEDICINE

## 2022-04-25 PROCEDURE — 99999 PR PBB SHADOW E&M-EST. PATIENT-LVL III: CPT | Mod: PBBFAC,,, | Performed by: INTERNAL MEDICINE

## 2022-04-25 PROCEDURE — 1160F PR REVIEW ALL MEDS BY PRESCRIBER/CLIN PHARMACIST DOCUMENTED: ICD-10-PCS | Mod: CPTII,,, | Performed by: INTERNAL MEDICINE

## 2022-04-25 PROCEDURE — 1159F MED LIST DOCD IN RCRD: CPT | Mod: CPTII,,, | Performed by: INTERNAL MEDICINE

## 2022-04-25 PROCEDURE — 3008F PR BODY MASS INDEX (BMI) DOCUMENTED: ICD-10-PCS | Mod: CPTII,,, | Performed by: INTERNAL MEDICINE

## 2022-04-25 PROCEDURE — 3079F DIAST BP 80-89 MM HG: CPT | Mod: CPTII,,, | Performed by: INTERNAL MEDICINE

## 2022-04-25 PROCEDURE — 99213 OFFICE O/P EST LOW 20 MIN: CPT | Mod: S$PBB,,, | Performed by: INTERNAL MEDICINE

## 2022-04-25 PROCEDURE — 99999 PR PBB SHADOW E&M-EST. PATIENT-LVL III: ICD-10-PCS | Mod: PBBFAC,,, | Performed by: INTERNAL MEDICINE

## 2022-04-25 PROCEDURE — 1160F RVW MEDS BY RX/DR IN RCRD: CPT | Mod: CPTII,,, | Performed by: INTERNAL MEDICINE

## 2022-04-25 PROCEDURE — 99213 OFFICE O/P EST LOW 20 MIN: CPT | Mod: PBBFAC,PO | Performed by: INTERNAL MEDICINE

## 2022-04-25 PROCEDURE — 1159F PR MEDICATION LIST DOCUMENTED IN MEDICAL RECORD: ICD-10-PCS | Mod: CPTII,,, | Performed by: INTERNAL MEDICINE

## 2022-04-25 PROCEDURE — 3008F BODY MASS INDEX DOCD: CPT | Mod: CPTII,,, | Performed by: INTERNAL MEDICINE

## 2022-04-25 PROCEDURE — 3075F PR MOST RECENT SYSTOLIC BLOOD PRESS GE 130-139MM HG: ICD-10-PCS | Mod: CPTII,,, | Performed by: INTERNAL MEDICINE

## 2022-04-25 PROCEDURE — 3075F SYST BP GE 130 - 139MM HG: CPT | Mod: CPTII,,, | Performed by: INTERNAL MEDICINE

## 2022-04-25 PROCEDURE — 4010F PR ACE/ARB THEARPY RXD/TAKEN: ICD-10-PCS | Mod: CPTII,,, | Performed by: INTERNAL MEDICINE

## 2022-04-25 NOTE — PROGRESS NOTES
Subjective:     Seun Wynne is a 31 y.o. male who presents for   Chief Complaint   Patient presents with    Hypertension     1mo, BP has been about the same as today, at home with wrist cuff       HPI     Hypertension: The patient has been taking medications as instructed, no medication side effects, no chest pain, no dyspnea and no swelling of ankles.    BP Readings from Last 3 Encounters:   04/25/22 134/88   04/12/22 133/76   01/24/22 (!) 140/98         Review of Systems   Unable to perform ROS: Other (mild MR, severe anxiety)   Respiratory: Negative for cough.    Cardiovascular: Negative for chest pain and leg swelling.   Gastrointestinal: Negative for abdominal pain.   Neurological: Negative for headaches.        He says that headaches are better          Objective:     Physical Exam  Constitutional:       Appearance: Normal appearance. He is well-developed and well-groomed. He is not ill-appearing.   HENT:      Head: Normocephalic and atraumatic.      Right Ear: Hearing and external ear normal.      Left Ear: Hearing and external ear normal.      Nose: Nose normal.   Eyes:      General: Lids are normal.      Conjunctiva/sclera:      Right eye: No hemorrhage.     Left eye: No hemorrhage.     Pupils: Pupils are equal, round, and reactive to light.   Cardiovascular:      Rate and Rhythm: Normal rate and regular rhythm.      Heart sounds: No murmur heard.  Pulmonary:      Effort: Pulmonary effort is normal. No respiratory distress.      Breath sounds: Normal breath sounds. No decreased breath sounds or wheezing.   Abdominal:      General: Bowel sounds are normal. There is no distension.   Musculoskeletal:         General: Normal range of motion.      Right lower leg: No edema.      Left lower leg: No edema.   Skin:     General: Skin is warm and dry.   Neurological:      Mental Status: He is alert. Mental status is at baseline.   Psychiatric:         Mood and Affect: Affect is blunt and flat.          Speech: Speech is delayed.         Behavior: Behavior is cooperative.          Assessment:      1. Essential hypertension           Plan:     1. Essential hypertension  - BP has improved, monitored at home by his mother  - continue losartan    May return in August 2022 for annual exam (earlier than usual) with his mother     __________________________    Karla Arce MD, PharmD  [facility]- [facility]  American Board of Obesity Medicine diplomate  Office [phone]

## 2022-05-23 ENCOUNTER — TELEPHONE (OUTPATIENT)
Dept: INTERNAL MEDICINE | Facility: CLINIC | Age: 32
End: 2022-05-23
Payer: MEDICAID

## 2022-05-23 DIAGNOSIS — I10 ESSENTIAL HYPERTENSION: Primary | ICD-10-CM

## 2022-05-23 DIAGNOSIS — E78.2 MIXED HYPERLIPIDEMIA: ICD-10-CM

## 2022-05-23 DIAGNOSIS — R73.03 PREDIABETES: ICD-10-CM

## 2022-05-23 DIAGNOSIS — D50.9 IRON DEFICIENCY ANEMIA, UNSPECIFIED IRON DEFICIENCY ANEMIA TYPE: ICD-10-CM

## 2022-05-23 NOTE — TELEPHONE ENCOUNTER
Labs pending for August lab appt with mother.    Orders pending    Insurance won't allow annual in Aug, will have to stay in December

## 2022-05-24 ENCOUNTER — OFFICE VISIT (OUTPATIENT)
Dept: NEUROLOGY | Facility: CLINIC | Age: 32
End: 2022-05-24
Payer: MEDICAID

## 2022-05-24 VITALS
BODY MASS INDEX: 38.36 KG/M2 | SYSTOLIC BLOOD PRESSURE: 129 MMHG | DIASTOLIC BLOOD PRESSURE: 90 MMHG | WEIGHT: 315 LBS | HEIGHT: 76 IN | HEART RATE: 59 BPM

## 2022-05-24 DIAGNOSIS — G44.40 MEDICATION OVERUSE HEADACHE: ICD-10-CM

## 2022-05-24 DIAGNOSIS — F70 MILD INTELLECTUAL DISABILITY: ICD-10-CM

## 2022-05-24 DIAGNOSIS — G43.709 CHRONIC MIGRAINE WITHOUT AURA WITHOUT STATUS MIGRAINOSUS, NOT INTRACTABLE: Primary | ICD-10-CM

## 2022-05-24 DIAGNOSIS — F84.0 AUTISM SPECTRUM DISORDER: ICD-10-CM

## 2022-05-24 PROCEDURE — 3080F PR MOST RECENT DIASTOLIC BLOOD PRESSURE >= 90 MM HG: ICD-10-PCS | Mod: CPTII,,, | Performed by: PSYCHIATRY & NEUROLOGY

## 2022-05-24 PROCEDURE — 4010F ACE/ARB THERAPY RXD/TAKEN: CPT | Mod: CPTII,,, | Performed by: PSYCHIATRY & NEUROLOGY

## 2022-05-24 PROCEDURE — 99999 PR PBB SHADOW E&M-EST. PATIENT-LVL III: CPT | Mod: PBBFAC,,, | Performed by: PSYCHIATRY & NEUROLOGY

## 2022-05-24 PROCEDURE — 99213 OFFICE O/P EST LOW 20 MIN: CPT | Mod: S$PBB,,, | Performed by: PSYCHIATRY & NEUROLOGY

## 2022-05-24 PROCEDURE — 1160F RVW MEDS BY RX/DR IN RCRD: CPT | Mod: CPTII,,, | Performed by: PSYCHIATRY & NEUROLOGY

## 2022-05-24 PROCEDURE — 3008F BODY MASS INDEX DOCD: CPT | Mod: CPTII,,, | Performed by: PSYCHIATRY & NEUROLOGY

## 2022-05-24 PROCEDURE — 99999 PR PBB SHADOW E&M-EST. PATIENT-LVL III: ICD-10-PCS | Mod: PBBFAC,,, | Performed by: PSYCHIATRY & NEUROLOGY

## 2022-05-24 PROCEDURE — 4010F PR ACE/ARB THEARPY RXD/TAKEN: ICD-10-PCS | Mod: CPTII,,, | Performed by: PSYCHIATRY & NEUROLOGY

## 2022-05-24 PROCEDURE — 3080F DIAST BP >= 90 MM HG: CPT | Mod: CPTII,,, | Performed by: PSYCHIATRY & NEUROLOGY

## 2022-05-24 PROCEDURE — 1159F PR MEDICATION LIST DOCUMENTED IN MEDICAL RECORD: ICD-10-PCS | Mod: CPTII,,, | Performed by: PSYCHIATRY & NEUROLOGY

## 2022-05-24 PROCEDURE — 1159F MED LIST DOCD IN RCRD: CPT | Mod: CPTII,,, | Performed by: PSYCHIATRY & NEUROLOGY

## 2022-05-24 PROCEDURE — 1160F PR REVIEW ALL MEDS BY PRESCRIBER/CLIN PHARMACIST DOCUMENTED: ICD-10-PCS | Mod: CPTII,,, | Performed by: PSYCHIATRY & NEUROLOGY

## 2022-05-24 PROCEDURE — 3074F PR MOST RECENT SYSTOLIC BLOOD PRESSURE < 130 MM HG: ICD-10-PCS | Mod: CPTII,,, | Performed by: PSYCHIATRY & NEUROLOGY

## 2022-05-24 PROCEDURE — 3074F SYST BP LT 130 MM HG: CPT | Mod: CPTII,,, | Performed by: PSYCHIATRY & NEUROLOGY

## 2022-05-24 PROCEDURE — 3008F PR BODY MASS INDEX (BMI) DOCUMENTED: ICD-10-PCS | Mod: CPTII,,, | Performed by: PSYCHIATRY & NEUROLOGY

## 2022-05-24 PROCEDURE — 99213 OFFICE O/P EST LOW 20 MIN: CPT | Mod: PBBFAC,PO | Performed by: PSYCHIATRY & NEUROLOGY

## 2022-05-24 PROCEDURE — 99213 PR OFFICE/OUTPT VISIT, EST, LEVL III, 20-29 MIN: ICD-10-PCS | Mod: S$PBB,,, | Performed by: PSYCHIATRY & NEUROLOGY

## 2022-05-24 RX ORDER — PROPRANOLOL HYDROCHLORIDE 20 MG/1
30 TABLET ORAL DAILY
Qty: 45 TABLET | Refills: 11 | Status: SHIPPED | OUTPATIENT
Start: 2022-05-24 | End: 2022-08-30

## 2022-05-24 NOTE — PROGRESS NOTES
NEUROLOGY HEADACHE NOTE?     CHIEF COMPLAINT?   Mr. Seun Wynne chief concern is follow-up for migraine and medication overuse headaches.     Name of the referring physician No ref. provider found     Name of primary care provider Karla Arce MD      Subjective:    05/24/22:  Patient is once again joined by his mother at the time of the encounter.  They jointly share that the patient has experienced fewer headache days in the last month.  After much discussion, they roughly estimated total headache days at 15-20 with approximately 5-7 severe episodes.  Tylenol consumption has certainly decreased and they estimate less than 15 days per month, possibly less than 10 days in the last month.  No side effects with propranolol regimen; currently 20 mg once daily.    Overall, no new complaints.  Headache stable over previous description.    =================================================  04/12/22 HPI:  Mr. Wynne presents today to discuss his ongoing uncontrolled headaches.  He is joined by his mother at the time today's encounter who provides much of the history. Seun was helpful in providing some specifics of the headache qualities but his mother was a valuable resource in collecting facts about over-the-counter medication use and other elements of history.  Clinical encounter consistent with the patient's hx.    Age of onset: 29  When did they become more of a problem: 30 YO  # of Total headache days per month: 20-25  # of Migraine days per month: 10+  Intensity of average and most severe headaches: 3-5/10, 7-8/10  Duration of headache pain: >4 hrs untreated  Quality of pain: Pulsating/Throbbing  Laterality: bilateral  Location: frontal   Photophobia and phonophobia: no  Nausea and vomiting: yes  Causes avoidance of physical activity: yes  Worsened by physical activity: yes  Preventive Medications failed: none  Acute medications failed: renu  OTC Medications: tylenol (30 days per month, over a year)  Hx  "of (Bolded items are positive): depression, anxiety, fibromyalgia, autoimmune disorder, head trauma, neurological infection, glaucoma, asthma, nephrolithiasis, MI, stroke  Is medication overuse contributing to the patient's current migraine burden: YES  Aura: no  Autonomic symptoms: no    Family Hx of migraines: none    Latest Imaging: none  =================================================  CURRENT MEDS:  Current Outpatient Medications   Medication Sig Dispense Refill    cholecalciferol, vitamin D3, (VITAMIN D3) 50 mcg (2,000 unit) Cap Take 1 capsule by mouth once daily.      EScitalopram oxalate (LEXAPRO) 20 MG tablet Take 1 tablet (20 mg total) by mouth once daily. 30 tablet 5    losartan (COZAAR) 25 MG tablet Take 1 tablet (25 mg total) by mouth once daily. 90 tablet 3    multivitamin capsule Take 1 capsule by mouth once daily.      pantoprazole (PROTONIX) 40 MG tablet Take 40 mg by mouth once daily.      propranoloL (INDERAL) 20 MG tablet Take 1 tablet (20 mg total) by mouth once daily. 30 tablet 11    rosuvastatin (CRESTOR) 20 MG tablet Take 1 tablet (20 mg total) by mouth once daily. 90 tablet 3     No current facility-administered medications for this visit.     ALLERGIES:  Review of patient's allergies indicates:  No Known Allergies    Objective:  PHYSICAL EXAMINATION??   Seun Wynne is a 31 y.o. male patient who has the following vital signs with   Vitals:    05/24/22 1152   BP: (!) 129/90   Pulse: (!) 59   Weight: (!) 144.7 kg (319 lb)   Height: 6' 4" (1.93 m)   , body surface area is 2.79 meters squared.     General:  Male in NAD, alert and awake, well groomed, avoids eye contact. ?    ? ?     Neurological Examination.    Mental status:  Very pleasant, quiet, affect slightly blunted; no aphasia      Cranial Nerves: II-XII grossly intact.     Muscle Function: Tone WNL and Muscle bulk WNL.  Full and symmetric strength throughout      Gait: adequate casual gait with stride length and arm " swing WN.     REVIEW   We reviewed their current medical history, medications, allergies, past medical history, surgical history, social history, family history, and review of systems, and updated all the information.     ASSESSMENT AND PLAN   Rosalinda is a very pleasant, 31 y.o. with complicated diagnosis of     1. Chronic migraine without aura without status migrainosus, not intractable  propranoloL (INDERAL) 20 MG tablet   2. Medication overuse headache     3. Autism spectrum disorder     4. Mild intellectual disability       Seun has improving headaches that previously met International Headache Society Criteria for chronic migraine present more than 15 days month.   Medication overuse is likely contributing to this patient's headaches, but this is improving as well.   We had a discussion regarding the need for once daily dosing for migraine medications given Seun's pattern of doing better with medication timing in this manner.  His mother also suspects that this need for routine contributed to his current issue with daily Tylenol use.    My approach to every patient is multimodal.   I focused our discussion on addressing modifiable risk factors and trying to decrease them.  After discussion with the patient, I recommend the followin. Preventive medications:  Failed:   None    INCREASE: Propranolol to 30mg daily  Propranolol is a preventive medication for migraine prevention.   Adverse events most commonly observed include low heart rate, low blood pressure and feeling dizzy. Take caution standing up quickly.   We recommend using propranolol as preventive therapy for at least 1 year if you have no significant side effects. We will prescribe the medication until you are on a stable dose, and than the expectation is for you to have the prescription managed by your primary care provider.      2. Acute (abortive) medications- these medications are to be taken only at the onset of severe headache and  please limit a total of any combination of these medications to less than 6 days per month on average because they can cause rebound (medication overuse) headaches.   Failed:   none    CONTINUE: Tylenol at a reduced rate  We repeatedly discussed the need for continued reduction of Tylenol use.  Goal less than 10 days per month.    3. Natural approaches to increasing brain energy and serotonin:    SAMe 200 mg a day    Vitamin B2 400 mg daily    Vitamin B12 1000 mcg daily    Getting early morning light to increase natural serotonin, melatonin. Could also consider light therapy box, 10,000 LUX.     4. Headache prevention and acute treatment over-the counter supplements    Boswellia 800 mg 2-3 times per day, example brand Jose's, natural anti inflammatory herb    Sleep? modulation- melatonin at night 5 to 10 mg 30 minutes prior to sleep    Feverfew Tea 1-3 cups per day. Can get from [employer] or [email or web address]- A Natural anti inflammatory approach that does not cause further sensitization of the system.    Magnesium Citrate 250 mg daily, slowly increase up to 500-1000 mg daily. Side effect may include diarrhea, so we recommend stopping at whatever dose is comfortable for your body without causing this side effect. This supplement can be very helpful for preventing headache, preventing migraine aura, calming nerves, muscles and even mood. Recommend starting slowly, as it can also lead to increased bowel movements or diarrhea.?     5. DIET: I recommend a diet that heavily favors fruits and vegetables while reducing carbs. More information is available upon request.     6. EXERCISE: Gentle movement exercises, concentrate on combination of muscle building and aerobic. I also recommend adding gentle Yoga therapy. The goal is 150 minutes per week of moderate to rigorous exercise, but you should start at your own pace and progress slowly and safely as discussed today.    7. ANXIETY/STRESS- Control stressors  with yoga, meditation, counseling, or other methods of mindfulness.     8. SLEEP- aim for 7-8 hrs of restful sleep per night.     9. As we continue to see improvement in headache frequency as medication overuse is corrected, no neuro imaging will be initiated at this time.  We will continue to revisit this decision at each encounter given all case specifics.    Benefits, side effects, and alternatives were discussed extensively with the patient and his mother.?     Seun verbally endorsed understanding of all the recommendations.?     he is going to follow up with me in 3 months or sooner as needed.  Patient and mother were encouraged to reach out via chart message or phone if questions or concerns arise.      I spent a total of 25 minutes on the day of the visit. This includes face to face time and non-face to face time preparing to see the patient (eg, review of tests), obtaining and/or reviewing separately obtained history, documenting clinical information in the electronic or other health record, independently interpreting results and communicating results to the patient/family/caregiver, or care coordinator.    A dictation device was used to produce this documentation which can sometimes result in grammatical errors or the replacement of similar sounding words.    Tye Hampton, DO

## 2022-06-07 ENCOUNTER — PATIENT MESSAGE (OUTPATIENT)
Dept: NEUROLOGY | Facility: CLINIC | Age: 32
End: 2022-06-07
Payer: MEDICAID

## 2022-06-07 ENCOUNTER — PATIENT MESSAGE (OUTPATIENT)
Dept: INTERNAL MEDICINE | Facility: CLINIC | Age: 32
End: 2022-06-07
Payer: MEDICAID

## 2022-08-08 ENCOUNTER — LAB VISIT (OUTPATIENT)
Dept: LAB | Facility: HOSPITAL | Age: 32
End: 2022-08-08
Attending: INTERNAL MEDICINE
Payer: MEDICAID

## 2022-08-08 DIAGNOSIS — D50.9 IRON DEFICIENCY ANEMIA, UNSPECIFIED IRON DEFICIENCY ANEMIA TYPE: ICD-10-CM

## 2022-08-08 DIAGNOSIS — E78.2 MIXED HYPERLIPIDEMIA: ICD-10-CM

## 2022-08-08 DIAGNOSIS — R73.03 PREDIABETES: ICD-10-CM

## 2022-08-08 DIAGNOSIS — I10 ESSENTIAL HYPERTENSION: ICD-10-CM

## 2022-08-08 LAB
ALBUMIN SERPL BCP-MCNC: 4.4 G/DL (ref 3.5–5.2)
ALP SERPL-CCNC: 46 U/L (ref 55–135)
ALT SERPL W/O P-5'-P-CCNC: 68 U/L (ref 10–44)
ANION GAP SERPL CALC-SCNC: 9 MMOL/L (ref 8–16)
AST SERPL-CCNC: 45 U/L (ref 10–40)
BASOPHILS # BLD AUTO: 0.05 K/UL (ref 0–0.2)
BASOPHILS NFR BLD: 0.8 % (ref 0–1.9)
BILIRUB SERPL-MCNC: 0.6 MG/DL (ref 0.1–1)
BUN SERPL-MCNC: 10 MG/DL (ref 6–20)
CALCIUM SERPL-MCNC: 10.1 MG/DL (ref 8.7–10.5)
CHLORIDE SERPL-SCNC: 103 MMOL/L (ref 95–110)
CHOLEST SERPL-MCNC: 206 MG/DL (ref 120–199)
CHOLEST/HDLC SERPL: 4.7 {RATIO} (ref 2–5)
CO2 SERPL-SCNC: 27 MMOL/L (ref 23–29)
CREAT SERPL-MCNC: 0.9 MG/DL (ref 0.5–1.4)
DIFFERENTIAL METHOD: ABNORMAL
EOSINOPHIL # BLD AUTO: 0.2 K/UL (ref 0–0.5)
EOSINOPHIL NFR BLD: 3.4 % (ref 0–8)
ERYTHROCYTE [DISTWIDTH] IN BLOOD BY AUTOMATED COUNT: 19.8 % (ref 11.5–14.5)
EST. GFR  (NO RACE VARIABLE): >60 ML/MIN/1.73 M^2
ESTIMATED AVG GLUCOSE: 134 MG/DL (ref 68–131)
GLUCOSE SERPL-MCNC: 96 MG/DL (ref 70–110)
HBA1C MFR BLD: 6.3 % (ref 4–5.6)
HCT VFR BLD AUTO: 36.9 % (ref 40–54)
HDLC SERPL-MCNC: 44 MG/DL (ref 40–75)
HDLC SERPL: 21.4 % (ref 20–50)
HGB BLD-MCNC: 11.2 G/DL (ref 14–18)
IMM GRANULOCYTES # BLD AUTO: 0.07 K/UL (ref 0–0.04)
IMM GRANULOCYTES NFR BLD AUTO: 1.2 % (ref 0–0.5)
LDLC SERPL CALC-MCNC: 124 MG/DL (ref 63–159)
LYMPHOCYTES # BLD AUTO: 2.1 K/UL (ref 1–4.8)
LYMPHOCYTES NFR BLD: 36.1 % (ref 18–48)
MCH RBC QN AUTO: 21.8 PG (ref 27–31)
MCHC RBC AUTO-ENTMCNC: 30.4 G/DL (ref 32–36)
MCV RBC AUTO: 72 FL (ref 82–98)
MONOCYTES # BLD AUTO: 0.5 K/UL (ref 0.3–1)
MONOCYTES NFR BLD: 8.1 % (ref 4–15)
NEUTROPHILS # BLD AUTO: 3 K/UL (ref 1.8–7.7)
NEUTROPHILS NFR BLD: 50.4 % (ref 38–73)
NONHDLC SERPL-MCNC: 162 MG/DL
NRBC BLD-RTO: 0 /100 WBC
PLATELET # BLD AUTO: 282 K/UL (ref 150–450)
PMV BLD AUTO: 10.1 FL (ref 9.2–12.9)
POTASSIUM SERPL-SCNC: 4.2 MMOL/L (ref 3.5–5.1)
PROT SERPL-MCNC: 8 G/DL (ref 6–8.4)
RBC # BLD AUTO: 5.14 M/UL (ref 4.6–6.2)
SODIUM SERPL-SCNC: 139 MMOL/L (ref 136–145)
TRIGL SERPL-MCNC: 190 MG/DL (ref 30–150)
WBC # BLD AUTO: 5.93 K/UL (ref 3.9–12.7)

## 2022-08-08 PROCEDURE — 85025 COMPLETE CBC W/AUTO DIFF WBC: CPT | Performed by: INTERNAL MEDICINE

## 2022-08-08 PROCEDURE — 36415 COLL VENOUS BLD VENIPUNCTURE: CPT | Performed by: INTERNAL MEDICINE

## 2022-08-08 PROCEDURE — 80053 COMPREHEN METABOLIC PANEL: CPT | Performed by: INTERNAL MEDICINE

## 2022-08-08 PROCEDURE — 83036 HEMOGLOBIN GLYCOSYLATED A1C: CPT | Performed by: INTERNAL MEDICINE

## 2022-08-08 PROCEDURE — 80061 LIPID PANEL: CPT | Performed by: INTERNAL MEDICINE

## 2022-08-15 ENCOUNTER — OFFICE VISIT (OUTPATIENT)
Dept: INTERNAL MEDICINE | Facility: CLINIC | Age: 32
End: 2022-08-15
Payer: MEDICAID

## 2022-08-15 VITALS
BODY MASS INDEX: 38.36 KG/M2 | DIASTOLIC BLOOD PRESSURE: 80 MMHG | HEART RATE: 65 BPM | WEIGHT: 315 LBS | OXYGEN SATURATION: 98 % | HEIGHT: 76 IN | RESPIRATION RATE: 20 BRPM | SYSTOLIC BLOOD PRESSURE: 116 MMHG | TEMPERATURE: 98 F

## 2022-08-15 DIAGNOSIS — I10 ESSENTIAL HYPERTENSION: Primary | ICD-10-CM

## 2022-08-15 DIAGNOSIS — F84.9 PERVASIVE DEVELOPMENTAL DISORDER: ICD-10-CM

## 2022-08-15 DIAGNOSIS — R73.03 PREDIABETES: ICD-10-CM

## 2022-08-15 DIAGNOSIS — R12 HEARTBURN: ICD-10-CM

## 2022-08-15 DIAGNOSIS — Z00.00 ANNUAL PHYSICAL EXAM: ICD-10-CM

## 2022-08-15 DIAGNOSIS — F40.10 SOCIAL ANXIETY DISORDER: ICD-10-CM

## 2022-08-15 DIAGNOSIS — R74.8 ABNORMAL AST AND ALT: ICD-10-CM

## 2022-08-15 DIAGNOSIS — G43.709 CHRONIC MIGRAINE WITHOUT AURA WITHOUT STATUS MIGRAINOSUS, NOT INTRACTABLE: ICD-10-CM

## 2022-08-15 DIAGNOSIS — E78.2 MIXED HYPERLIPIDEMIA: ICD-10-CM

## 2022-08-15 PROCEDURE — 3074F PR MOST RECENT SYSTOLIC BLOOD PRESSURE < 130 MM HG: ICD-10-PCS | Mod: CPTII,,, | Performed by: INTERNAL MEDICINE

## 2022-08-15 PROCEDURE — 4010F ACE/ARB THERAPY RXD/TAKEN: CPT | Mod: CPTII,,, | Performed by: INTERNAL MEDICINE

## 2022-08-15 PROCEDURE — 3079F DIAST BP 80-89 MM HG: CPT | Mod: CPTII,,, | Performed by: INTERNAL MEDICINE

## 2022-08-15 PROCEDURE — 3008F PR BODY MASS INDEX (BMI) DOCUMENTED: ICD-10-PCS | Mod: CPTII,,, | Performed by: INTERNAL MEDICINE

## 2022-08-15 PROCEDURE — 99214 PR OFFICE/OUTPT VISIT, EST, LEVL IV, 30-39 MIN: ICD-10-PCS | Mod: S$PBB,,, | Performed by: INTERNAL MEDICINE

## 2022-08-15 PROCEDURE — 3074F SYST BP LT 130 MM HG: CPT | Mod: CPTII,,, | Performed by: INTERNAL MEDICINE

## 2022-08-15 PROCEDURE — 1159F PR MEDICATION LIST DOCUMENTED IN MEDICAL RECORD: ICD-10-PCS | Mod: CPTII,,, | Performed by: INTERNAL MEDICINE

## 2022-08-15 PROCEDURE — 3044F PR MOST RECENT HEMOGLOBIN A1C LEVEL <7.0%: ICD-10-PCS | Mod: CPTII,,, | Performed by: INTERNAL MEDICINE

## 2022-08-15 PROCEDURE — 99999 PR PBB SHADOW E&M-EST. PATIENT-LVL IV: CPT | Mod: PBBFAC,,, | Performed by: INTERNAL MEDICINE

## 2022-08-15 PROCEDURE — 99214 OFFICE O/P EST MOD 30 MIN: CPT | Mod: S$PBB,,, | Performed by: INTERNAL MEDICINE

## 2022-08-15 PROCEDURE — 99999 PR PBB SHADOW E&M-EST. PATIENT-LVL IV: ICD-10-PCS | Mod: PBBFAC,,, | Performed by: INTERNAL MEDICINE

## 2022-08-15 PROCEDURE — 1160F PR REVIEW ALL MEDS BY PRESCRIBER/CLIN PHARMACIST DOCUMENTED: ICD-10-PCS | Mod: CPTII,,, | Performed by: INTERNAL MEDICINE

## 2022-08-15 PROCEDURE — 3008F BODY MASS INDEX DOCD: CPT | Mod: CPTII,,, | Performed by: INTERNAL MEDICINE

## 2022-08-15 PROCEDURE — 4010F PR ACE/ARB THEARPY RXD/TAKEN: ICD-10-PCS | Mod: CPTII,,, | Performed by: INTERNAL MEDICINE

## 2022-08-15 PROCEDURE — 1160F RVW MEDS BY RX/DR IN RCRD: CPT | Mod: CPTII,,, | Performed by: INTERNAL MEDICINE

## 2022-08-15 PROCEDURE — 3079F PR MOST RECENT DIASTOLIC BLOOD PRESSURE 80-89 MM HG: ICD-10-PCS | Mod: CPTII,,, | Performed by: INTERNAL MEDICINE

## 2022-08-15 PROCEDURE — 3044F HG A1C LEVEL LT 7.0%: CPT | Mod: CPTII,,, | Performed by: INTERNAL MEDICINE

## 2022-08-15 PROCEDURE — 1159F MED LIST DOCD IN RCRD: CPT | Mod: CPTII,,, | Performed by: INTERNAL MEDICINE

## 2022-08-15 PROCEDURE — 99214 OFFICE O/P EST MOD 30 MIN: CPT | Mod: PBBFAC,PO | Performed by: INTERNAL MEDICINE

## 2022-08-15 RX ORDER — ROSUVASTATIN CALCIUM 40 MG/1
40 TABLET, COATED ORAL DAILY
Qty: 90 TABLET | Refills: 3 | Status: SHIPPED | OUTPATIENT
Start: 2022-08-15 | End: 2023-08-17

## 2022-08-15 RX ORDER — PANTOPRAZOLE SODIUM 40 MG/1
40 TABLET, DELAYED RELEASE ORAL DAILY
Qty: 90 TABLET | Refills: 2 | Status: SHIPPED | OUTPATIENT
Start: 2022-08-15 | End: 2023-03-12 | Stop reason: SDUPTHER

## 2022-08-15 NOTE — PROGRESS NOTES
"    Subjective:     Seun Wynne is a 31 y.o. male who presents for an annual exam.    PCP: Karla Arce MD    Hypertension: The patient has been taking medications as instructed, no medication side effects noted and no swelling of ankles.    BP Readings from Last 3 Encounters:   08/15/22 116/80   05/24/22 (!) 129/90   04/25/22 134/88       Hyperlipidemia: Compliance with treatment has been good. The patient exercises never. Patient denies muscle pain associated with his medications. Previous history of cardiac disease includes: none.    Lab Results   Component Value Date    CHOL 206 (H) 08/08/2022    HDL 44 08/08/2022        Pre-diabetes: Patient has a family history of diabetes. The patient has not make any changes to diet.  He has not taken medications to reduce the risk for progression to diabetes. Drinks Adrian Aid with sugar regularly.    Lab Results   Component Value Date    HGBA1C 6.3 (H) 08/08/2022    GLU 96 08/08/2022       Medical History:   Past Medical History:   Diagnosis Date    Asperger syndrome     Diabetes mellitus, type 2     "under control after weight loss"    Hyperlipidemia     Hypertension     "BEEN FINE IN RECENT YRS NEVER TOOK MEDS"    Obesity      Family History: family history includes Cancer in his maternal aunt; Cataracts in his mother; Diabetes in his maternal grandfather; Hypertension in his father, maternal grandfather, mother, and paternal grandfather.    Surgical History:   Past Surgical History:   Procedure Laterality Date    ESOPHAGOGASTRODUODENOSCOPY N/A 6/19/2018    Procedure: ESOPHAGOGASTRODUODENOSCOPY (EGD);  Surgeon: Darell Gautam Jr., MD;  Location: [facility];  Service: Endoscopy;  Laterality: N/A;      Social History:  reports that he has never smoked. He has never used smokeless tobacco. He reports that he does not drink alcohol and does not use drugs.     Allergies: Review of patient's allergies indicates:  No Known Allergies     Medications: "   Current Outpatient Medications:     cholecalciferol, vitamin D3, (VITAMIN D3) 50 mcg (2,000 unit) Cap, Take 1 capsule by mouth once daily., Disp: , Rfl:     EScitalopram oxalate (LEXAPRO) 20 MG tablet, Take 1 tablet (20 mg total) by mouth once daily., Disp: 30 tablet, Rfl: 5    losartan (COZAAR) 25 MG tablet, Take 1 tablet (25 mg total) by mouth once daily., Disp: 90 tablet, Rfl: 3    multivitamin capsule, Take 1 capsule by mouth once daily., Disp: , Rfl:     propranoloL (INDERAL) 20 MG tablet, Take 1.5 tablets (30 mg total) by mouth once daily., Disp: 45 tablet, Rfl: 11    pantoprazole (PROTONIX) 40 MG tablet, Take 1 tablet (40 mg total) by mouth once daily., Disp: 90 tablet, Rfl: 2    rosuvastatin (CRESTOR) 40 MG Tab, Take 1 tablet (40 mg total) by mouth once daily., Disp: 90 tablet, Rfl: 3    Health Maintenance:   Health Maintenance Topics with due status: Not Due       Topic Last Completion Date    Influenza Vaccine 12/22/2021    TETANUS VACCINE 01/24/2022     Lab Results   Component Value Date    HEPCAB Negative 03/05/2020      Eye Exam: due   Dental Exam: due   Hepatitis C screening: 3/2020, neg    Vaccinations:   Immunization History   Administered Date(s) Administered    COVID-19, MRNA, LN-S, PF (MODERNA FULL 0.5 ML DOSE) 03/18/2021, 04/17/2021, 12/03/2021    Influenza - Quadrivalent - PF *Preferred* (6 months and older) 11/11/2020, 12/22/2021    Td (ADULT) 01/24/2022    Td - PF (ADULT) 01/24/2022    Tdap 10/28/2010       Tetanus: 1/2022   Covid vaccine: boosted x1      Body mass index is 38.72 kg/m².  Wt Readings from Last 3 Encounters:   08/15/22 (!) 144.3 kg (318 lb 2 oz)   05/24/22 (!) 144.7 kg (319 lb)   04/25/22 (!) 145.1 kg (319 lb 14.2 oz)       Review of Systems   Unable to perform ROS: Other (developmental disorder, speaks very little)   Respiratory: Negative for cough.    Cardiovascular: Negative for chest pain.   Neurological: Positive for headaches (mother reports that he uses  tylenol a lot less since he started the migraine prevention medication).          Objective:     Physical Exam  Vitals reviewed.   Constitutional:       General: He is awake. He is not in acute distress.     Appearance: Normal appearance. He is well-developed and well-groomed. He is not ill-appearing.   HENT:      Head: Normocephalic and atraumatic.      Right Ear: Hearing, tympanic membrane, ear canal and external ear normal. Tympanic membrane is not erythematous or bulging.      Left Ear: Hearing, tympanic membrane, ear canal and external ear normal. Tympanic membrane is not erythematous or bulging.      Nose: Nose normal. No congestion.      Mouth/Throat:      Mouth: Mucous membranes are moist.      Tongue: No lesions.      Pharynx: Oropharynx is clear. Uvula midline. No oropharyngeal exudate or posterior oropharyngeal erythema.   Eyes:      General: Lids are normal. Vision grossly intact. Gaze aligned appropriately. No scleral icterus.     Conjunctiva/sclera: Conjunctivae normal.      Right eye: Right conjunctiva is not injected.      Left eye: Left conjunctiva is not injected.      Pupils: Pupils are equal, round, and reactive to light.   Neck:      Thyroid: No thyroid mass or thyromegaly.   Cardiovascular:      Rate and Rhythm: Normal rate and regular rhythm.      Pulses: Normal pulses.      Heart sounds: Normal heart sounds. No murmur heard.  Pulmonary:      Effort: Pulmonary effort is normal. No respiratory distress.      Breath sounds: Normal breath sounds. No decreased breath sounds or wheezing.   Chest:   Breasts:      Right: No supraclavicular adenopathy.      Left: No supraclavicular adenopathy.       Abdominal:      General: Bowel sounds are normal. There is no distension.      Palpations: Abdomen is soft.      Tenderness: There is no abdominal tenderness. There is no guarding or rebound.   Musculoskeletal:         General: Normal range of motion.      Cervical back: Normal range of motion and neck  supple.      Right lower leg: No edema.      Left lower leg: No edema.   Lymphadenopathy:      Cervical: No cervical adenopathy.      Upper Body:      Right upper body: No supraclavicular adenopathy.      Left upper body: No supraclavicular adenopathy.   Skin:     General: Skin is warm and dry.      Coloration: Skin is not cyanotic.      Findings: No lesion or rash.      Nails: There is no clubbing.   Neurological:      General: No focal deficit present.      Mental Status: He is alert and oriented to person, place, and time.      Coordination: Coordination is intact.      Gait: Gait is intact.      Deep Tendon Reflexes: Reflexes are normal and symmetric. Reflexes normal.   Psychiatric:         Mood and Affect: Mood normal.         Speech: Speech is delayed.         Behavior: Behavior is slowed. Behavior is cooperative.         Cognition and Memory: Cognition normal.            Assessment:        1. Essential hypertension    2. Chronic migraine without aura without status migrainosus, not intractable    3. Mixed hyperlipidemia    4. Heartburn    5. Prediabetes    6. Social anxiety disorder    7. Pervasive developmental disorder    8. Abnormal AST and ALT    9. Annual physical exam           Plan:     1. Essential hypertension   - BP is controlled, continue losartan    2. Chronic migraine without aura without status migrainosus, not intractable  - improvement after starting propranolol for prophylaxis, monitor closely    3. Mixed hyperlipidemia  - increase Crestor to 40mg daily, repeat labs in 3 months   - rosuvastatin (CRESTOR) 40 MG Tab; Take 1 tablet (40 mg total) by mouth once daily.  Dispense: 90 tablet; Refill: 3    4. Heartburn  - pantoprazole (PROTONIX) 40 MG tablet; Take 1 tablet (40 mg total) by mouth once daily.  Dispense: 90 tablet; Refill: 2    5. Prediabetes  - reduce sugar intake, increase activity level    6.  Social anxiety disorder  - stable, takes Lexapro, f/u with Psychiatry as needed    7.  Pervasive developmental disorder  - stable, evaluated by developmental specialists, continue to monitor    8. Abnormal AST and ALT  - Ambulatory referral/consult to Hepatology; Future    9. Annual physical exam  - reviewed recent labs with patient and his mother    RTC in 6 months for follow-up  or sooner if needed            4. Prediabetes    5. Social anxiety disorder      9.       __________________________    Karla Arce MD, PharmD  [facility]  American Board of Obesity Medicine diplomate  Office [phone]

## 2022-08-18 ENCOUNTER — TELEPHONE (OUTPATIENT)
Dept: INTERNAL MEDICINE | Facility: CLINIC | Age: 32
End: 2022-08-18
Payer: MEDICAID

## 2022-08-18 NOTE — TELEPHONE ENCOUNTER
----- Message from Karla Arce MD sent at 8/15/2022  6:06 PM CDT -----  >>>>>>>>>>>> 6 mo f/u htn  >>>> PLease arrange lipid/CMP in 3 months

## 2022-08-22 ENCOUNTER — TELEPHONE (OUTPATIENT)
Dept: NEUROLOGY | Facility: CLINIC | Age: 32
End: 2022-08-22
Payer: MEDICAID

## 2022-08-24 ENCOUNTER — OFFICE VISIT (OUTPATIENT)
Dept: NEUROLOGY | Facility: CLINIC | Age: 32
End: 2022-08-24
Payer: MEDICAID

## 2022-08-24 VITALS
HEIGHT: 76 IN | SYSTOLIC BLOOD PRESSURE: 124 MMHG | BODY MASS INDEX: 38.36 KG/M2 | DIASTOLIC BLOOD PRESSURE: 88 MMHG | HEART RATE: 65 BPM | WEIGHT: 315 LBS

## 2022-08-24 DIAGNOSIS — G44.40 MEDICATION OVERUSE HEADACHE: ICD-10-CM

## 2022-08-24 DIAGNOSIS — G43.709 CHRONIC MIGRAINE WITHOUT AURA WITHOUT STATUS MIGRAINOSUS, NOT INTRACTABLE: Primary | ICD-10-CM

## 2022-08-24 PROCEDURE — 99212 PR OFFICE/OUTPT VISIT, EST, LEVL II, 10-19 MIN: ICD-10-PCS | Mod: FS,S$PBB,,

## 2022-08-24 PROCEDURE — 3008F BODY MASS INDEX DOCD: CPT | Mod: CPTII,,,

## 2022-08-24 PROCEDURE — 3074F SYST BP LT 130 MM HG: CPT | Mod: CPTII,,,

## 2022-08-24 PROCEDURE — 99212 OFFICE O/P EST SF 10 MIN: CPT | Mod: FS,S$PBB,,

## 2022-08-24 PROCEDURE — 4010F PR ACE/ARB THEARPY RXD/TAKEN: ICD-10-PCS | Mod: CPTII,,,

## 2022-08-24 PROCEDURE — 4010F ACE/ARB THERAPY RXD/TAKEN: CPT | Mod: CPTII,,,

## 2022-08-24 PROCEDURE — 1160F RVW MEDS BY RX/DR IN RCRD: CPT | Mod: CPTII,,,

## 2022-08-24 PROCEDURE — 3008F PR BODY MASS INDEX (BMI) DOCUMENTED: ICD-10-PCS | Mod: CPTII,,,

## 2022-08-24 PROCEDURE — 3079F DIAST BP 80-89 MM HG: CPT | Mod: CPTII,,,

## 2022-08-24 PROCEDURE — 99999 PR PBB SHADOW E&M-EST. PATIENT-LVL III: ICD-10-PCS | Mod: PBBFAC,,,

## 2022-08-24 PROCEDURE — 99213 OFFICE O/P EST LOW 20 MIN: CPT | Mod: PBBFAC,PO

## 2022-08-24 PROCEDURE — 1159F MED LIST DOCD IN RCRD: CPT | Mod: CPTII,,,

## 2022-08-24 PROCEDURE — 1159F PR MEDICATION LIST DOCUMENTED IN MEDICAL RECORD: ICD-10-PCS | Mod: CPTII,,,

## 2022-08-24 PROCEDURE — 3074F PR MOST RECENT SYSTOLIC BLOOD PRESSURE < 130 MM HG: ICD-10-PCS | Mod: CPTII,,,

## 2022-08-24 PROCEDURE — 3044F PR MOST RECENT HEMOGLOBIN A1C LEVEL <7.0%: ICD-10-PCS | Mod: CPTII,,,

## 2022-08-24 PROCEDURE — 99999 PR PBB SHADOW E&M-EST. PATIENT-LVL III: CPT | Mod: PBBFAC,,,

## 2022-08-24 PROCEDURE — 1160F PR REVIEW ALL MEDS BY PRESCRIBER/CLIN PHARMACIST DOCUMENTED: ICD-10-PCS | Mod: CPTII,,,

## 2022-08-24 PROCEDURE — 3044F HG A1C LEVEL LT 7.0%: CPT | Mod: CPTII,,,

## 2022-08-24 PROCEDURE — 3079F PR MOST RECENT DIASTOLIC BLOOD PRESSURE 80-89 MM HG: ICD-10-PCS | Mod: CPTII,,,

## 2022-08-24 NOTE — PROGRESS NOTES
[facility] NEUROLOGY  [facility], [place]    Date: 8/24/22  Patient Name: Seun Wynne   MRN: [id number]   PCP: Karla Arce  Referring Provider: No ref. provider found    Chief Complaint: Follow up headache  Subjective:      HPI:   Mr. Seun Wynne is a 31 y.o. male presenting for follow up headaches. He reports an estimated 1/30 headache days in the last month. Severity of this headache was rated as 8/10 and was relieved with tylenol. Patient continues on propranolol 30 mg daily, without any side effects. Patient's mother holds onto the tylenol so that it is utilized only when needed and to prevent medication overuse headaches.    Overall no new complaints.    =================================================  Per documentation by Dr. Tye Hampton Do on 5/24/22:  05/24/22:  Patient is once again joined by his mother at the time of the encounter.  They jointly share that the patient has experienced fewer headache days in the last month.  After much discussion, they roughly estimated total headache days at 15-20 with approximately 5-7 severe episodes.  Tylenol consumption has certainly decreased and they estimate less than 15 days per month, possibly less than 10 days in the last month.  No side effects with propranolol regimen; currently 20 mg once daily.    Overall, no new complaints.  Headache stable over previous description.    =================================================  04/12/22 HPI:  Mr. Wynne presents today to discuss his ongoing uncontrolled headaches.  He is joined by his mother at the time today's encounter who provides much of the history. Seun was helpful in providing some specifics of the headache qualities but his mother was a valuable resource in collecting facts about over-the-counter medication use and other elements of history.  Clinical encounter consistent with the patient's hx.    Age of onset: 29  When did they become more of a problem: 29  "YO  # of Total headache days per month: 20-25  # of Migraine days per month: 10+  Intensity of average and most severe headaches: 3-5/10, 7-8/10  Duration of headache pain: >4 hrs untreated  Quality of pain: Pulsating/Throbbing  Laterality: bilateral  Location: frontal   Photophobia and phonophobia: no  Nausea and vomiting: yes  Causes avoidance of physical activity: yes  Worsened by physical activity: yes  Preventive Medications failed: none  Acute medications failed: renu  OTC Medications: tylenol (30 days per month, over a year)  Hx of (Bolded items are positive): depression, anxiety, fibromyalgia, autoimmune disorder, head trauma, neurological infection, glaucoma, asthma, nephrolithiasis, MI, stroke  Is medication overuse contributing to the patient's current migraine burden: YES  Aura: no  Autonomic symptoms: no    Family Hx of migraines: none    Latest Imaging: none      PAST MEDICAL HISTORY:  Past Medical History:   Diagnosis Date    Asperger syndrome     Diabetes mellitus, type 2     "under control after weight loss"    Hyperlipidemia     Hypertension     "BEEN FINE IN RECENT YRS NEVER TOOK MEDS"    Obesity        PAST SURGICAL HISTORY:  Past Surgical History:   Procedure Laterality Date    ESOPHAGOGASTRODUODENOSCOPY N/A 6/19/2018    Procedure: ESOPHAGOGASTRODUODENOSCOPY (EGD);  Surgeon: Darell Gautam Jr., MD;  Location: [facility];  Service: Endoscopy;  Laterality: N/A;       CURRENT MEDS:  Current Outpatient Medications   Medication Sig Dispense Refill    cholecalciferol, vitamin D3, (VITAMIN D3) 50 mcg (2,000 unit) Cap Take 1 capsule by mouth once daily.      EScitalopram oxalate (LEXAPRO) 20 MG tablet Take 1 tablet (20 mg total) by mouth once daily. 30 tablet 5    losartan (COZAAR) 25 MG tablet Take 1 tablet (25 mg total) by mouth once daily. 90 tablet 3    multivitamin capsule Take 1 capsule by mouth once daily.      pantoprazole (PROTONIX) 40 MG tablet Take 1 tablet (40 mg total) by mouth " once daily. 90 tablet 2    propranoloL (INDERAL) 20 MG tablet Take 1.5 tablets (30 mg total) by mouth once daily. 45 tablet 11    rosuvastatin (CRESTOR) 40 MG Tab Take 1 tablet (40 mg total) by mouth once daily. 90 tablet 3     No current facility-administered medications for this visit.       ALLERGIES:  Review of patient's allergies indicates:  No Known Allergies    FAMILY HISTORY:  Family History   Problem Relation Age of Onset    Hypertension Mother     Cataracts Mother     Hypertension Father     Cancer Maternal Aunt         gallbladder    Diabetes Maternal Grandfather     Hypertension Maternal Grandfather     Hypertension Paternal Grandfather     Heart disease Neg Hx     Glaucoma Neg Hx     Macular degeneration Neg Hx        SOCIAL HISTORY:  Social History     Tobacco Use    Smoking status: Never Smoker    Smokeless tobacco: Never Used   Substance Use Topics    Alcohol use: No    Drug use: No       Review of Systems:  Gen: no fever, no chills, no generalized feeling of weakness   HEENT: no double vision, no blurred vision, no eye pain, no eye exudates. no nasal congestion,   no traumatic injury of head, no neck pain, no neck stiffness. no photophobia or phonophobia at this time. ?    Heart: no chest pain, no SOB    Lungs: no SOB, no cough    MSK: no weakness of legs, intact ROM    ABD: no abd pain, no N/V/D/C, no difficulty with defecation.    Extremities: No leg pain, no edema.       Objective:   There were no vitals filed for this visit.    General: male in NAD, alert and awake, Aox3, well groomed. ?    ? ?    HEENT: Head is NC/AT EOMI, pupil size: 4 mm B/L, no nystagmus noted; hearing grossly intact b/l.      Neck: Supple. no nuchal rigidity.      Cardiovascular: well perfused, no cyanosis        Respiratory: Symmetric chest rise noted       Musculoskeletal: Muscle tone noted to be adequate for patient age, muscle mass is WNL. No spontaneous movements or fasciculations noted during this  examination.       Extremities: No pedal edema or calf tenderness. No cogwheel rigidity noted on B/L UE extremities.       Neurological Examination.    Mental status: AA&O x3; Affect/mood is euthymic/congruent; no aphasia noted during examination. Patient answers simple questions appropriately & follows simple commands; no dysarthria or expressive aphasia; no lyle-neglect or extinction. Vocabulary/word finding: excellent.       Cranial Nerves: II-XII grossly intact.      Muscle Function: Tone WNL and Muscle bulk WNL. Symmetric use of bilateral upper and lower extremities against gravity.     Gait: adequate casual gait with stride length and arm swing WNL.     Assessment:   Seun Wynne is a 31 y.o. male presenting for follow up headache. Patient has improved headaches that previously met International Headache Society Criteria for chronic migraine present more than 15 days month. Medication overuse has greatly improved, which has likely contributed to the significant decrease in headache numbers. We will continue his current regimen of propranolol 30 mg daily and tylenol as needed for headache. We will plan for a 6 month follow up or sooner if needed.    Plan:     Problem List Items Addressed This Visit    None         1. Preventive medications:  Failed:   None    Continue: Propranolol to 30mg daily  Propranolol is a preventive medication for migraine prevention.   Adverse events most commonly observed include low heart rate, low blood pressure and feeling dizzy. Take caution standing up quickly.   We recommend using propranolol as preventive therapy for at least 1 year if you have no significant side effects. We will prescribe the medication until you are on a stable dose, and than the expectation is for you to have the prescription managed by your primary care provider.      2. Acute (abortive) medications- these medications are to be taken only at the onset of severe headache and please limit a total of  any combination of these medications to less than 6 days per month on average because they can cause rebound (medication overuse) headaches.   Failed:   none    CONTINUE: Tylenol at a reduced rate  We repeatedly discussed the need for continued reduction of Tylenol use.  Goal less than 10 days per month.    3. Natural approaches to increasing brain energy and serotonin:    SAMe 200 mg a day    Vitamin B2 400 mg daily    Vitamin B12 1000 mcg daily    Getting early morning light to increase natural serotonin, melatonin. Could also consider light therapy box, 10,000 LUX.     4. Headache prevention and acute treatment over-the counter supplements    Boswellia 800 mg 2-3 times per day, example brand Garcia's, natural anti inflammatory herb    Sleep? modulation- melatonin at night 5 to 10 mg 30 minutes prior to sleep    Feverfew Tea 1-3 cups per day. Can get from [employer] or [email or web address]- A Natural anti inflammatory approach that does not cause further sensitization of the system.    Magnesium Citrate 250 mg daily, slowly increase up to 500-1000 mg daily. Side effect may include diarrhea, so we recommend stopping at whatever dose is comfortable for your body without causing this side effect. This supplement can be very helpful for preventing headache, preventing migraine aura, calming nerves, muscles and even mood. Recommend starting slowly, as it can also lead to increased bowel movements or diarrhea.?     Follow up in 6 months or sooner if needed    I spent a total of 15 minutes on the day of the visit. This includes face to face time and non-face to face time preparing to see the patient (eg, review of tests), obtaining and/or reviewing separately obtained history, documenting clinical information in the electronic or other health record, independently interpreting results and communicating results to the patient/family/caregiver, or care coordinator. Tye Hampton DO was available to contact  throughout this encounter.    Stephanie Pan PA-C

## 2022-08-25 ENCOUNTER — OFFICE VISIT (OUTPATIENT)
Dept: HEPATOLOGY | Facility: CLINIC | Age: 32
End: 2022-08-25
Payer: MEDICAID

## 2022-08-25 VITALS
RESPIRATION RATE: 18 BRPM | HEIGHT: 76 IN | BODY MASS INDEX: 38.36 KG/M2 | OXYGEN SATURATION: 96 % | HEART RATE: 60 BPM | TEMPERATURE: 99 F | DIASTOLIC BLOOD PRESSURE: 79 MMHG | SYSTOLIC BLOOD PRESSURE: 133 MMHG | WEIGHT: 315 LBS

## 2022-08-25 DIAGNOSIS — R74.8 ABNORMAL AST AND ALT: ICD-10-CM

## 2022-08-25 PROCEDURE — 99215 OFFICE O/P EST HI 40 MIN: CPT | Mod: PBBFAC | Performed by: NURSE PRACTITIONER

## 2022-08-25 PROCEDURE — 1159F MED LIST DOCD IN RCRD: CPT | Mod: CPTII,,, | Performed by: NURSE PRACTITIONER

## 2022-08-25 PROCEDURE — 3008F PR BODY MASS INDEX (BMI) DOCUMENTED: ICD-10-PCS | Mod: CPTII,,, | Performed by: NURSE PRACTITIONER

## 2022-08-25 PROCEDURE — 4010F PR ACE/ARB THEARPY RXD/TAKEN: ICD-10-PCS | Mod: CPTII,,, | Performed by: NURSE PRACTITIONER

## 2022-08-25 PROCEDURE — 3075F SYST BP GE 130 - 139MM HG: CPT | Mod: CPTII,,, | Performed by: NURSE PRACTITIONER

## 2022-08-25 PROCEDURE — 99999 PR PBB SHADOW E&M-EST. PATIENT-LVL V: ICD-10-PCS | Mod: PBBFAC,,, | Performed by: NURSE PRACTITIONER

## 2022-08-25 PROCEDURE — 3044F PR MOST RECENT HEMOGLOBIN A1C LEVEL <7.0%: ICD-10-PCS | Mod: CPTII,,, | Performed by: NURSE PRACTITIONER

## 2022-08-25 PROCEDURE — 3044F HG A1C LEVEL LT 7.0%: CPT | Mod: CPTII,,, | Performed by: NURSE PRACTITIONER

## 2022-08-25 PROCEDURE — 99999 PR PBB SHADOW E&M-EST. PATIENT-LVL V: CPT | Mod: PBBFAC,,, | Performed by: NURSE PRACTITIONER

## 2022-08-25 PROCEDURE — 3075F PR MOST RECENT SYSTOLIC BLOOD PRESS GE 130-139MM HG: ICD-10-PCS | Mod: CPTII,,, | Performed by: NURSE PRACTITIONER

## 2022-08-25 PROCEDURE — 3078F DIAST BP <80 MM HG: CPT | Mod: CPTII,,, | Performed by: NURSE PRACTITIONER

## 2022-08-25 PROCEDURE — 99204 PR OFFICE/OUTPT VISIT, NEW, LEVL IV, 45-59 MIN: ICD-10-PCS | Mod: S$PBB,,, | Performed by: NURSE PRACTITIONER

## 2022-08-25 PROCEDURE — 3078F PR MOST RECENT DIASTOLIC BLOOD PRESSURE < 80 MM HG: ICD-10-PCS | Mod: CPTII,,, | Performed by: NURSE PRACTITIONER

## 2022-08-25 PROCEDURE — 4010F ACE/ARB THERAPY RXD/TAKEN: CPT | Mod: CPTII,,, | Performed by: NURSE PRACTITIONER

## 2022-08-25 PROCEDURE — 3008F BODY MASS INDEX DOCD: CPT | Mod: CPTII,,, | Performed by: NURSE PRACTITIONER

## 2022-08-25 PROCEDURE — 99204 OFFICE O/P NEW MOD 45 MIN: CPT | Mod: S$PBB,,, | Performed by: NURSE PRACTITIONER

## 2022-08-25 PROCEDURE — 1159F PR MEDICATION LIST DOCUMENTED IN MEDICAL RECORD: ICD-10-PCS | Mod: CPTII,,, | Performed by: NURSE PRACTITIONER

## 2022-08-25 NOTE — PROGRESS NOTES
[facility] - New Patient     REFERRING PROVIDER: Dr. Karla Arce  PCP: Karla Arce MD    Chief Complaint: Elevated liver enzymes      HISTORY     This is a 31 y.o. male referred for evaluation of elevated liver enzymes.  Here today with mother who is able to assist with history.    No known history of liver disease.    Review of labs:  - Transaminases: intermittently elevated since 2019, ALT>AST, 40-60s  - Alk phos: low  - Synthetic liver function: WNL  - Platelets: WNL    Workup thus far:  Serologies:   Lab Results   Component Value Date    FERRITIN 510 (H) 07/28/2021    FESATURATED 23 07/28/2021    HEPBSAG Negative 03/05/2020    HEPBIGM Negative 03/05/2020    HEPCAB Negative 03/05/2020    HEPAIGM Negative 03/05/2020       CT 10/2018- liver cyst    Risk factors for fatty liver:   · Weight -- Body mass index is 38.99 kg/m².     · Was losing weight unintentionally in 2018 though has gained this back since then                     · Dyslipidemia -- yes                               · Insulin resistance / diabetes -- yes, HgbA1c 6.3         · Hypertension -- no   · Alcohol use -- none    Family history:  Grandmother had cirrhosis from alcohol     Meds:  -Rx: rosuvastatin since 2018, dose recently increased; lexapro since 2019   -OTC, herbs/supplements: MV, vit D; was previously taking large amounts of tylenol for headaches though has stopped this once he was started on propranolol  *No recent medication changes.      Denies recent illness or infection.  No s/s hepatic decompensation.         Past medical history, surgical history, problem list, family history, social history, allergies: Reviewed and updated in the appropriate section of the electronic medical record.      Current Outpatient Medications   Medication Sig Dispense Refill    cholecalciferol, vitamin D3, (VITAMIN D3) 50 mcg (2,000 unit) Cap Take 1 capsule by mouth once daily.      EScitalopram oxalate (LEXAPRO) 20 MG tablet Take  "1 tablet (20 mg total) by mouth once daily. 30 tablet 5    losartan (COZAAR) 25 MG tablet Take 1 tablet (25 mg total) by mouth once daily. 90 tablet 3    multivitamin capsule Take 1 capsule by mouth once daily.      pantoprazole (PROTONIX) 40 MG tablet Take 1 tablet (40 mg total) by mouth once daily. 90 tablet 2    propranoloL (INDERAL) 20 MG tablet Take 1.5 tablets (30 mg total) by mouth once daily. 45 tablet 11    rosuvastatin (CRESTOR) 40 MG Tab Take 1 tablet (40 mg total) by mouth once daily. 90 tablet 3     No current facility-administered medications for this visit.     Medication list reviewed and updated.      Review of Systems   Constitutional: Negative for fatigue or unexpected weight change.   Respiratory: Negative for shortness of breath.    Cardiovascular: Negative for leg swelling.  Gastrointestinal: Negative for abdominal distention, abdominal pain, diarrhea, constipation, nausea, and vomiting. Negative for blood in stool, melena, or hematemesis.  Musculoskeletal: Negative for myalgias.    Skin: Negative for itching.  Neurological: Negative for dizziness or tremors. Negative for confusion, slowed mentation, or memory loss.   Hematological: Does not bruise/bleed easily.   Psychiatric: Negative for mood changes or sleep disturbance.      Physical Exam   Constitutional: Well-nourished. No distress. Alert and oriented.  Eyes: No scleral icterus.   Pulmonary/Chest: Respiratory effort normal. No respiratory distress.   Abdominal: No distension, no ascites appreciated.   Extremities: No edema.   Neurological: No tremor or asterixis. Gait normal.  Skin: No jaundice. No spider telangiectasias or palmar erythema.  Psychiatric: Normal mood and affect. Speech, behavior, and thought content normal. No depression or anxiety noted.       Vitals reviewed.  /79 (BP Location: Right arm, Patient Position: Sitting, BP Method: Large (Automatic))   Pulse 60   Temp 98.6 °F (37 °C) (Oral)   Resp 18   Ht 6' 4" " (1.93 m)   Wt (!) 145.3 kg (320 lb 5.3 oz)   SpO2 96%   BMI 38.99 kg/m²         LABS & DIAGNOSTIC STUDIES     I have personally reviewed pertinent laboratory findings:    Lab Results   Component Value Date    ALT 68 (H) 08/08/2022    AST 45 (H) 08/08/2022    ALKPHOS 46 (L) 08/08/2022    BILITOT 0.6 08/08/2022    ALBUMIN 4.4 08/08/2022       Lab Results   Component Value Date    WBC 5.93 08/08/2022    HGB 11.2 (L) 08/08/2022    HCT 36.9 (L) 08/08/2022    MCV 72 (L) 08/08/2022     08/08/2022       Lab Results   Component Value Date     08/08/2022    K 4.2 08/08/2022    BUN 10 08/08/2022    CREATININE 0.9 08/08/2022    ESTGFRAFRICA >60 12/23/2021    EGFRNONAA >60 12/23/2021       Lab Results   Component Value Date    FERRITIN 510 (H) 07/28/2021    FESATURATED 23 07/28/2021    HEPBSAG Negative 03/05/2020    HEPBIGM Negative 03/05/2020    HEPCAB Negative 03/05/2020    HEPAIGM Negative 03/05/2020       No results found for: AFP    I have personally reviewed the following result reports:  CT - 7/23/18        ASSESSMENT & PLAN     31 y.o. male with:    1. Elevated liver enzymes   -- Suspect he may have fatty liver though will complete serologic workup to rule out other causes of liver disease. His transaminases appear to be trending up with weight gain over the last few years.  -- Anticipate improvement in enzymes with weight loss and good control of metabolic risk factors. Encouraged low carb, low sugar diet.   -- Fibroscan for fibrosis and steatosis staging  -- Update abdominal US      Orders Placed This Encounter   Procedures    FibroScan (Vibration Controlled Transient Elastography)    US Abdomen Limited    Alpha-1-Antitrypsin    BERNICE Screen w/Reflex    Anti-Smooth Muscle Antibody    IgG    Ceruloplasmin    Hepatitis A antibody, IgG    Hepatitis B Core Antibody, Total    Hepatitis B Surface Ab, Qualitative       *See AVS for patient education and instructions.       Return to clinic 1-2 weeks  after labs/US to complete Fibroscan and review results.      Thank you for allowing me to participate in the care of Seun Wynne    Sara Bunch, FNP-C  Hepatology        Duration of encounter: 45 min  This includes face to face time and non-face to face time preparing to see the patient (eg, review of tests), obtaining and/or reviewing separately obtained history, documenting clinical information in the electronic or other health record, independently interpreting results and communicating results to the patient/family/caregiver, or Care coordination.

## 2022-08-25 NOTE — PATIENT INSTRUCTIONS
Labs to rule out causes of liver problems  Ultrasound to look at liver  Fibroscan in our clinic to assess for fatty liver and any liver scarring/damage  Will discuss all results at next visit  Recommend weight loss efforts and trying to keep blood sugar/cholesterol down

## 2022-08-30 ENCOUNTER — PATIENT MESSAGE (OUTPATIENT)
Dept: NEUROLOGY | Facility: CLINIC | Age: 32
End: 2022-08-30
Payer: MEDICAID

## 2022-08-30 DIAGNOSIS — G43.709 CHRONIC MIGRAINE WITHOUT AURA WITHOUT STATUS MIGRAINOSUS, NOT INTRACTABLE: ICD-10-CM

## 2022-08-30 RX ORDER — PROPRANOLOL HYDROCHLORIDE 40 MG/1
40 TABLET ORAL DAILY
Qty: 30 TABLET | Refills: 11 | Status: SHIPPED | OUTPATIENT
Start: 2022-08-30 | End: 2022-11-01 | Stop reason: DRUGHIGH

## 2022-09-06 ENCOUNTER — HOSPITAL ENCOUNTER (OUTPATIENT)
Dept: RADIOLOGY | Facility: HOSPITAL | Age: 32
Discharge: HOME | End: 2022-09-06
Attending: NURSE PRACTITIONER
Payer: MEDICAID

## 2022-09-06 DIAGNOSIS — R74.8 ABNORMAL AST AND ALT: ICD-10-CM

## 2022-09-06 PROCEDURE — 76705 ECHO EXAM OF ABDOMEN: CPT | Mod: TC

## 2022-09-06 PROCEDURE — 76705 US ABDOMEN LIMITED: ICD-10-PCS | Mod: 26,,, | Performed by: RADIOLOGY

## 2022-09-06 PROCEDURE — 76705 ECHO EXAM OF ABDOMEN: CPT | Mod: 26,,, | Performed by: RADIOLOGY

## 2022-09-20 ENCOUNTER — PROCEDURE VISIT (OUTPATIENT)
Dept: HEPATOLOGY | Facility: CLINIC | Age: 32
End: 2022-09-20
Payer: MEDICAID

## 2022-09-20 ENCOUNTER — OFFICE VISIT (OUTPATIENT)
Dept: HEPATOLOGY | Facility: CLINIC | Age: 32
End: 2022-09-20
Payer: MEDICAID

## 2022-09-20 VITALS
HEART RATE: 52 BPM | SYSTOLIC BLOOD PRESSURE: 123 MMHG | DIASTOLIC BLOOD PRESSURE: 84 MMHG | BODY MASS INDEX: 38.36 KG/M2 | RESPIRATION RATE: 18 BRPM | OXYGEN SATURATION: 100 % | WEIGHT: 315 LBS | TEMPERATURE: 98 F | HEIGHT: 76 IN

## 2022-09-20 DIAGNOSIS — Z23 NEED FOR HEPATITIS A AND B VACCINATION: ICD-10-CM

## 2022-09-20 DIAGNOSIS — E78.2 MIXED HYPERLIPIDEMIA: ICD-10-CM

## 2022-09-20 DIAGNOSIS — R73.03 PREDIABETES: ICD-10-CM

## 2022-09-20 DIAGNOSIS — K76.0 NAFLD (NONALCOHOLIC FATTY LIVER DISEASE): Primary | ICD-10-CM

## 2022-09-20 DIAGNOSIS — R74.8 ABNORMAL AST AND ALT: ICD-10-CM

## 2022-09-20 PROCEDURE — 4010F ACE/ARB THERAPY RXD/TAKEN: CPT | Mod: CPTII,,, | Performed by: NURSE PRACTITIONER

## 2022-09-20 PROCEDURE — 3044F PR MOST RECENT HEMOGLOBIN A1C LEVEL <7.0%: ICD-10-PCS | Mod: CPTII,,, | Performed by: NURSE PRACTITIONER

## 2022-09-20 PROCEDURE — 91200 LIVER ELASTOGRAPHY: CPT | Mod: 26,S$PBB,, | Performed by: NURSE PRACTITIONER

## 2022-09-20 PROCEDURE — 1159F MED LIST DOCD IN RCRD: CPT | Mod: CPTII,,, | Performed by: NURSE PRACTITIONER

## 2022-09-20 PROCEDURE — 99214 PR OFFICE/OUTPT VISIT, EST, LEVL IV, 30-39 MIN: ICD-10-PCS | Mod: S$PBB,,, | Performed by: NURSE PRACTITIONER

## 2022-09-20 PROCEDURE — 3044F HG A1C LEVEL LT 7.0%: CPT | Mod: CPTII,,, | Performed by: NURSE PRACTITIONER

## 2022-09-20 PROCEDURE — 99999 PR PBB SHADOW E&M-EST. PATIENT-LVL III: CPT | Mod: PBBFAC,,, | Performed by: NURSE PRACTITIONER

## 2022-09-20 PROCEDURE — 3074F SYST BP LT 130 MM HG: CPT | Mod: CPTII,,, | Performed by: NURSE PRACTITIONER

## 2022-09-20 PROCEDURE — 91200 LIVER ELASTOGRAPHY: CPT | Mod: PBBFAC | Performed by: NURSE PRACTITIONER

## 2022-09-20 PROCEDURE — 4010F PR ACE/ARB THEARPY RXD/TAKEN: ICD-10-PCS | Mod: CPTII,,, | Performed by: NURSE PRACTITIONER

## 2022-09-20 PROCEDURE — 3008F PR BODY MASS INDEX (BMI) DOCUMENTED: ICD-10-PCS | Mod: CPTII,,, | Performed by: NURSE PRACTITIONER

## 2022-09-20 PROCEDURE — 99999 PR PBB SHADOW E&M-EST. PATIENT-LVL III: ICD-10-PCS | Mod: PBBFAC,,, | Performed by: NURSE PRACTITIONER

## 2022-09-20 PROCEDURE — 3079F DIAST BP 80-89 MM HG: CPT | Mod: CPTII,,, | Performed by: NURSE PRACTITIONER

## 2022-09-20 PROCEDURE — 3074F PR MOST RECENT SYSTOLIC BLOOD PRESSURE < 130 MM HG: ICD-10-PCS | Mod: CPTII,,, | Performed by: NURSE PRACTITIONER

## 2022-09-20 PROCEDURE — 3008F BODY MASS INDEX DOCD: CPT | Mod: CPTII,,, | Performed by: NURSE PRACTITIONER

## 2022-09-20 PROCEDURE — 99213 OFFICE O/P EST LOW 20 MIN: CPT | Mod: PBBFAC | Performed by: NURSE PRACTITIONER

## 2022-09-20 PROCEDURE — 1159F PR MEDICATION LIST DOCUMENTED IN MEDICAL RECORD: ICD-10-PCS | Mod: CPTII,,, | Performed by: NURSE PRACTITIONER

## 2022-09-20 PROCEDURE — 91200 FIBROSCAN (VIBRATION CONTROLLED TRANSIENT ELASTOGRAPHY): ICD-10-PCS | Mod: 26,S$PBB,, | Performed by: NURSE PRACTITIONER

## 2022-09-20 PROCEDURE — 99214 OFFICE O/P EST MOD 30 MIN: CPT | Mod: S$PBB,,, | Performed by: NURSE PRACTITIONER

## 2022-09-20 PROCEDURE — 3079F PR MOST RECENT DIASTOLIC BLOOD PRESSURE 80-89 MM HG: ICD-10-PCS | Mod: CPTII,,, | Performed by: NURSE PRACTITIONER

## 2022-09-20 NOTE — PATIENT INSTRUCTIONS
Follow-up in 1 year with repeat Fibroscan  Continue routine labs with PCP. Liver enzymes should improve with weight loss and lower blood sugar  Recommend vaccines for hepatitis A and B (see below)      Hepatitis A/B vaccines:   Your immunity markers show that you do NOT have immunity or protection against Hepatitis A or B.  I recommend that you receive the combination Hepatitis A and B vaccine called TwinRix.     Hepatitis A can be transmitted through food and water and can cause significant liver injury. There recently was an outbreak of hepatitis A in [place].  Hepatitis B is transmitted through blood or bodily fluids and there are typically no symptoms. This can become a chronic or longstanding infection and many people have it for life.  The vaccines will protect your liver from these viruses that can cause significant harm to the liver.    The vaccine series includes 3 vaccines: one now, one at 4 weeks, and one 6 months after the 1st dose.  I sent the vaccine orders to the [facility] pharmacy near you -- [place]    If the vaccines are not covered in the pharmacy, I have also placed an order to get the vaccines in the Infectious Disease department at [facility]. You can call [phone] to schedule your vaccine appointment in the ID injection department here.      If you would like to first determine the cost of the vaccines, you can call the [facility] office at [phone] or [phone].          Snacks: (100-200 calories; >5g protein)     Dairy combos:  - 2 wedges Laughing Cow - Light Herb & Garlic Cheese with sliced cucumber or green bell pepper  - 1/2 cup low-fat cottage cheese with ¼ cup fruit or ¼ cup salsa  - 1/2 cup low fat cottage cheese with 10-15 cherry tomatoes  - 1 low-fat cheese stick with 8 cherry tomatoes or 1 serving fresh fruit    Meat and other proteins:  - 2 slices of turkey reich  - Boiled eggs (can buy at [employer] already boiled w/ shell removed)  - Reji Isaias's Beef  "Steak - 14g protein! (similar to beef jerky but very lean)  - 4 thin slices fat-free turkey breast and 1 slice low-fat cheese  - 4 thin slices fat-free honey ham with wedge of melon  - For convenience,  Victoria read, snack, go (deli meat and cheese rolls)  - P3 packets (Protein packs w/ cheese, nuts, lean deli meat)  - 1/4 cup unsalted nuts with ½ cup fruit  - 6-8 edamame pods (equivalent to about 1/4 cup edamame without pods)    Yogurt, puddings:  - MHP Fit and Lean Protein Pudding (find at [employer] - per 1 cup serving = 100 calories, 15 g protein, 0 g sugar)  - 6-oz container Dannon Light n' Fit vanilla yogurt, topped with 1oz unsalted nuts         - 2 Tbsp PB2 mixed in light or greek yogurt or sugar-free pudding    Fruits and vegetable combos:  - Apple, celery or baby carrots spread with 2 Tbsp PB2  - Apple slices with 1 oz slice low-fat cheese  - Apple slices dipped in 2 Tbsp of PB2  - Celery, cucumber, bell pepper or baby carrots dipped in ¼ cup hummus bean spread   - Celery, cucumber, baby carrots dipped in high protein greek yogurt (Mix 16 oz plain greek yogurt + 1 packet of hidden valley ranch dip mix)    Drinks:  - 8 oz glass of FAIRLIFE fat free milk (13 g protein)  - 8 oz glass of FAIRLIFE fat free milk + 1 packet of sugar-free hot cocoa  - Add Atkins advantage Cafe Caramel shake to decaf coffee. Serve hot or blend with ice for "frappaccino" like drink    PROTEIN:  - Protein Shakes: Atkins, Low Carb Slim Fast, EAS light, Muscle Milk Light, Iconic etc.  - Protein Powder: GNC Soy95, Isopure, Nectar, UNJURY, Whey Gourmet, etc. Mix 1 scoop powder with 8oz skim/1% milk or light soymilk.  - Protein Bars: Atkins, EAS, Pure Protein,  Quest, Think Thin, Detour, etc. Must have 0-4 grams sugar - Read the label.                    "

## 2022-09-20 NOTE — PROGRESS NOTES
[facility] - Established Patient    Last Clinic Visit: 8/25/22    Chief Complaint: Follow-up for elevated liver enzymes         HISTORY     This is a 32 y.o. male with PMH noted below, here for follow-up of elevated liver enzymes and newly diagnosed fatty liver.    Fatty liver first noted on abd US 9/6/22. Liver enlarged at 23 cm. No biliary dilation or liver lesions. No findings to suggest advanced liver disease.     His transaminases have been intermittently elevated since 2019, ALT>AST, 40-60s. Enzymes trending up with weight gain and higher blood sugar.    Serologic workup has been negative for Albino's, alpha-1 antitrypsin deficiency, hemochromatosis, autoimmune etiology, and viral hepatitis. Ferritin >500 though iron sat WNL.     Fibrosis staging:   Fibroscan today = F2 (kPa 7.7), S3 ()    Health Maintenance:  - Most recent imaging: above  - Hepatitis A & B vaccination: needs vaccines    Interval history:  Here to review results of recent workup.  Mom present. She reports diet high in protein though does eat a lot of sandwiches and snacks of chips. Does not like fruit or vegetables.     Updates on risk factors for fatty liver:  Weight -- Body mass index is 38.35 kg/m².    Weight trending up right now    He was losing weight unintentionally in 2018 though has gained ~100 lb since then                    Dyslipidemia -- yes, on statin                                 Insulin resistance / diabetes -- yes, HgbA1c 6.3       Hypertension -- no  Alcohol use -- none               Past medical history, surgical history, problem list, family history, social history, allergies: Reviewed and updated in the appropriate section of the electronic medical record.      Current Outpatient Medications   Medication Sig Dispense Refill    cholecalciferol, vitamin D3, (VITAMIN D3) 50 mcg (2,000 unit) Cap Take 1 capsule by mouth once daily.      EScitalopram oxalate (LEXAPRO) 20 MG tablet Take 1 tablet (20 mg  total) by mouth once daily. 30 tablet 5    losartan (COZAAR) 25 MG tablet Take 1 tablet (25 mg total) by mouth once daily. 90 tablet 3    multivitamin capsule Take 1 capsule by mouth once daily.      pantoprazole (PROTONIX) 40 MG tablet Take 1 tablet (40 mg total) by mouth once daily. 90 tablet 2    propranoloL (INDERAL) 40 MG tablet Take 1 tablet (40 mg total) by mouth once daily. 30 tablet 11    rosuvastatin (CRESTOR) 40 MG Tab Take 1 tablet (40 mg total) by mouth once daily. 90 tablet 3    hepatitis A and B vaccine, PF, (TWINRIX) 720 PRISCILLA unit- 20 mcg/mL Syrg suspension Inject 1 mL (720 Units total) into the muscle once. Second dose 1 month from initial dose. Third dose 6 months from initial dose. for 1 dose 1 mL 2     No current facility-administered medications for this visit.     Medication list reviewed and updated.      Review of Systems   Constitutional: Negative for fatigue. +weight gain  Respiratory: Negative for shortness of breath.    Cardiovascular: Negative for leg swelling.  Gastrointestinal: Negative for abdominal distention, abdominal pain, diarrhea, constipation, nausea, and vomiting. Negative for blood in stool, melena, or hematemesis.  Musculoskeletal: Negative for myalgias.    Skin: Negative for itching.  Neurological: Negative for dizziness or tremors. Negative for confusion, slowed mentation, or memory loss.   Hematological: Does not bruise/bleed easily.   Psychiatric: Negative for mood changes or sleep disturbance.      Physical Exam   Constitutional: Well-nourished. No distress. Alert and oriented.  Eyes: No scleral icterus.   Pulmonary/Chest: Respiratory effort normal. No respiratory distress.   Abdominal: No distension, no ascites appreciated.   Extremities: No edema.   Neurological: No tremor or asterixis. Gait normal.  Skin: No jaundice. No spider telangiectasias or palmar erythema.  Psychiatric: Normal mood and affect. Speech, behavior, and thought content normal. No depression or  "anxiety noted.       Vitals reviewed.  /84 (BP Location: Right arm, Patient Position: Sitting, BP Method: Large (Automatic))   Pulse (!) 52   Temp 98.2 °F (36.8 °C) (Oral)   Resp 18   Ht 6' 4" (1.93 m)   Wt (!) 142.9 kg (315 lb 0.6 oz)   SpO2 100%   BMI 38.35 kg/m²       LABS & DIAGNOSTIC STUDIES     I have personally reviewed pertinent laboratory findings:    Lab Results   Component Value Date    ALT 68 (H) 08/08/2022    AST 45 (H) 08/08/2022    ALKPHOS 46 (L) 08/08/2022    BILITOT 0.6 08/08/2022    ALBUMIN 4.4 08/08/2022       Lab Results   Component Value Date    WBC 5.93 08/08/2022    HGB 11.2 (L) 08/08/2022    HCT 36.9 (L) 08/08/2022    MCV 72 (L) 08/08/2022     08/08/2022       Lab Results   Component Value Date     08/08/2022    K 4.2 08/08/2022    BUN 10 08/08/2022    CREATININE 0.9 08/08/2022    ESTGFRAFRICA >60 12/23/2021    EGFRNONAA >60 12/23/2021       Lab Results   Component Value Date    SMOOTHMUSCAB Negative 1:40 09/06/2022    IGGSERUM 1007 09/06/2022    ANASCREEN Negative <1:80 09/06/2022    FERRITIN 510 (H) 07/28/2021    FESATURATED 23 07/28/2021    CERULOPLSM 26.0 09/06/2022    HEPBSAG Negative 03/05/2020    HEPBIGM Negative 03/05/2020    HEPBCAB Non-reactive 09/06/2022    HEPCAB Negative 03/05/2020    HEPAIGM Negative 03/05/2020       No results found for: AFP    I have personally reviewed the following result reports:  Abdominal US - 9/6/22      ASSESSMENT & PLAN     32 y.o. male with:    1. NAFLD with hepatic fibrosis  -- Fibroscan shows significant steatosis and moderate fibrosis (F2); can reassess in 1 year  -- Serologic workup negative for other causes of liver disease. Anticipate improvement in liver enzymes with weight loss and good control of metabolic risk factors.  -- Discussed BROUSSARD clinical trials; will think about this.  -- Recommend hep A/B vaccines, ordered    Fatty liver discussion:  - Reviewed risk of hepatic inflammation and subsequent fibrosis from " fatty liver.  - At this time, the only treatment for fatty liver is weight loss and maintaining good control of metabolic risk factors (blood pressure, cholesterol, and blood sugar).     2. Body mass index is 38.35 kg/m²., HLD, pre-diabetes  -- Reviewed weight loss strategies including dietary changes and physical activity. Discussed low carbohydrate, low sugar diet. Recommend weight loss goal of 10% (about 31 lb).   -- We discussed additional weight loss resources including dietician consult, [facility] program, and bariatric medicine consult. They will let us know if interested.         Orders Placed This Encounter   Procedures    Hepatitis A / Hepatitis B Combined Vaccine (IM)    Ambulatory referral/consult to Infectious Disease Injection Dept       *See AVS for patient education and instructions.      Return to clinic in 1 year with Fibroscan.      Thank you for allowing me to participate in the care of Seun Nunn Ricci Bunch, FNP-C  Hepatology        Duration of encounter: 30 min  This includes face to face time and non-face to face time preparing to see the patient (eg, review of tests), obtaining and/or reviewing separately obtained history, documenting clinical information in the electronic or other health record, independently interpreting results and communicating results to the patient/family/caregiver, or care coordination.

## 2022-09-20 NOTE — PROCEDURES
FibroScan (Vibration Controlled Transient Elastography)    Date/Time: 9/20/2022 11:45 AM  Performed by: Sara Bunch NP  Authorized by: Sara Bunch NP     Diagnosis:  NAFLD    Probe:  XL    Universal Protocol: Patient's identity, procedure and site were verified, confirmatory pause was performed.  Discussed procedure including risks and potential complications.  Questions answered.  Patient verbalizes understanding and wishes to proceed with VCTE.     Procedure: After providing explanations of the procedure, patient was placed in the supine position with right arm in maximum abduction to allow optimal exposure of right lateral abdomen.  Patient was briefly assessed, Testing was performed in the mid-axillary location, 50Hz Shear Wave pulses were applied and the resulting Shear Wave and Propagation Speed detected with a 3.5 MHz ultrasonic signal, using the FibroScan probe, Skin to liver capsule distance and liver parenchyma were accessed during the entire examination with the FibroScan probe, Patient was instructed to breathe normally and to abstain from sudden movements during the procedure, allowing for random measurements of liver stiffness. At least 10 Shear Waves were produced, Individual measurements of each Shear Wave were calculated.  Patient tolerated the procedure well with no complications.  Meets discharge criteria as was dismissed.  Rates pain 0 out of 10.  Patient will follow up with ordering provider to review results.    Findings  Median liver stiffness score:  7.7  CAP Reading: dB/m:  304    IQR/med %:  17  Interpretation  Fibrosis interpretation is based on medial liver stiffness - Kilopascal (kPa).    Fibrosis Stage:  F2  Steatosis interpretation is based on controlled attenuation parameter - (dB/m).    Steatosis Grade:  S3

## 2022-10-31 ENCOUNTER — PATIENT MESSAGE (OUTPATIENT)
Dept: NEUROLOGY | Facility: CLINIC | Age: 32
End: 2022-10-31
Payer: MEDICAID

## 2022-11-01 DIAGNOSIS — G43.709 CHRONIC MIGRAINE WITHOUT AURA WITHOUT STATUS MIGRAINOSUS, NOT INTRACTABLE: ICD-10-CM

## 2022-11-01 RX ORDER — PROPRANOLOL HYDROCHLORIDE 60 MG/1
60 CAPSULE, EXTENDED RELEASE ORAL DAILY
Qty: 30 CAPSULE | Refills: 11 | Status: SHIPPED | OUTPATIENT
Start: 2022-11-01 | End: 2023-03-10 | Stop reason: SDUPTHER

## 2022-11-15 ENCOUNTER — PATIENT MESSAGE (OUTPATIENT)
Dept: PSYCHIATRY | Facility: CLINIC | Age: 32
End: 2022-11-15
Payer: MEDICAID

## 2022-11-16 ENCOUNTER — PATIENT MESSAGE (OUTPATIENT)
Dept: NEUROLOGY | Facility: CLINIC | Age: 32
End: 2022-11-16
Payer: MEDICAID

## 2022-11-18 ENCOUNTER — LAB VISIT (OUTPATIENT)
Dept: LAB | Facility: HOSPITAL | Age: 32
End: 2022-11-18
Attending: INTERNAL MEDICINE
Payer: MEDICAID

## 2022-11-18 DIAGNOSIS — E78.2 MIXED HYPERLIPIDEMIA: ICD-10-CM

## 2022-11-18 LAB
ALBUMIN SERPL BCP-MCNC: 4.6 G/DL (ref 3.5–5.2)
ALP SERPL-CCNC: 43 U/L (ref 55–135)
ALT SERPL W/O P-5'-P-CCNC: 90 U/L (ref 10–44)
ANION GAP SERPL CALC-SCNC: 9 MMOL/L (ref 8–16)
AST SERPL-CCNC: 56 U/L (ref 10–40)
BILIRUB SERPL-MCNC: 0.6 MG/DL (ref 0.1–1)
BUN SERPL-MCNC: 12 MG/DL (ref 6–20)
CALCIUM SERPL-MCNC: 10.1 MG/DL (ref 8.7–10.5)
CHLORIDE SERPL-SCNC: 103 MMOL/L (ref 95–110)
CHOLEST SERPL-MCNC: 211 MG/DL (ref 120–199)
CHOLEST/HDLC SERPL: 4.6 {RATIO} (ref 2–5)
CO2 SERPL-SCNC: 28 MMOL/L (ref 23–29)
CREAT SERPL-MCNC: 0.8 MG/DL (ref 0.5–1.4)
EST. GFR  (NO RACE VARIABLE): >60 ML/MIN/1.73 M^2
GLUCOSE SERPL-MCNC: 91 MG/DL (ref 70–110)
HDLC SERPL-MCNC: 46 MG/DL (ref 40–75)
HDLC SERPL: 21.8 % (ref 20–50)
LDLC SERPL CALC-MCNC: 117 MG/DL (ref 63–159)
NONHDLC SERPL-MCNC: 165 MG/DL
POTASSIUM SERPL-SCNC: 4.5 MMOL/L (ref 3.5–5.1)
PROT SERPL-MCNC: 7.9 G/DL (ref 6–8.4)
SODIUM SERPL-SCNC: 140 MMOL/L (ref 136–145)
TRIGL SERPL-MCNC: 240 MG/DL (ref 30–150)

## 2022-11-18 PROCEDURE — 80053 COMPREHEN METABOLIC PANEL: CPT | Performed by: INTERNAL MEDICINE

## 2022-11-18 PROCEDURE — 80061 LIPID PANEL: CPT | Performed by: INTERNAL MEDICINE

## 2022-11-18 PROCEDURE — 36415 COLL VENOUS BLD VENIPUNCTURE: CPT | Mod: PO | Performed by: INTERNAL MEDICINE

## 2022-11-21 ENCOUNTER — TELEPHONE (OUTPATIENT)
Dept: INTERNAL MEDICINE | Facility: CLINIC | Age: 32
End: 2022-11-21
Payer: MEDICAID

## 2022-11-21 NOTE — TELEPHONE ENCOUNTER
----- Message from Karla Arce MD sent at 11/19/2022  4:11 PM CST -----  The blood sugar level is normal. Kidney function is normal.    The liver enzyme levels are elevated (due to fatty liver disease) but are slightly higher. Will continue to check them periodically. Avoid taking tylenol.    Total cholesterol and triglyceride levels have increased. Continue to take Crestor every day and try to avoid sugars and fats in diet.

## 2022-11-23 ENCOUNTER — TELEPHONE (OUTPATIENT)
Dept: INTERNAL MEDICINE | Facility: CLINIC | Age: 32
End: 2022-11-23
Payer: MEDICAID

## 2022-12-09 ENCOUNTER — OFFICE VISIT (OUTPATIENT)
Dept: PSYCHIATRY | Facility: CLINIC | Age: 32
End: 2022-12-09
Payer: MEDICAID

## 2022-12-09 DIAGNOSIS — F84.9 PERVASIVE DEVELOPMENTAL DISORDER: ICD-10-CM

## 2022-12-09 DIAGNOSIS — F40.10 SOCIAL ANXIETY DISORDER: Primary | ICD-10-CM

## 2022-12-09 DIAGNOSIS — F70 MILD INTELLECTUAL DISABILITY: ICD-10-CM

## 2022-12-09 PROCEDURE — 3044F HG A1C LEVEL LT 7.0%: CPT | Mod: CPTII,95,, | Performed by: PSYCHIATRY & NEUROLOGY

## 2022-12-09 PROCEDURE — 1159F MED LIST DOCD IN RCRD: CPT | Mod: CPTII,95,, | Performed by: PSYCHIATRY & NEUROLOGY

## 2022-12-09 PROCEDURE — 4010F PR ACE/ARB THEARPY RXD/TAKEN: ICD-10-PCS | Mod: CPTII,95,, | Performed by: PSYCHIATRY & NEUROLOGY

## 2022-12-09 PROCEDURE — 1160F RVW MEDS BY RX/DR IN RCRD: CPT | Mod: CPTII,95,, | Performed by: PSYCHIATRY & NEUROLOGY

## 2022-12-09 PROCEDURE — 1160F PR REVIEW ALL MEDS BY PRESCRIBER/CLIN PHARMACIST DOCUMENTED: ICD-10-PCS | Mod: CPTII,95,, | Performed by: PSYCHIATRY & NEUROLOGY

## 2022-12-09 PROCEDURE — 99214 OFFICE O/P EST MOD 30 MIN: CPT | Mod: 95,AF,HB, | Performed by: PSYCHIATRY & NEUROLOGY

## 2022-12-09 PROCEDURE — 90833 PR PSYCHOTHERAPY W/PATIENT W/E&M, 30 MIN (ADD ON): ICD-10-PCS | Mod: 95,AF,HB, | Performed by: PSYCHIATRY & NEUROLOGY

## 2022-12-09 PROCEDURE — 1159F PR MEDICATION LIST DOCUMENTED IN MEDICAL RECORD: ICD-10-PCS | Mod: CPTII,95,, | Performed by: PSYCHIATRY & NEUROLOGY

## 2022-12-09 PROCEDURE — 90833 PSYTX W PT W E/M 30 MIN: CPT | Mod: 95,AF,HB, | Performed by: PSYCHIATRY & NEUROLOGY

## 2022-12-09 PROCEDURE — 3044F PR MOST RECENT HEMOGLOBIN A1C LEVEL <7.0%: ICD-10-PCS | Mod: CPTII,95,, | Performed by: PSYCHIATRY & NEUROLOGY

## 2022-12-09 PROCEDURE — 99214 PR OFFICE/OUTPT VISIT, EST, LEVL IV, 30-39 MIN: ICD-10-PCS | Mod: 95,AF,HB, | Performed by: PSYCHIATRY & NEUROLOGY

## 2022-12-09 PROCEDURE — 4010F ACE/ARB THERAPY RXD/TAKEN: CPT | Mod: CPTII,95,, | Performed by: PSYCHIATRY & NEUROLOGY

## 2022-12-09 RX ORDER — DESMOPRESSIN ACETATE 0.2 MG/1
0.2 TABLET ORAL DAILY
Qty: 30 TABLET | Refills: 5 | Status: SHIPPED | OUTPATIENT
Start: 2022-12-09 | End: 2023-09-27

## 2022-12-09 NOTE — PROGRESS NOTES
Outpatient Psychiatry Follow-Up Visit (MD/NP)    12/9/2022    Clinical Status of Patient:  Outpatient (Ambulatory)  The patient location is: at home in [facility]  The chief complaint leading to consultation is: below  Visit type: simultaneous audio-visual  Total time spent with patient: 29 minutes  Each patient to whom he or she provides medical services by telemedicine is:  (1) informed of the relationship between the physician and patient and the respective role of any other health care provider with respect to management of the patient; and (2) notified that he or she may decline to receive medical services by telemedicine and may withdraw from such care at any time.      Chief Complaint:  Seun Wynne is a 32 y.o. male who presents today for follow-up of anxiety and ASD with language and intellectual impairment .  Met with patient, mother and father.      Interval History and Content of Current Session:  Interim Events/Subjective Report/Content of Current Session: doing the same. Anxiety still quite well controlled. Efforts at long term housing plans on hold since beginning of pandemic. Some further options discussed. Day programs discussed.    Psychotherapy:  Target symptoms: anxiety   Why chosen therapy is appropriate versus another modality: relevant to diagnosis  Outcome monitoring methods: self-report, observation, feedback from family  Therapeutic intervention type: behavior modifying psychotherapy  Topics discussed/themes: parenting issues, building skills sets for symptom management, financial stressors  The patient's response to the intervention is motivated. The patient's progress toward treatment goals is fair.   Duration of intervention: 19 minutes.    Review of Systems   PSYCHIATRIC: Pertinant items are noted in the narrative.  CONSTITUTIONAL: No weight gain or loss.   MUSCULOSKELETAL: No pain or stiffness of the joints.  NEUROLOGIC: No weakness, sensory changes, seizures, confusion, memory  "loss, tremor or other abnormal movements.  ENDOCRINE: No polydipsia or polyuria.  INTEGUMENTARY: No rashes or lacerations.  EYES: No exophthalmos, jaundice or blindness.  ENT: No dizziness, tinnitus or hearing loss.  RESPIRATORY: No shortness of breath.  CARDIOVASCULAR: No tachycardia or chest pain.  GASTROINTESTINAL: No nausea, vomiting, pain, constipation or diarrhea.  GENITOURINARY: No frequency, dysuria or sexual dysfunction.  HEMATOLOGIC/LYMPHATIC: No excessive bleeding, prolonged or excessive bleeding after dental extraction/injury.  ALLERGIC/IMMUNOLOGIC: No allergic response to materials, foods or animals at this time.    Past Medical, Family and Social History: The patient's past medical, family and social history have been reviewed and updated as appropriate within the electronic medical record - see encounter notes.  Past Medical History:   Diagnosis Date    Asperger syndrome     Diabetes mellitus, type 2     "under control after weight loss"    Hyperlipidemia     Hypertension     "BEEN FINE IN RECENT YRS NEVER TOOK MEDS"    Obesity      Past Surgical History:   Procedure Laterality Date    ESOPHAGOGASTRODUODENOSCOPY N/A 6/19/2018    Procedure: ESOPHAGOGASTRODUODENOSCOPY (EGD);  Surgeon: Darell Gautam Jr., MD;  Location: [facility];  Service: Endoscopy;  Laterality: N/A;     Current Outpatient Medications on File Prior to Visit   Medication Sig Dispense Refill    cholecalciferol, vitamin D3, (VITAMIN D3) 50 mcg (2,000 unit) Cap Take 1 capsule by mouth once daily.      EScitalopram oxalate (LEXAPRO) 20 MG tablet Take 1 tablet (20 mg total) by mouth once daily. 30 tablet 5    losartan (COZAAR) 25 MG tablet Take 1 tablet (25 mg total) by mouth once daily. 90 tablet 3    multivitamin capsule Take 1 capsule by mouth once daily.      pantoprazole (PROTONIX) 40 MG tablet Take 1 tablet (40 mg total) by mouth once daily. 90 tablet 2    propranoloL (INDERAL LA) 60 MG 24 hr capsule Take 1 capsule (60 mg total) by " mouth once daily. 30 capsule 11    rosuvastatin (CRESTOR) 40 MG Tab Take 1 tablet (40 mg total) by mouth once daily. 90 tablet 3     No current facility-administered medications on file prior to visit.       Compliance: yes    Side effects: None    Risk Parameters:  Patient reports no suicidal ideation  Patient reports no homicidal ideation  Patient reports no self-injurious behavior  Patient reports no violent behavior    Exam (detailed: at least 9 elements; comprehensive: all 15 elements)   Constitutional  Vitals:  Most recent vital signs, dated greater than 90 days prior to this appointment, were reviewed.   There were no vitals filed for this visit.     General:  age appropriate, casually dressed, neatly groomed, overweight     Musculoskeletal  Muscle Strength/Tone:  no tremor, no tic   Gait & Station:  non-ataxic     Psychiatric  Speech:  aphonic   Mood & Affect:  happy  congruent and appropriate   Thought Process:  concrete   Associations:  unable to assess   Thought Content:  no suicidality, no homicidality, delusions, or paranoia   Insight:  limited awareness of illness   Judgement: impaired due to intellectual limitations   Orientation:  person, place, situation, day of week   Memory: not tested   Language: very limited expressive language   Attention Span & Concentration:  able to focus   Fund of Knowledge:  intact and appropriate to age and level of education     Assessment and Diagnosis   Status/Progress: Based on the examination today, the patient's problem(s) is/are  adequately controlled .  New problems have not been presented today.   Co-morbidities are complicating management of the primary condition.  There are no active rule-out diagnoses for this patient at this time.     General Impression: doing okay, but still avoidant during family social events despite considerable preparation- goes to get haircut, gets up and dressed, etc. So he clearly wants to participate, but when events begin he cannot  come out of his room. I think it absolutely is worth a try of DDAVP to see whether we can help him participate comfortably        ICD-10-CM ICD-9-CM   1. Social anxiety disorder  F40.10 300.23   2. Pervasive developmental disorder  F84.9 299.90   3. Mild intellectual disability  F70 317       Intervention/Counseling/Treatment Plan   Medication Management: Continue current medications.  But add trial of desmopressin 0.2 mg nightly for sociability in ASD   The risks and benefits of medication were discussed with the patient.  Outside records/collateral information from additional sources: reviewed collateral from parents and [facility]  Counseling provided with patient and family as follows: importance of compliance with chosen treatment options was emphasized, risks and benefits of treatment options, including medications, were discussed with the patient  Care Coordination: During the visit, care coordination was conducted with  family.      Return to Clinic: 6 months

## 2022-12-19 ENCOUNTER — PATIENT MESSAGE (OUTPATIENT)
Dept: NEUROLOGY | Facility: CLINIC | Age: 32
End: 2022-12-19
Payer: MEDICAID

## 2022-12-19 DIAGNOSIS — G43.709 CHRONIC MIGRAINE WITHOUT AURA WITHOUT STATUS MIGRAINOSUS, NOT INTRACTABLE: Primary | ICD-10-CM

## 2022-12-19 RX ORDER — TOPIRAMATE 25 MG/1
TABLET ORAL
Qty: 60 TABLET | Refills: 11 | Status: SHIPPED | OUTPATIENT
Start: 2022-12-19 | End: 2023-03-10

## 2022-12-21 ENCOUNTER — PATIENT MESSAGE (OUTPATIENT)
Dept: NEUROLOGY | Facility: CLINIC | Age: 32
End: 2022-12-21
Payer: MEDICAID

## 2023-02-13 ENCOUNTER — TELEPHONE (OUTPATIENT)
Dept: NEUROLOGY | Facility: CLINIC | Age: 33
End: 2023-02-13
Payer: MEDICAID

## 2023-02-15 ENCOUNTER — TELEPHONE (OUTPATIENT)
Dept: NEUROLOGY | Facility: CLINIC | Age: 33
End: 2023-02-15
Payer: MEDICAID

## 2023-03-10 ENCOUNTER — OFFICE VISIT (OUTPATIENT)
Dept: NEUROLOGY | Facility: CLINIC | Age: 33
End: 2023-03-10
Payer: MEDICAID

## 2023-03-10 VITALS
HEART RATE: 61 BPM | BODY MASS INDEX: 38.36 KG/M2 | HEIGHT: 76 IN | DIASTOLIC BLOOD PRESSURE: 75 MMHG | WEIGHT: 315 LBS | SYSTOLIC BLOOD PRESSURE: 121 MMHG

## 2023-03-10 DIAGNOSIS — G43.709 CHRONIC MIGRAINE WITHOUT AURA WITHOUT STATUS MIGRAINOSUS, NOT INTRACTABLE: ICD-10-CM

## 2023-03-10 PROCEDURE — 99999 PR PBB SHADOW E&M-EST. PATIENT-LVL III: CPT | Mod: PBBFAC,,,

## 2023-03-10 PROCEDURE — 1159F MED LIST DOCD IN RCRD: CPT | Mod: CPTII,,,

## 2023-03-10 PROCEDURE — 3074F SYST BP LT 130 MM HG: CPT | Mod: CPTII,,,

## 2023-03-10 PROCEDURE — 4010F PR ACE/ARB THEARPY RXD/TAKEN: ICD-10-PCS | Mod: CPTII,,,

## 2023-03-10 PROCEDURE — 99213 OFFICE O/P EST LOW 20 MIN: CPT | Mod: PBBFAC,PO

## 2023-03-10 PROCEDURE — 99213 OFFICE O/P EST LOW 20 MIN: CPT | Mod: S$PBB,,,

## 2023-03-10 PROCEDURE — 1160F RVW MEDS BY RX/DR IN RCRD: CPT | Mod: CPTII,,,

## 2023-03-10 PROCEDURE — 3078F PR MOST RECENT DIASTOLIC BLOOD PRESSURE < 80 MM HG: ICD-10-PCS | Mod: CPTII,,,

## 2023-03-10 PROCEDURE — 3008F PR BODY MASS INDEX (BMI) DOCUMENTED: ICD-10-PCS | Mod: CPTII,,,

## 2023-03-10 PROCEDURE — 4010F ACE/ARB THERAPY RXD/TAKEN: CPT | Mod: CPTII,,,

## 2023-03-10 PROCEDURE — 3078F DIAST BP <80 MM HG: CPT | Mod: CPTII,,,

## 2023-03-10 PROCEDURE — 99999 PR PBB SHADOW E&M-EST. PATIENT-LVL III: ICD-10-PCS | Mod: PBBFAC,,,

## 2023-03-10 PROCEDURE — 1160F PR REVIEW ALL MEDS BY PRESCRIBER/CLIN PHARMACIST DOCUMENTED: ICD-10-PCS | Mod: CPTII,,,

## 2023-03-10 PROCEDURE — 99213 PR OFFICE/OUTPT VISIT, EST, LEVL III, 20-29 MIN: ICD-10-PCS | Mod: S$PBB,,,

## 2023-03-10 PROCEDURE — 3074F PR MOST RECENT SYSTOLIC BLOOD PRESSURE < 130 MM HG: ICD-10-PCS | Mod: CPTII,,,

## 2023-03-10 PROCEDURE — 3008F BODY MASS INDEX DOCD: CPT | Mod: CPTII,,,

## 2023-03-10 PROCEDURE — 1159F PR MEDICATION LIST DOCUMENTED IN MEDICAL RECORD: ICD-10-PCS | Mod: CPTII,,,

## 2023-03-10 RX ORDER — TOPIRAMATE 25 MG/1
50 TABLET ORAL DAILY
Qty: 180 TABLET | Refills: 3 | Status: SHIPPED | OUTPATIENT
Start: 2023-03-10 | End: 2023-10-05

## 2023-03-10 RX ORDER — PROPRANOLOL HYDROCHLORIDE 60 MG/1
60 CAPSULE, EXTENDED RELEASE ORAL DAILY
Qty: 90 CAPSULE | Refills: 3 | Status: SHIPPED | OUTPATIENT
Start: 2023-03-10 | End: 2024-03-09

## 2023-03-10 NOTE — PROGRESS NOTES
[facility] NEUROLOGY  [facility], [place]    Date: 3/10/23  Patient Name: Seun Wynne   MRN: [id number]   PCP: Karla Arce  Referring Provider: No ref. provider found    Chief Complaint: Follow up headache  Subjective:      HPI:     3/10/23: Patient presents for follow up migraines. After flare up of migraine since last visit, topiramate 25 mg daily was added to his migraine preventative regimen. Patient notes some improvement with this medication. He endorses a few headaches a week. His mother states he has been needing to utilize Tylenol often indicating that migraine control could be improved.    We discussed anti-CGRP injectables for migraine, but would like to defer at this time in favor of increasing topiramate.    Otherwise no new neurological complaints.    =================================================  8/24/22: Mr. Seun Wynne is a 32 y.o. male presenting for follow up headaches. He reports an estimated 1/30 headache days in the last month. Severity of this headache was rated as 8/10 and was relieved with tylenol. Patient continues on propranolol 30 mg daily, without any side effects. Patient's mother holds onto the tylenol so that it is utilized only when needed and to prevent medication overuse headaches.    Overall no new complaints.    =================================================  Per documentation by Dr. Tye Hampton Do on 5/24/22:  05/24/22:  Patient is once again joined by his mother at the time of the encounter.  They jointly share that the patient has experienced fewer headache days in the last month.  After much discussion, they roughly estimated total headache days at 15-20 with approximately 5-7 severe episodes.  Tylenol consumption has certainly decreased and they estimate less than 15 days per month, possibly less than 10 days in the last month.  No side effects with propranolol regimen; currently 20 mg once daily.    Overall, no new  "complaints.  Headache stable over previous description.    =================================================  04/12/22 HPI:  Mr. Wynne presents today to discuss his ongoing uncontrolled headaches.  He is joined by his mother at the time today's encounter who provides much of the history. Seun was helpful in providing some specifics of the headache qualities but his mother was a valuable resource in collecting facts about over-the-counter medication use and other elements of history.  Clinical encounter consistent with the patient's hx.    Age of onset: 29  When did they become more of a problem: 30 YO  # of Total headache days per month: 20-25  # of Migraine days per month: 10+  Intensity of average and most severe headaches: 3-5/10, 7-8/10  Duration of headache pain: >4 hrs untreated  Quality of pain: Pulsating/Throbbing  Laterality: bilateral  Location: frontal   Photophobia and phonophobia: no  Nausea and vomiting: yes  Causes avoidance of physical activity: yes  Worsened by physical activity: yes  Preventive Medications failed: none  Acute medications failed: renu  OTC Medications: tylenol (30 days per month, over a year)  Hx of (Bolded items are positive): depression, anxiety, fibromyalgia, autoimmune disorder, head trauma, neurological infection, glaucoma, asthma, nephrolithiasis, MI, stroke  Is medication overuse contributing to the patient's current migraine burden: YES  Aura: no  Autonomic symptoms: no    Family Hx of migraines: none    Latest Imaging: none      PAST MEDICAL HISTORY:  Past Medical History:   Diagnosis Date    Asperger syndrome     Diabetes mellitus, type 2     "under control after weight loss"    Hyperlipidemia     Hypertension     "BEEN FINE IN RECENT YRS NEVER TOOK MEDS"    Obesity        PAST SURGICAL HISTORY:  Past Surgical History:   Procedure Laterality Date    ESOPHAGOGASTRODUODENOSCOPY N/A 6/19/2018    Procedure: ESOPHAGOGASTRODUODENOSCOPY (EGD);  Surgeon: Darell Gautam Jr., " MD;  Location[facility];  Service: Endoscopy;  Laterality: N/A;       CURRENT MEDS:  Current Outpatient Medications   Medication Sig Dispense Refill    cholecalciferol, vitamin D3, (VITAMIN D3) 50 mcg (2,000 unit) Cap Take 1 capsule by mouth once daily.      EScitalopram oxalate (LEXAPRO) 20 MG tablet Take 1 tablet (20 mg total) by mouth once daily. 30 tablet 5    losartan (COZAAR) 25 MG tablet Take 1 tablet (25 mg total) by mouth once daily. 90 tablet 1    multivitamin capsule Take 1 capsule by mouth once daily.      pantoprazole (PROTONIX) 40 MG tablet Take 1 tablet (40 mg total) by mouth once daily. 90 tablet 2    rosuvastatin (CRESTOR) 40 MG Tab Take 1 tablet (40 mg total) by mouth once daily. 90 tablet 3    desmopressin (DDAVP) 0.2 MG tablet Take 1 tablet (200 mcg total) by mouth once daily. (Patient not taking: Reported on 3/10/2023) 30 tablet 5    propranoloL (INDERAL [place]) 60 MG 24 hr capsule Take 1 capsule (60 mg total) by mouth once daily. 90 capsule 3    topiramate (TOPAMAX) 25 MG tablet Take 2 tablets (50 mg total) by mouth once daily. 180 tablet 3     No current facility-administered medications for this visit.       ALLERGIES:  Review of patient's allergies indicates:  No Known Allergies    FAMILY HISTORY:  Family History   Problem Relation Age of Onset    Hypertension Mother     Cataracts Mother     Hypertension Father     Cancer Maternal Aunt         gallbladder    Diabetes Maternal Grandfather     Hypertension Maternal Grandfather     Hypertension Paternal Grandfather     Cirrhosis Paternal Grandmother     Heart disease Neg Hx     Glaucoma Neg Hx     Macular degeneration Neg Hx        SOCIAL HISTORY:  Social History     Tobacco Use    Smoking status: Never    Smokeless tobacco: Never   Substance Use Topics    Alcohol use: No    Drug use: No       Review of Systems:  Gen: no fever, no chills, no generalized feeling of weakness   HEENT: no double vision, no blurred vision, no eye pain, no eye exudates.  "no nasal congestion,   no traumatic injury of head, no neck pain, no neck stiffness. no photophobia or phonophobia at this time. ?    Heart: no chest pain, no SOB    Lungs: no SOB, no cough    MSK: no weakness of legs, intact ROM    ABD: no abd pain, no N/V/D/C, no difficulty with defecation.    Extremities: No leg pain, no edema.       Objective:     Vitals:    03/10/23 1014   BP: 121/75   BP Location: Right arm   Patient Position: Sitting   Pulse: 61   Weight: (!) 151.8 kg (334 lb 8.8 oz)   Height: 6' 4" (1.93 m)       General: male in NAD, alert and awake, Aox3, well groomed. ?    ? ?    HEENT: Head is NC/AT EOMI, pupil size: 4 mm B/L, no nystagmus noted; hearing grossly intact b/l.      Neck: Supple. no nuchal rigidity.      Cardiovascular: well perfused, no cyanosis        Respiratory: Symmetric chest rise noted       Musculoskeletal: Muscle tone noted to be adequate for patient age, muscle mass is WNL. No spontaneous movements or fasciculations noted during this examination.       Extremities: No pedal edema or calf tenderness. No cogwheel rigidity noted on B/L UE extremities.       Neurological Examination.    Mental status: AA&O x3; Affect/mood is euthymic/congruent; no aphasia noted during examination. Patient answers simple questions appropriately & follows simple commands; no dysarthria or expressive aphasia; no lyle-neglect or extinction. Vocabulary/word finding: excellent.       Cranial Nerves: II-XII grossly intact.      Muscle Function: Tone WNL and Muscle bulk WNL. Symmetric use of bilateral upper and lower extremities against gravity.     Gait: adequate casual gait with stride length and arm swing WNL.     Assessment:   Seun Wynne is a 32 y.o. male presenting for follow up headache. Continue ER propranolol 60 mg daily. Increase topiramate to 50 mg daily  Plan:     Problem List Items Addressed This Visit          Neuro    Chronic migraine without aura without status migrainosus, not " intractable    Relevant Medications    topiramate (TOPAMAX) 25 MG tablet    propranoloL (INDERAL [place]) 60 MG 24 hr capsule         1. Preventive medications:  Failed:   None    Increase topiramate to 50 mg daily    Continue: Propranolol to 60 mg 24 hr capsule daily  Propranolol is a preventive medication for migraine prevention.   Adverse events most commonly observed include low heart rate, low blood pressure and feeling dizzy. Take caution standing up quickly.   We recommend using propranolol as preventive therapy for at least 1 year if you have no significant side effects. We will prescribe the medication until you are on a stable dose, and than the expectation is for you to have the prescription managed by your primary care provider.      2. Acute (abortive) medications- these medications are to be taken only at the onset of severe headache and please limit a total of any combination of these medications to less than 6 days per month on average because they can cause rebound (medication overuse) headaches.   Failed:   none    CONTINUE: Tylenol at a reduced rate  We repeatedly discussed the need for continued reduction of Tylenol use.  Goal less than 10 days per month.    3. Natural approaches to increasing brain energy and serotonin:    SAMe 200 mg a day    Vitamin B2 400 mg daily    Vitamin B12 1000 mcg daily    Getting early morning light to increase natural serotonin, melatonin. Could also consider light therapy box, 10,000 LUX.     4. Headache prevention and acute treatment over-the counter supplements    Boswellia 800 mg 2-3 times per day, example brand Garcia's, natural anti inflammatory herb    Sleep? modulation- melatonin at night 5 to 10 mg 30 minutes prior to sleep    Feverfew Tea 1-3 cups per day. Can get from [employer] or [email or web address]- A Natural anti inflammatory approach that does not cause further sensitization of the system.    Magnesium Citrate 250 mg daily, slowly increase up to 500-1000  mg daily. Side effect may include diarrhea, so we recommend stopping at whatever dose is comfortable for your body without causing this side effect. This supplement can be very helpful for preventing headache, preventing migraine aura, calming nerves, muscles and even mood. Recommend starting slowly, as it can also lead to increased bowel movements or diarrhea.?     Follow up in 6 months or sooner if needed    I spent a total of 25 minutes on the day of the visit. This includes face to face time and non-face to face time preparing to see the patient (eg, review of tests), obtaining and/or reviewing separately obtained history, documenting clinical information in the electronic or other health record, independently interpreting results and communicating results to the patient/family/caregiver, or care coordinator. Tye Hampton DO was available to contact throughout this encounter.    Stephanie Pan PA-C

## 2023-03-12 DIAGNOSIS — R12 HEARTBURN: ICD-10-CM

## 2023-03-12 NOTE — TELEPHONE ENCOUNTER
No new care gaps identified.  [facility] Embedded Care Gaps. Reference number: [id number]. 3/12/2023   12:25:47 PM CDT

## 2023-03-14 RX ORDER — PANTOPRAZOLE SODIUM 40 MG/1
40 TABLET, DELAYED RELEASE ORAL DAILY
Qty: 90 TABLET | Refills: 1 | Status: SHIPPED | OUTPATIENT
Start: 2023-03-14 | End: 2023-08-30

## 2023-03-28 ENCOUNTER — OFFICE VISIT (OUTPATIENT)
Dept: INTERNAL MEDICINE | Facility: CLINIC | Age: 33
End: 2023-03-28
Payer: MEDICAID

## 2023-03-28 ENCOUNTER — LAB VISIT (OUTPATIENT)
Dept: LAB | Facility: HOSPITAL | Age: 33
End: 2023-03-28
Attending: FAMILY MEDICINE
Payer: MEDICAID

## 2023-03-28 VITALS
SYSTOLIC BLOOD PRESSURE: 130 MMHG | BODY MASS INDEX: 38.36 KG/M2 | WEIGHT: 315 LBS | HEART RATE: 48 BPM | HEIGHT: 76 IN | RESPIRATION RATE: 16 BRPM | TEMPERATURE: 97 F | DIASTOLIC BLOOD PRESSURE: 78 MMHG | OXYGEN SATURATION: 99 %

## 2023-03-28 DIAGNOSIS — Z00.01 ENCOUNTER FOR GENERAL ADULT MEDICAL EXAMINATION WITH ABNORMAL FINDINGS: Primary | ICD-10-CM

## 2023-03-28 DIAGNOSIS — G43.709 CHRONIC MIGRAINE WITHOUT AURA WITHOUT STATUS MIGRAINOSUS, NOT INTRACTABLE: ICD-10-CM

## 2023-03-28 DIAGNOSIS — E78.2 MIXED HYPERLIPIDEMIA: ICD-10-CM

## 2023-03-28 DIAGNOSIS — D56.0 ALPHA THALASSEMIA: ICD-10-CM

## 2023-03-28 DIAGNOSIS — R73.03 PREDIABETES: ICD-10-CM

## 2023-03-28 DIAGNOSIS — K76.0 NAFLD (NONALCOHOLIC FATTY LIVER DISEASE): ICD-10-CM

## 2023-03-28 DIAGNOSIS — F84.9 PERVASIVE DEVELOPMENTAL DISORDER: ICD-10-CM

## 2023-03-28 DIAGNOSIS — F40.10 SOCIAL ANXIETY DISORDER: ICD-10-CM

## 2023-03-28 LAB
ALBUMIN SERPL BCP-MCNC: 4.6 G/DL (ref 3.5–5.2)
ALP SERPL-CCNC: 44 U/L (ref 55–135)
ALT SERPL W/O P-5'-P-CCNC: 92 U/L (ref 10–44)
ANION GAP SERPL CALC-SCNC: 13 MMOL/L (ref 8–16)
AST SERPL-CCNC: 54 U/L (ref 10–40)
BILIRUB SERPL-MCNC: 0.5 MG/DL (ref 0.1–1)
BUN SERPL-MCNC: 16 MG/DL (ref 6–20)
CALCIUM SERPL-MCNC: 10.5 MG/DL (ref 8.7–10.5)
CHLORIDE SERPL-SCNC: 105 MMOL/L (ref 95–110)
CO2 SERPL-SCNC: 24 MMOL/L (ref 23–29)
CREAT SERPL-MCNC: 1.1 MG/DL (ref 0.5–1.4)
EST. GFR  (NO RACE VARIABLE): >60 ML/MIN/1.73 M^2
ESTIMATED AVG GLUCOSE: 146 MG/DL (ref 68–131)
GLUCOSE SERPL-MCNC: 97 MG/DL (ref 70–110)
HBA1C MFR BLD: 6.7 % (ref 4–5.6)
POTASSIUM SERPL-SCNC: 4 MMOL/L (ref 3.5–5.1)
PROT SERPL-MCNC: 8 G/DL (ref 6–8.4)
SODIUM SERPL-SCNC: 142 MMOL/L (ref 136–145)

## 2023-03-28 PROCEDURE — 1159F MED LIST DOCD IN RCRD: CPT | Mod: CPTII,,, | Performed by: FAMILY MEDICINE

## 2023-03-28 PROCEDURE — 99999 PR PBB SHADOW E&M-EST. PATIENT-LVL IV: ICD-10-PCS | Mod: PBBFAC,,, | Performed by: FAMILY MEDICINE

## 2023-03-28 PROCEDURE — 1160F RVW MEDS BY RX/DR IN RCRD: CPT | Mod: CPTII,,, | Performed by: FAMILY MEDICINE

## 2023-03-28 PROCEDURE — 36415 COLL VENOUS BLD VENIPUNCTURE: CPT | Mod: PO | Performed by: FAMILY MEDICINE

## 2023-03-28 PROCEDURE — 99395 PREV VISIT EST AGE 18-39: CPT | Mod: S$PBB,,, | Performed by: FAMILY MEDICINE

## 2023-03-28 PROCEDURE — 83036 HEMOGLOBIN GLYCOSYLATED A1C: CPT | Performed by: FAMILY MEDICINE

## 2023-03-28 PROCEDURE — 3008F BODY MASS INDEX DOCD: CPT | Mod: CPTII,,, | Performed by: FAMILY MEDICINE

## 2023-03-28 PROCEDURE — 1160F PR REVIEW ALL MEDS BY PRESCRIBER/CLIN PHARMACIST DOCUMENTED: ICD-10-PCS | Mod: CPTII,,, | Performed by: FAMILY MEDICINE

## 2023-03-28 PROCEDURE — 3078F DIAST BP <80 MM HG: CPT | Mod: CPTII,,, | Performed by: FAMILY MEDICINE

## 2023-03-28 PROCEDURE — 4010F PR ACE/ARB THEARPY RXD/TAKEN: ICD-10-PCS | Mod: CPTII,,, | Performed by: FAMILY MEDICINE

## 2023-03-28 PROCEDURE — 4010F ACE/ARB THERAPY RXD/TAKEN: CPT | Mod: CPTII,,, | Performed by: FAMILY MEDICINE

## 2023-03-28 PROCEDURE — 3075F SYST BP GE 130 - 139MM HG: CPT | Mod: CPTII,,, | Performed by: FAMILY MEDICINE

## 2023-03-28 PROCEDURE — 99214 OFFICE O/P EST MOD 30 MIN: CPT | Mod: PBBFAC,PO | Performed by: FAMILY MEDICINE

## 2023-03-28 PROCEDURE — 3078F PR MOST RECENT DIASTOLIC BLOOD PRESSURE < 80 MM HG: ICD-10-PCS | Mod: CPTII,,, | Performed by: FAMILY MEDICINE

## 2023-03-28 PROCEDURE — 3008F PR BODY MASS INDEX (BMI) DOCUMENTED: ICD-10-PCS | Mod: CPTII,,, | Performed by: FAMILY MEDICINE

## 2023-03-28 PROCEDURE — 99395 PR PREVENTIVE VISIT,EST,18-39: ICD-10-PCS | Mod: S$PBB,,, | Performed by: FAMILY MEDICINE

## 2023-03-28 PROCEDURE — 80053 COMPREHEN METABOLIC PANEL: CPT | Performed by: FAMILY MEDICINE

## 2023-03-28 PROCEDURE — 3075F PR MOST RECENT SYSTOLIC BLOOD PRESS GE 130-139MM HG: ICD-10-PCS | Mod: CPTII,,, | Performed by: FAMILY MEDICINE

## 2023-03-28 PROCEDURE — 1159F PR MEDICATION LIST DOCUMENTED IN MEDICAL RECORD: ICD-10-PCS | Mod: CPTII,,, | Performed by: FAMILY MEDICINE

## 2023-03-28 PROCEDURE — 99999 PR PBB SHADOW E&M-EST. PATIENT-LVL IV: CPT | Mod: PBBFAC,,, | Performed by: FAMILY MEDICINE

## 2023-03-29 ENCOUNTER — PATIENT MESSAGE (OUTPATIENT)
Dept: INTERNAL MEDICINE | Facility: CLINIC | Age: 33
End: 2023-03-29
Payer: MEDICAID

## 2023-03-29 DIAGNOSIS — E66.9 TYPE 2 DIABETES MELLITUS WITH OBESITY: Primary | ICD-10-CM

## 2023-03-29 DIAGNOSIS — E11.69 TYPE 2 DIABETES MELLITUS WITH OBESITY: Primary | ICD-10-CM

## 2023-03-29 RX ORDER — METFORMIN HYDROCHLORIDE 500 MG/1
500 TABLET, EXTENDED RELEASE ORAL 2 TIMES DAILY WITH MEALS
Qty: 60 TABLET | Refills: 11 | Status: SHIPPED | OUTPATIENT
Start: 2023-03-29 | End: 2023-05-17

## 2023-03-29 NOTE — TELEPHONE ENCOUNTER
Please schedule a repeat CMP and A1c in 3 months.  Also schedule the patient with diabetic education.  Thank you.

## 2023-03-29 NOTE — TELEPHONE ENCOUNTER
Repeat lab apt scheduled an apt reminder mailed    Message sent to referral coordinator to schedule  Diabetic education    Pt aware he will be called to schedule this apt

## 2023-04-13 ENCOUNTER — CLINICAL SUPPORT (OUTPATIENT)
Dept: DIABETES | Facility: CLINIC | Age: 33
End: 2023-04-13
Payer: MEDICAID

## 2023-04-13 DIAGNOSIS — E11.69 TYPE 2 DIABETES MELLITUS WITH OBESITY: ICD-10-CM

## 2023-04-13 DIAGNOSIS — E66.9 TYPE 2 DIABETES MELLITUS WITH OBESITY: ICD-10-CM

## 2023-04-13 PROCEDURE — G0108 DIAB MANAGE TRN  PER INDIV: HCPCS | Mod: PBBFAC,PO | Performed by: DIETITIAN, REGISTERED

## 2023-04-13 NOTE — PROGRESS NOTES
Diabetes Care Specialist Progress Note  Author: Judith Graham RD  Date: 4/13/2023    Program Intake  Reason for Diabetes Program Visit:: Initial Diabetes Assessment  Current diabetes risk level:: low  In the last 12 months, have you:: none  Permission to speak with others about care:: yes (Mother)    Lab Results   Component Value Date    HGBA1C 6.7 (H) 03/28/2023       Clinical  Problem Review  Reviewed Problem List with Patient: yes  Active comorbidities affecting diabetes self-care.: yes  Comorbidities: Hypertension (HLD)    Clinical Assessment  Current Diabetes Treatment: Oral Medication  Have you ever experienced hypoglycemia (low blood sugar)?: no  Have you ever experienced hyperglycemia (high blood sugar)?: no    Medication Information  How do you obtain your medications?: Family picks up  How many days a week do you miss your medications?: Never  Do you sometimes have difficulty refilling your medications?: No  Medication adherence impacting ability to self-manage diabetes?: No    Labs  Do you have regular lab work to monitor your medications?: Yes  Type of Regular Lab Work: A1c  Lab Compliance Barriers: No    Nutritional Status  Diet: Regular  Meal Plan 24 Hour Recall: Breakfast, Lunch, Dinner, Snack  Meal Plan 24 Hour Recall - Breakfast: ribs, toast  Meal Plan 24 Hour Recall - Lunch: sandwich  Meal Plan 24 Hour Recall - Dinner: protein, starch  Meal Plan 24 Hour Recall - Snack: no snacking; drinks mostly diet drinks  Change in appetite?: No  Dentation:: Intact  Recent Changes in Weight: No Recent Weight Change  Current nutritional status an area of need that is impacting patient's ability to self-manage diabetes?: Yes    Additional Social History    Support  Does anyone support you with your diabetes care?: yes  Who supports you?: parent, self  Who takes you to your medical appointments?: parent  Does the current support meet the patient's needs?: Yes  Is Support an area impacting ability to self-manage  diabetes?: No    Access to Mass Media & Technology  Does the patient have access to any of the following devices or technologies?: Smart phone (through his mom)  Media or technology needs impacting ability to self-manage diabetes?: No    Cognitive/Behavioral Health  Alert and Oriented: Yes  Difficulty Thinking: No  Requires Prompting: No  Requires assistance for routine expression?: No  Cognitive or behavioral barriers impacting ability to self-manage diabetes?: Yes (mild intellectual disability)    Culture/Scientology  Culture or Roman Catholic beliefs that may impact ability to access healthcare: No    Communication  Language preference: English  Hearing Problems: No  Vision Problems: No  Communication needs impacting ability to self-manage diabetes?: No    Health Literacy  Preferred Learning Method: Face to Face  How often do you need to have someone help you read instructions, pamphlets, or written material from your doctor or pharmacy?: Often  Health literacy needs impacting ability to self-manage diabetes?: No      Diabetes Self-Management Skills Assessment    Diabetes Disease Process/Treatment Options  Patient/caregiver able to state what happens when someone has diabetes.: no  Patient/caregiver knows what type of diabetes they have.: no  Patient/caregiver able to identify at least three signs and symptoms of diabetes.: no  Patient able to identify at least three risk factors for diabetes.: no  Diabetes Disease Process/Treatment Options: Skills Assessment Completed: No  Area of need?: Yes    Nutrition/Healthy Eating  Challenges to healthy eating:: portion control  Method of carbohydrate measurement:: no method  Patient can identify foods that impact blood sugar.: no (see comments)  Nutrition/Healthy Eating Skills Assessment Completed:: Yes  Assessment indicates:: Instruction Needed  Area of need?: Yes    Physical Activity/Exercise  Patient's daily activity level:: sedentary  Patient formally exercises outside of  work.: no  Reasons for not exercising:: other (see comments) (he needs supervision)  Patient can identify forms of physical activity.: no  Patient can identify reasons why exercise/physical activity is important in diabetes management.: no  Physical Activity/Exercise Skills Assessment Completed: : Yes  Assessment indicates:: Instruction Needed  Area of need?: Yes    Medications  Patient is able to describe current diabetes management routine.: no  Patient is able to identify current diabetes medications, dosages, and appropriate timing of medications.: no  Patient understands the purpose of the medications taken for diabetes.: no  Patient reports problems or concerns with current medication regimen.: no  Medication Skills Assessment Completed:: Yes  Assessment indicates:: Instruction Needed  Area of need?: Yes    Home Blood Glucose Monitoring  Patient states that blood sugar is checked at home daily.: no  Reasons for not monitoring:: new diabetes diagnosis  Home Blood Glucose Monitoring Skills Assessment Completed: : Yes  Assessment indicates:: Instruction Needed  Area of need?: No    Acute Complications  Acute Complications Skills Assessment Completed: : No  Deffered due to:: Time    Chronic Complications  Chronic Complications Skills Assessment Completed: : No  Deferred due to:: Time    Psychosocial/Coping  Psychosocial/Coping Skills Assessment Completed: : No  Deffered due to:: Time       Assessment Summary and Plan    Based on today's diabetes care assessment, the following areas of need were identified:      Social 4/13/2023   Support No   Access to Mass Media/Tech No   Cognitive/Behavioral Health Yes   Culture/[employer] No   Communication No   Health Literacy No        Clinical 4/13/2023   Medication Adherence No   Lab Compliance No   Nutritional Status Yes, see care plan        Diabetes Self-Management Skills 4/13/2023   Diabetes Disease Process/Treatment Options Yes, taught mom pathos and phys of DM    Nutrition/Healthy Eating Yes, see care plan   Physical Activity/Exercise Yes, see care plan   Medication Yes, taught MOA of metformin to mom   Home Blood Glucose Monitoring No          Today's interventions were provided through individual discussion, instruction, and written materials were provided.      Patient verbalized understanding of instruction and written materials.  Pt was able to return back demonstration of instructions today. Patient understood key points, needs reinforcement and further instruction.     Diabetes Self-Management Care Plan:    Today's Diabetes Self-Management Care Plan was developed with Seun's input. Seun has agreed to work toward the following goal(s) to improve his/her overall diabetes control.      Care Plan: Diabetes Management   Updates made since 3/14/2023 12:00 AM        Problem: Healthy Eating         Goal: Eat 3 meals daily with 45-60g/3-4 servings of Carbohydrate per meal.    Start Date: 4/13/2023   Expected End Date: 4/30/2024   Priority: High   Barriers: No Barriers Identified   Note:    Pt has an intellectual disability. He was accompanied by his mother who is a very ned woman. He does not eat vegetables. Only fruit he likes is bananas. Eats fast food 1x/week. He mainly eats protein and white bread.His diet has very little fiber. He sometimes drinks regular soda if they are in the house. His mom will add in Metamucil capsules with each meal.        Task: Reviewed the sources and role of Carbohydrate, Protein, and Fat and how each nutrient impacts blood sugar. Completed 4/13/2023     Task: Explained how to count carbohydrates using the food label and the use of dry measuring cups for accurate carb counting. Completed 4/13/2023     Task: Recommended replacing beverages containing high sugar content with noncaloric/sugar free options and/or water. Completed 4/13/2023        Task: Review the importance of balancing carbohydrates with each meal using portion control  techniques to count servings of carbohydrate and label reading to identify serving size and amount of total carbs per serving. Completed 4/13/2023        Task: Provided Sample plate method and reviewed the use of the plate to estimate amounts of carbohydrate per meal. Completed 4/13/2023        Problem: Physical Activity and Exercise         Goal: Patient agrees to increase physical activity to a goal of 3 times per week for 20-30 minutes.    Start Date: 4/13/2023   Expected End Date: 4/30/2024   Priority: Medium   Barriers: No Barriers Identified   Note:    Patient's mom will try to get him to walk at least 3 times/week. He is very sedentary.        Task: Discussed role of physical activity on reducing insulin resistance and improvement in overall glycemic control. Completed 4/13/2023         Follow Up Plan     Follow up in about 3 months (around 7/13/2023). Mom declined to schedule a follow up appt. If needed, review carb counting and meal planning, assess activity level; discuss long-term mgmt.    Today's care plan and follow up schedule was discussed with patient.  Seun verbalized understanding of the care plan, goals, and agrees to follow up plan.        The patient was encouraged to communicate with his/her health care provider/physician and care team regarding his/her condition(s) and treatment.  I provided the patient with my contact information today and encouraged to contact me via phone or [facility] Patient Portal as needed.     Length of Visit   Total Time: 60 Minutes

## 2023-04-24 ENCOUNTER — TELEPHONE (OUTPATIENT)
Dept: INTERNAL MEDICINE | Facility: CLINIC | Age: 33
End: 2023-04-24
Payer: MEDICAID

## 2023-04-24 NOTE — TELEPHONE ENCOUNTER
----- Message from Mitzi Montgomery sent at 4/24/2023 12:34 PM CDT -----  Contact: [phone]  Pt Mom Hay Snell is calling in regards to getting appointment for her son , Seun for diabetes management with you,       Please call and advise @ # [phone]

## 2023-05-17 ENCOUNTER — OFFICE VISIT (OUTPATIENT)
Dept: INTERNAL MEDICINE | Facility: CLINIC | Age: 33
End: 2023-05-17
Payer: MEDICAID

## 2023-05-17 VITALS
HEART RATE: 56 BPM | BODY MASS INDEX: 38.36 KG/M2 | OXYGEN SATURATION: 97 % | SYSTOLIC BLOOD PRESSURE: 118 MMHG | HEIGHT: 76 IN | TEMPERATURE: 98 F | RESPIRATION RATE: 16 BRPM | DIASTOLIC BLOOD PRESSURE: 80 MMHG | WEIGHT: 315 LBS

## 2023-05-17 DIAGNOSIS — E78.2 MIXED HYPERLIPIDEMIA: ICD-10-CM

## 2023-05-17 DIAGNOSIS — E66.9 TYPE 2 DIABETES MELLITUS WITH OBESITY: Primary | ICD-10-CM

## 2023-05-17 DIAGNOSIS — F70 MILD INTELLECTUAL DISABILITY: ICD-10-CM

## 2023-05-17 DIAGNOSIS — E11.69 TYPE 2 DIABETES MELLITUS WITH OBESITY: Primary | ICD-10-CM

## 2023-05-17 DIAGNOSIS — F84.9 PERVASIVE DEVELOPMENTAL DISORDER: ICD-10-CM

## 2023-05-17 LAB — GLUCOSE SERPL-MCNC: 96 MG/DL (ref 70–110)

## 2023-05-17 PROCEDURE — 82962 GLUCOSE BLOOD TEST: CPT | Mod: PBBFAC,PO | Performed by: NURSE PRACTITIONER

## 2023-05-17 PROCEDURE — 4010F PR ACE/ARB THEARPY RXD/TAKEN: ICD-10-PCS | Mod: CPTII,,, | Performed by: NURSE PRACTITIONER

## 2023-05-17 PROCEDURE — 99214 OFFICE O/P EST MOD 30 MIN: CPT | Mod: PBBFAC,PO | Performed by: NURSE PRACTITIONER

## 2023-05-17 PROCEDURE — 1160F PR REVIEW ALL MEDS BY PRESCRIBER/CLIN PHARMACIST DOCUMENTED: ICD-10-PCS | Mod: CPTII,,, | Performed by: NURSE PRACTITIONER

## 2023-05-17 PROCEDURE — 1159F MED LIST DOCD IN RCRD: CPT | Mod: CPTII,,, | Performed by: NURSE PRACTITIONER

## 2023-05-17 PROCEDURE — 3044F HG A1C LEVEL LT 7.0%: CPT | Mod: CPTII,,, | Performed by: NURSE PRACTITIONER

## 2023-05-17 PROCEDURE — 99215 PR OFFICE/OUTPT VISIT, EST, LEVL V, 40-54 MIN: ICD-10-PCS | Mod: S$PBB,,, | Performed by: NURSE PRACTITIONER

## 2023-05-17 PROCEDURE — 1159F PR MEDICATION LIST DOCUMENTED IN MEDICAL RECORD: ICD-10-PCS | Mod: CPTII,,, | Performed by: NURSE PRACTITIONER

## 2023-05-17 PROCEDURE — 99999 PR PBB SHADOW E&M-EST. PATIENT-LVL IV: ICD-10-PCS | Mod: PBBFAC,,, | Performed by: NURSE PRACTITIONER

## 2023-05-17 PROCEDURE — 3074F PR MOST RECENT SYSTOLIC BLOOD PRESSURE < 130 MM HG: ICD-10-PCS | Mod: CPTII,,, | Performed by: NURSE PRACTITIONER

## 2023-05-17 PROCEDURE — 3008F PR BODY MASS INDEX (BMI) DOCUMENTED: ICD-10-PCS | Mod: CPTII,,, | Performed by: NURSE PRACTITIONER

## 2023-05-17 PROCEDURE — 3079F DIAST BP 80-89 MM HG: CPT | Mod: CPTII,,, | Performed by: NURSE PRACTITIONER

## 2023-05-17 PROCEDURE — 4010F ACE/ARB THERAPY RXD/TAKEN: CPT | Mod: CPTII,,, | Performed by: NURSE PRACTITIONER

## 2023-05-17 PROCEDURE — 3074F SYST BP LT 130 MM HG: CPT | Mod: CPTII,,, | Performed by: NURSE PRACTITIONER

## 2023-05-17 PROCEDURE — 3008F BODY MASS INDEX DOCD: CPT | Mod: CPTII,,, | Performed by: NURSE PRACTITIONER

## 2023-05-17 PROCEDURE — 99999 PR PBB SHADOW E&M-EST. PATIENT-LVL IV: CPT | Mod: PBBFAC,,, | Performed by: NURSE PRACTITIONER

## 2023-05-17 PROCEDURE — 3044F PR MOST RECENT HEMOGLOBIN A1C LEVEL <7.0%: ICD-10-PCS | Mod: CPTII,,, | Performed by: NURSE PRACTITIONER

## 2023-05-17 PROCEDURE — 1160F RVW MEDS BY RX/DR IN RCRD: CPT | Mod: CPTII,,, | Performed by: NURSE PRACTITIONER

## 2023-05-17 PROCEDURE — 99215 OFFICE O/P EST HI 40 MIN: CPT | Mod: S$PBB,,, | Performed by: NURSE PRACTITIONER

## 2023-05-17 PROCEDURE — 3079F PR MOST RECENT DIASTOLIC BLOOD PRESSURE 80-89 MM HG: ICD-10-PCS | Mod: CPTII,,, | Performed by: NURSE PRACTITIONER

## 2023-05-17 RX ORDER — SEMAGLUTIDE 0.68 MG/ML
0.5 INJECTION, SOLUTION SUBCUTANEOUS
Qty: 1 EACH | Refills: 3 | Status: SHIPPED | OUTPATIENT
Start: 2023-05-17 | End: 2023-09-20 | Stop reason: SDUPTHER

## 2023-05-17 RX ORDER — ONDANSETRON 8 MG/1
8 TABLET, ORALLY DISINTEGRATING ORAL EVERY 12 HOURS PRN
Qty: 30 TABLET | Refills: 0 | Status: SHIPPED | OUTPATIENT
Start: 2023-05-17

## 2023-05-17 NOTE — PATIENT INSTRUCTIONS
Start ozempic injection one time per week -   Give 0.25mg every week for the first 2 to 4 weeks, then you can increase your dose to the 0.5mg dose every week and stay on this dose.   Give on the same day of the week.   Rotate your injection sites -     Notify me if any abdominal pain, nausea or vomiting -

## 2023-05-17 NOTE — PROGRESS NOTES
"HPI: Seun Wynne is a 32 y.o.  male c/I for visit to address Diabetes Type 2  This is the first time I am seeing  him for care, follows with Dr. Karla Arce MD for primary care needs.   Has not seen endocrinology or diabetes specialist in the past.     was diagnosed with T2DM this past year.   Mom also has diabetes T2dm and I see her as well.   Has never been hospitalized r/t DM.  He did try metformin for about 1 week and had severe stomach upset and diarrhea, so stopped taking it.   Is not checking blood sugars.   Admits not following ADA diet -   Likes to snack.   Has been overweight his entire life, even as a young child.     Complications from diabetes and pertinent co-morbidities include:   Negative for DKA.   Has never taken insulin in life.   -negative for diabetic neuropathy.   -negative for nephropathy.   No microalbuminuria.   Eyes:  negative for Diabetic retinopathy   CV: Denies history of MI nor stroke.   CAD: Denies.  Takes aspirin 81mg tablet daily  BP: has history of HTN  Statin: Taking  ACE/ARB: Taking    Social History     Tobacco Use   Smoking Status Never   Smokeless Tobacco Never        Past medical History:   Past Medical History:   Diagnosis Date    Asperger syndrome     Diabetes mellitus, type 2     "under control after weight loss"    Hyperlipidemia     Hypertension     "BEEN FINE IN RECENT YRS NEVER TOOK MEDS"    Obesity       Family hx:   Family History   Problem Relation Age of Onset    Hypertension Mother     Cataracts Mother     Hypertension Father     Cancer Maternal Aunt         gallbladder    Diabetes Maternal Grandfather     Hypertension Maternal Grandfather     Hypertension Paternal Grandfather     Cirrhosis Paternal Grandmother     Heart disease Neg Hx     Glaucoma Neg Hx     Macular degeneration Neg Hx       Current meds:   Current Outpatient Medications:     cholecalciferol, vitamin D3, (VITAMIN D3) 50 mcg (2,000 unit) Cap, Take 1 capsule by mouth once daily., Disp: " , Rfl:     desmopressin (DDAVP) 0.2 MG tablet, Take 1 tablet (200 mcg total) by mouth once daily., Disp: 30 tablet, Rfl: 5    EScitalopram oxalate (LEXAPRO) 20 MG tablet, Take 1 tablet (20 mg total) by mouth once daily., Disp: 30 tablet, Rfl: 5    losartan (COZAAR) 25 MG tablet, Take 1 tablet (25 mg total) by mouth once daily., Disp: 90 tablet, Rfl: 1    multivitamin capsule, Take 1 capsule by mouth once daily., Disp: , Rfl:     pantoprazole (PROTONIX) 40 MG tablet, Take 1 tablet (40 mg total) by mouth once daily., Disp: 90 tablet, Rfl: 1    propranoloL (INDERAL LA) 60 MG 24 hr capsule, Take 1 capsule (60 mg total) by mouth once daily., Disp: 90 capsule, Rfl: 3    rosuvastatin (CRESTOR) 40 MG Tab, Take 1 tablet (40 mg total) by mouth once daily., Disp: 90 tablet, Rfl: 3    topiramate (TOPAMAX) 25 MG tablet, Take 2 tablets (50 mg total) by mouth once daily., Disp: 180 tablet, Rfl: 3    ondansetron (ZOFRAN-ODT) 8 MG TbDL, Take 1 tablet (8 mg total) by mouth every 12 (twelve) hours as needed (nausea or vomiting)., Disp: 30 tablet, Rfl: 0    semaglutide (OZEMPIC) 0.25 mg or 0.5 mg (2 mg/3 mL) pen injector, Inject 0.5 mg into the skin every 7 days. (Patient will begin with 0.25mg every week for the first 2 weeks, then increase dose to 0.5mg thereafter and stay on this dose)., Disp: 1 each, Rfl: 3     Current Diabetes medications:   None    Medications Tried and Failed:   Metformin - GI upset so stopped taking.     Social:   Lives at home with: mother.   Life changes/stressors currently:   Diet: following ADA diet   Meals: 3 per day and snacks.        Breakfast - leftover from dinner - meats/potatoes/rice/bread       Lunch - sandwich       Dinner - meats/home cooked       Snacks - chips/cookies.         Drinks - sugar free body armour, sugar free sodas.  Exercise: none.   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     Glucose Monitoring:   Not checking blood sugar -     Standards of care:   Eyes:  ".Most Recent Eye Exam Date: Not Found  Foot exam: Most Recent Foot Exam Date: Not Found   Diabetes education: None.    Vital Signs  /80 (BP Location: Right arm, Patient Position: Sitting, BP Method: Large (Manual))   Pulse (!) 56   Temp 97.7 °F (36.5 °C) (Temporal)   Resp 16   Ht 6' 4" (1.93 m)   Wt (!) 149.1 kg (328 lb 11.3 oz)   SpO2 97%   BMI 40.01 kg/m²     Pertinent Labs:   Hgba1c   Lab Results   Component Value Date    HGBA1C 6.7 (H) 03/28/2023    HGBA1C 6.3 (H) 08/08/2022    HGBA1C 6.1 (H) 12/23/2021     Lipid panel   Lab Results   Component Value Date    CHOL 211 (H) 11/18/2022    CHOL 206 (H) 08/08/2022    CHOL 231 (H) 12/23/2021     Lab Results   Component Value Date    HDL 46 11/18/2022    HDL 44 08/08/2022    HDL 42 12/23/2021     Lab Results   Component Value Date    LDLCALC 117.0 11/18/2022    LDLCALC 124.0 08/08/2022    LDLCALC 142.4 12/23/2021     Lab Results   Component Value Date    TRIG 240 (H) 11/18/2022    TRIG 190 (H) 08/08/2022    TRIG 233 (H) 12/23/2021     Lab Results   Component Value Date    CHOLHDL 21.8 11/18/2022    CHOLHDL 21.4 08/08/2022    CHOLHDL 18.2 (L) 12/23/2021      CMP  Glucose   Date Value Ref Range Status   03/28/2023 97 70 - 110 mg/dL Final     BUN   Date Value Ref Range Status   03/28/2023 16 6 - 20 mg/dL Final     Creatinine   Date Value Ref Range Status   03/28/2023 1.1 0.5 - 1.4 mg/dL Final     eGFR if    Date Value Ref Range Status   12/23/2021 >60 >60 mL/min/1.73 m^2 Final     eGFR if non    Date Value Ref Range Status   12/23/2021 >60 >60 mL/min/1.73 m^2 Final     Comment:     Calculation used to obtain the estimated glomerular filtration  rate (eGFR) is the CKD-EPI equation.        AST   Date Value Ref Range Status   03/28/2023 54 (H) 10 - 40 U/L Final     ALT   Date Value Ref Range Status   03/28/2023 92 (H) 10 - 44 U/L Final     Microalbumin creatinine ratio:   Lab Results   Component Value Date    MICALBCREAT 3.9 " 10/28/2010       Review Of Systems:   Gen: Appetite good, + weight gain, denies fatigue and weakness. + polydipsia.  Skin: Skin is intact and heals well, denies any rashes or hair changes.   EENT: Denies any acute visual disturbances, nor blurred vision.    Resp: Denies SOB or Dyspnea on exertion, denies cough.   Cardiac: Denies chest pain, palpitations, or swelling.   GI: Denies abdominal pain, nausea or vomiting, diarrhea, or constipation.   /GYN: Denies nocturia, nor burning, frequency or pain on urination.  MS/Neuro: Denies numbness/ tingling in BLE; Gait steady, speech clear  Psych: Denies drug/ETOH abuse, no hx of depression.  Other systems: negative.    Physical Exam:   GENERAL: Well developed, well nourished in appearance.   PSYCH: AAOx3, appropriate mood and affect, pleasant expression, conversant, appears relaxed, well groomed.   EYES: PERRL, Conjunctiva and corneas clear  NECK: Soft and Supple, trachea midline, No thyroid enlargement noted.  CHEST: Even, regular, and unlabored respirations  ABDOMEN: Soft, non-tender, non-distended.   VASCULAR: pedal pulses palpable bilaterally, no edema.  NEURO:  cranial nerves II - XII intact   MUSCULOSKELETAL: Good ROM, equal strength, equal hand grasp, steady gait.   SKIN: Skin warm, dry, and intact - no acanthosis nigricans.     Assessment and Plan of Care:     Seun was seen today for establish care.    Diagnoses and all orders for this visit:    Type 2 diabetes mellitus with obesity  -     POCT Glucose, Hand-Held Device  -     semaglutide (OZEMPIC) 0.25 mg or 0.5 mg (2 mg/3 mL) pen injector; Inject 0.5 mg into the skin every 7 days. (Patient will begin with 0.25mg every week for the first 2 weeks, then increase dose to 0.5mg thereafter and stay on this dose).  -     Hemoglobin A1C; Future  -     Microalbumin/Creatinine Ratio, Urine; Future  -     Comprehensive Metabolic Panel; Future  -     ondansetron (ZOFRAN-ODT) 8 MG TbDL; Take 1 tablet (8 mg total) by mouth  every 12 (twelve) hours as needed (nausea or vomiting).    Mixed hyperlipidemia  -     Lipid Panel; Future    Mild intellectual disability    Pervasive developmental disorder    BMI 38.0-38.9,adult         1. T2DM with hyperglycemia- Hgba1c goal is 7.5% or less without hypoglycemia - 6.1%--> 6.3%--> 6.7%   discussed DM, progression of disease, long term complications, CV risk factors and tx options.   Advise compliance with ADA diet and encourage exercise  Intolerant to metformin. Start Ozempic 0.25 mg SC once weekly x 2 - 4 weeks.  At week 5 increase to 0.5 mg weekly and stay at this dose.   No known history of pancreatitis or medullary thyroid cancer.   If you experience severe abdominal pain, nausea, or diarrhea - please call me or notify my office immediately.    Reviewed  hypoglycemia, s/s and appropriate tx. Have/get quick acting glucose tablets at hand.   Instructed to monitor Blood glucose 2 - 4x/day and bring meter/ log to every clinic visit.     2. HTN- controlled, continue meds as previously prescribed and monitor.   No urine mac - none checked recent.     3. Lipids- LDL goal < 100. Not at goal  Currently on statin therapy- recheck lipid panel next week.     4. Weight - BMI Body mass index is 40.01 kg/m².   Encourage Ada diet and exercise.     5. Renal Function - stable, no nephropathy.          Follow up in  6 weeks with OV and labs prior.

## 2023-05-18 ENCOUNTER — PATIENT MESSAGE (OUTPATIENT)
Dept: PSYCHIATRY | Facility: CLINIC | Age: 33
End: 2023-05-18
Payer: MEDICAID

## 2023-05-22 DIAGNOSIS — F84.0 AUTISM SPECTRUM DISORDER: ICD-10-CM

## 2023-05-22 RX ORDER — ESCITALOPRAM OXALATE 20 MG/1
20 TABLET ORAL DAILY
Qty: 30 TABLET | Refills: 5 | Status: SHIPPED | OUTPATIENT
Start: 2023-05-22 | End: 2023-11-16 | Stop reason: SDUPTHER

## 2023-06-22 ENCOUNTER — LAB VISIT (OUTPATIENT)
Dept: LAB | Facility: HOSPITAL | Age: 33
End: 2023-06-22
Attending: NURSE PRACTITIONER
Payer: MEDICAID

## 2023-06-22 DIAGNOSIS — E11.69 TYPE 2 DIABETES MELLITUS WITH OBESITY: ICD-10-CM

## 2023-06-22 DIAGNOSIS — E78.2 MIXED HYPERLIPIDEMIA: ICD-10-CM

## 2023-06-22 DIAGNOSIS — E66.9 TYPE 2 DIABETES MELLITUS WITH OBESITY: ICD-10-CM

## 2023-06-22 LAB
ALBUMIN SERPL BCP-MCNC: 4.6 G/DL (ref 3.5–5.2)
ALP SERPL-CCNC: 41 U/L (ref 55–135)
ALT SERPL W/O P-5'-P-CCNC: 95 U/L (ref 10–44)
ANION GAP SERPL CALC-SCNC: 11 MMOL/L (ref 8–16)
AST SERPL-CCNC: 68 U/L (ref 10–40)
BILIRUB SERPL-MCNC: 0.5 MG/DL (ref 0.1–1)
BUN SERPL-MCNC: 13 MG/DL (ref 6–20)
CALCIUM SERPL-MCNC: 9.8 MG/DL (ref 8.7–10.5)
CHLORIDE SERPL-SCNC: 104 MMOL/L (ref 95–110)
CHOLEST SERPL-MCNC: 181 MG/DL (ref 120–199)
CHOLEST/HDLC SERPL: 3.9 {RATIO} (ref 2–5)
CO2 SERPL-SCNC: 24 MMOL/L (ref 23–29)
CREAT SERPL-MCNC: 1 MG/DL (ref 0.5–1.4)
EST. GFR  (NO RACE VARIABLE): >60 ML/MIN/1.73 M^2
ESTIMATED AVG GLUCOSE: 128 MG/DL (ref 68–131)
GLUCOSE SERPL-MCNC: 76 MG/DL (ref 70–110)
HBA1C MFR BLD: 6.1 % (ref 4–5.6)
HDLC SERPL-MCNC: 47 MG/DL (ref 40–75)
HDLC SERPL: 26 % (ref 20–50)
LDLC SERPL CALC-MCNC: 107.6 MG/DL (ref 63–159)
NONHDLC SERPL-MCNC: 134 MG/DL
POTASSIUM SERPL-SCNC: 3.8 MMOL/L (ref 3.5–5.1)
PROT SERPL-MCNC: 8 G/DL (ref 6–8.4)
SODIUM SERPL-SCNC: 139 MMOL/L (ref 136–145)
TRIGL SERPL-MCNC: 132 MG/DL (ref 30–150)

## 2023-06-22 PROCEDURE — 80053 COMPREHEN METABOLIC PANEL: CPT | Performed by: NURSE PRACTITIONER

## 2023-06-22 PROCEDURE — 83036 HEMOGLOBIN GLYCOSYLATED A1C: CPT | Performed by: NURSE PRACTITIONER

## 2023-06-22 PROCEDURE — 80061 LIPID PANEL: CPT | Performed by: NURSE PRACTITIONER

## 2023-06-22 PROCEDURE — 36415 COLL VENOUS BLD VENIPUNCTURE: CPT | Mod: PO | Performed by: NURSE PRACTITIONER

## 2023-06-28 ENCOUNTER — OFFICE VISIT (OUTPATIENT)
Dept: INTERNAL MEDICINE | Facility: CLINIC | Age: 33
End: 2023-06-28
Payer: MEDICAID

## 2023-06-28 VITALS
WEIGHT: 315 LBS | SYSTOLIC BLOOD PRESSURE: 132 MMHG | TEMPERATURE: 98 F | HEART RATE: 62 BPM | RESPIRATION RATE: 15 BRPM | OXYGEN SATURATION: 97 % | HEIGHT: 76 IN | BODY MASS INDEX: 38.36 KG/M2 | DIASTOLIC BLOOD PRESSURE: 80 MMHG

## 2023-06-28 DIAGNOSIS — E78.2 MIXED HYPERLIPIDEMIA: ICD-10-CM

## 2023-06-28 DIAGNOSIS — G43.709 CHRONIC MIGRAINE WITHOUT AURA WITHOUT STATUS MIGRAINOSUS, NOT INTRACTABLE: ICD-10-CM

## 2023-06-28 DIAGNOSIS — F70 MILD INTELLECTUAL DISABILITY: ICD-10-CM

## 2023-06-28 DIAGNOSIS — K76.0 NAFLD (NONALCOHOLIC FATTY LIVER DISEASE): ICD-10-CM

## 2023-06-28 DIAGNOSIS — F84.9 PERVASIVE DEVELOPMENTAL DISORDER: ICD-10-CM

## 2023-06-28 DIAGNOSIS — E11.69 TYPE 2 DIABETES MELLITUS WITH OBESITY: Primary | ICD-10-CM

## 2023-06-28 DIAGNOSIS — E66.9 TYPE 2 DIABETES MELLITUS WITH OBESITY: Primary | ICD-10-CM

## 2023-06-28 PROCEDURE — 3079F DIAST BP 80-89 MM HG: CPT | Mod: CPTII,,, | Performed by: NURSE PRACTITIONER

## 2023-06-28 PROCEDURE — 4010F PR ACE/ARB THEARPY RXD/TAKEN: ICD-10-PCS | Mod: CPTII,,, | Performed by: NURSE PRACTITIONER

## 2023-06-28 PROCEDURE — 3066F NEPHROPATHY DOC TX: CPT | Mod: CPTII,,, | Performed by: NURSE PRACTITIONER

## 2023-06-28 PROCEDURE — 1159F PR MEDICATION LIST DOCUMENTED IN MEDICAL RECORD: ICD-10-PCS | Mod: CPTII,,, | Performed by: NURSE PRACTITIONER

## 2023-06-28 PROCEDURE — 3008F PR BODY MASS INDEX (BMI) DOCUMENTED: ICD-10-PCS | Mod: CPTII,,, | Performed by: NURSE PRACTITIONER

## 2023-06-28 PROCEDURE — 3066F PR DOCUMENTATION OF TREATMENT FOR NEPHROPATHY: ICD-10-PCS | Mod: CPTII,,, | Performed by: NURSE PRACTITIONER

## 2023-06-28 PROCEDURE — 99214 PR OFFICE/OUTPT VISIT, EST, LEVL IV, 30-39 MIN: ICD-10-PCS | Mod: S$PBB,,, | Performed by: NURSE PRACTITIONER

## 2023-06-28 PROCEDURE — 3044F PR MOST RECENT HEMOGLOBIN A1C LEVEL <7.0%: ICD-10-PCS | Mod: CPTII,,, | Performed by: NURSE PRACTITIONER

## 2023-06-28 PROCEDURE — 4010F ACE/ARB THERAPY RXD/TAKEN: CPT | Mod: CPTII,,, | Performed by: NURSE PRACTITIONER

## 2023-06-28 PROCEDURE — 3008F BODY MASS INDEX DOCD: CPT | Mod: CPTII,,, | Performed by: NURSE PRACTITIONER

## 2023-06-28 PROCEDURE — 3075F PR MOST RECENT SYSTOLIC BLOOD PRESS GE 130-139MM HG: ICD-10-PCS | Mod: CPTII,,, | Performed by: NURSE PRACTITIONER

## 2023-06-28 PROCEDURE — 3061F PR NEG MICROALBUMINURIA RESULT DOCUMENTED/REVIEW: ICD-10-PCS | Mod: CPTII,,, | Performed by: NURSE PRACTITIONER

## 2023-06-28 PROCEDURE — 3044F HG A1C LEVEL LT 7.0%: CPT | Mod: CPTII,,, | Performed by: NURSE PRACTITIONER

## 2023-06-28 PROCEDURE — 99999 PR PBB SHADOW E&M-EST. PATIENT-LVL IV: CPT | Mod: PBBFAC,,, | Performed by: NURSE PRACTITIONER

## 2023-06-28 PROCEDURE — 99214 OFFICE O/P EST MOD 30 MIN: CPT | Mod: PBBFAC,PO | Performed by: NURSE PRACTITIONER

## 2023-06-28 PROCEDURE — 3075F SYST BP GE 130 - 139MM HG: CPT | Mod: CPTII,,, | Performed by: NURSE PRACTITIONER

## 2023-06-28 PROCEDURE — 1159F MED LIST DOCD IN RCRD: CPT | Mod: CPTII,,, | Performed by: NURSE PRACTITIONER

## 2023-06-28 PROCEDURE — 3061F NEG MICROALBUMINURIA REV: CPT | Mod: CPTII,,, | Performed by: NURSE PRACTITIONER

## 2023-06-28 PROCEDURE — 3079F PR MOST RECENT DIASTOLIC BLOOD PRESSURE 80-89 MM HG: ICD-10-PCS | Mod: CPTII,,, | Performed by: NURSE PRACTITIONER

## 2023-06-28 PROCEDURE — 99214 OFFICE O/P EST MOD 30 MIN: CPT | Mod: S$PBB,,, | Performed by: NURSE PRACTITIONER

## 2023-06-28 PROCEDURE — 99999 PR PBB SHADOW E&M-EST. PATIENT-LVL IV: ICD-10-PCS | Mod: PBBFAC,,, | Performed by: NURSE PRACTITIONER

## 2023-06-28 NOTE — PROGRESS NOTES
"32y o male returns for follow-up on T2 DM   Last seen may 17th - 2023 - those notes below.     A1c down from 6.7% to 6.1% -   Kidney function stable, fasting glucose is at level of 76 on labs.   Side effects/GI with 1 week on metformin in past.   with new start to Ozempic    States he is doing well and mom reports no side effects from Ozempic 0.5mg SQ QW  Mom gives him the injection once per week.   Reports normal BMs, eating less, lost 2 pounds  Denies abdominal issues or pain, no N/V.   Diet: same as previous visit, but now smaller portions  Drinks: Body [place] lite, diet drinks, flavored water, grape juice on occasion  Awake all night often, sleeps during the day.   He is not checking sugars - mom has a glucometer - but states they are on "different schedules" so she doesn't check.     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   Cholesterol - improved from last visit - newly on statin.   Lft's are elevated from history of fatty liver in the past, still around the same trend on review today.   Nonverbal often - but does communicate and make eye contact during the visit -       Last visit notes from May 17th, 2023 -   HPI: Seun Wynne is a 32 y.o.  male c/I for visit to address Diabetes Type 2  This is the first time I am seeing  him for care, follows with Dr. Karla Arce MD for primary care needs.   Has not seen endocrinology or diabetes specialist in the past.     was diagnosed with T2DM this past year.   Mom also has diabetes T2dm and I see her as well.   Has never been hospitalized r/t DM.  He did try metformin for about 1 week and had severe stomach upset and diarrhea, so stopped taking it.   Is not checking blood sugars.   Admits not following ADA diet -   Likes to snack.   Has been overweight his entire life, even as a young child.     Complications from diabetes and pertinent co-morbidities include:   Negative for DKA.   Has never taken insulin in life.   -negative for diabetic neuropathy. " "  -negative for nephropathy.   No microalbuminuria.   Eyes:  negative for Diabetic retinopathy   CV: Denies history of MI nor stroke.   CAD: Denies.  Takes aspirin 81mg tablet daily  BP: has history of HTN  Statin: Taking  ACE/ARB: Taking    Social History     Tobacco Use   Smoking Status Never   Smokeless Tobacco Never        Past medical History:   Past Medical History:   Diagnosis Date    Asperger syndrome     Diabetes mellitus, type 2     "under control after weight loss"    Hyperlipidemia     Hypertension     "BEEN FINE IN RECENT YRS NEVER TOOK MEDS"    Obesity       Family hx:   Family History   Problem Relation Age of Onset    Hypertension Mother     Cataracts Mother     Hypertension Father     Cancer Maternal Aunt         gallbladder    Diabetes Maternal Grandfather     Hypertension Maternal Grandfather     Hypertension Paternal Grandfather     Cirrhosis Paternal Grandmother     Heart disease Neg Hx     Glaucoma Neg Hx     Macular degeneration Neg Hx       Current meds:   Current Outpatient Medications:     cholecalciferol, vitamin D3, (VITAMIN D3) 50 mcg (2,000 unit) Cap, Take 1 capsule by mouth once daily., Disp: , Rfl:     desmopressin (DDAVP) 0.2 MG tablet, Take 1 tablet (200 mcg total) by mouth once daily., Disp: 30 tablet, Rfl: 5    EScitalopram oxalate (LEXAPRO) 20 MG tablet, Take 1 tablet (20 mg total) by mouth once daily., Disp: 30 tablet, Rfl: 5    losartan (COZAAR) 25 MG tablet, Take 1 tablet (25 mg total) by mouth once daily., Disp: 90 tablet, Rfl: 1    multivitamin capsule, Take 1 capsule by mouth once daily., Disp: , Rfl:     ondansetron (ZOFRAN-ODT) 8 MG TbDL, Take 1 tablet (8 mg total) by mouth every 12 (twelve) hours as needed (nausea or vomiting)., Disp: 30 tablet, Rfl: 0    pantoprazole (PROTONIX) 40 MG tablet, Take 1 tablet (40 mg total) by mouth once daily., Disp: 90 tablet, Rfl: 1    propranoloL (INDERAL LA) 60 MG 24 hr capsule, Take 1 capsule (60 mg total) by mouth once daily., Disp: " "90 capsule, Rfl: 3    rosuvastatin (CRESTOR) 40 MG Tab, Take 1 tablet (40 mg total) by mouth once daily., Disp: 90 tablet, Rfl: 3    semaglutide (OZEMPIC) 0.25 mg or 0.5 mg (2 mg/3 mL) pen injector, Inject 0.5 mg into the skin every 7 days. (Patient will begin with 0.25mg every week for the first 2 weeks, then increase dose to 0.5mg thereafter and stay on this dose)., Disp: 1 each, Rfl: 3    topiramate (TOPAMAX) 25 MG tablet, Take 2 tablets (50 mg total) by mouth once daily., Disp: 180 tablet, Rfl: 3     Current Diabetes medications:   Ozempic 0.5mg Qw- tolerating well    Medications Tried and Failed:   Metformin - GI upset so stopped taking. (After 1 week).     Social:   Lives at home with: mother.   Life changes/stressors currently: none. He does sleep day time and up at night.   Diet: following ADA diet   Meals: 3 per day and snacks.        Breakfast - leftover from dinner - meats/potatoes/rice/bread       Lunch - sandwich       Dinner - meats/home cooked       Snacks - chips/cookies.         Drinks - sugar free body armour, sugar free sodas.  Exercise: none.   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     Glucose Monitoring:   Not checking blood sugar      Standards of care:   Eyes: .Most Recent Eye Exam Date: Not Found  Foot exam: Most Recent Foot Exam Date: Not Found   Diabetes education: None.    Vital Signs  /80 (BP Location: Right arm, Patient Position: Sitting, BP Method: Large (Manual))   Pulse 62   Temp 97.7 °F (36.5 °C) (Temporal)   Resp 15   Ht 6' 4" (1.93 m)   Wt (!) 148 kg (326 lb 4.5 oz)   SpO2 97%   BMI 39.72 kg/m²     Pertinent Labs:   Hgba1c   Lab Results   Component Value Date    HGBA1C 6.1 (H) 06/22/2023    HGBA1C 6.7 (H) 03/28/2023    HGBA1C 6.3 (H) 08/08/2022     Lipid panel   Lab Results   Component Value Date    CHOL 181 06/22/2023    CHOL 211 (H) 11/18/2022    CHOL 206 (H) 08/08/2022     Lab Results   Component Value Date    HDL 47 06/22/2023    HDL " 46 11/18/2022    HDL 44 08/08/2022     Lab Results   Component Value Date    LDLCALC 107.6 06/22/2023    LDLCALC 117.0 11/18/2022    LDLCALC 124.0 08/08/2022     Lab Results   Component Value Date    TRIG 132 06/22/2023    TRIG 240 (H) 11/18/2022    TRIG 190 (H) 08/08/2022     Lab Results   Component Value Date    CHOLHDL 26.0 06/22/2023    CHOLHDL 21.8 11/18/2022    CHOLHDL 21.4 08/08/2022      CMP  Glucose   Date Value Ref Range Status   06/22/2023 76 70 - 110 mg/dL Final     BUN   Date Value Ref Range Status   06/22/2023 13 6 - 20 mg/dL Final     Creatinine   Date Value Ref Range Status   06/22/2023 1.0 0.5 - 1.4 mg/dL Final     eGFR if    Date Value Ref Range Status   12/23/2021 >60 >60 mL/min/1.73 m^2 Final     eGFR if non    Date Value Ref Range Status   12/23/2021 >60 >60 mL/min/1.73 m^2 Final     Comment:     Calculation used to obtain the estimated glomerular filtration  rate (eGFR) is the CKD-EPI equation.        AST   Date Value Ref Range Status   06/22/2023 68 (H) 10 - 40 U/L Final     ALT   Date Value Ref Range Status   06/22/2023 95 (H) 10 - 44 U/L Final     Microalbumin creatinine ratio:   Lab Results   Component Value Date    MICALBCREAT 5.3 06/22/2023       Review Of Systems:   Gen: Appetite good, + weight loss, denies fatigue and weakness. + polydipsia - but mom says he drinks a lot.   Skin: Skin is intact and heals well, denies any rashes or hair changes.   EENT: Denies any acute visual disturbances, nor blurred vision.    Resp: Denies SOB or Dyspnea on exertion, denies cough.   Cardiac: Denies chest pain, palpitations, or swelling.   GI: Denies abdominal pain, nausea or vomiting, diarrhea, or constipation.   /GYN: Denies nocturia, nor burning, frequency or pain on urination.  MS/Neuro: Denies numbness/ tingling in BLE; Gait steady, speech clear  Psych: Denies drug/ETOH abuse, no hx of depression.  Other systems: negative.    Physical Exam:   GENERAL: Well  developed, well nourished in appearance.   PSYCH: AAOx3, Flat mood and affect, pleasant expression, non-conversant, appears relaxed, well groomed.   EYES: PERRL, Conjunctiva and corneas clear  NECK: Soft and Supple, trachea midline,   CHEST: Even, regular, and unlabored respirations  ABDOMEN: Soft, non-tender, non-distended.   VASCULAR:  no edema.  NEURO:  cranial nerves II - XII intact   MUSCULOSKELETAL: Good ROM, steady gait.   SKIN: Skin warm, dry, and intact - splotchy discolored patches/dark on abdomen noted.     Assessment and Plan of Care:     Seun was seen today for follow-up.    Diagnoses and all orders for this visit:    Type 2 diabetes mellitus with obesity  -     Hemoglobin A1C; Future  -     Comprehensive Metabolic Panel; Future    Mixed hyperlipidemia    Pervasive developmental disorder    Mild intellectual disability    NAFLD (nonalcoholic fatty liver disease)    Chronic migraine without aura without status migrainosus, not intractable         1. T2DM with hyperglycemia- Hgba1c goal is 7.5% or less without hypoglycemia - 6.1%--> 6.3%--> 6.7% --6.1  discussed DM, progression of disease, long term complications, CV risk factors and tx options.   Advise compliance with ADA diet and encourage exercise  Intolerant to metformin. Ozempic 0.5 mg SC weekly for now - Stay at this dose.   Will consider bumping to 1mg in 3 months - will go slow/patient non verbal. Trying to reduce any potential side effects.   No known history of pancreatitis or medullary thyroid cancer.   If you experience severe abdominal pain, nausea, or diarrhea - please call me or notify my office immediately.    Elevated LFT's fatty liver in past - monitor.   Reviewed  hypoglycemia, s/s and appropriate tx. Have/get quick acting glucose tablets at hand.   Can check with mom's meter before a meal - spot check - normal bg's told 80 - 100's.   They don't want an extra meter or check regularly at this time.     2. HTN- controlled, continue  meds as previously prescribed and monitor.   No urine mac - none checked recent.     3. Lipids- LDL goal < 100. Not at goal  Currently on statin therapy- recheck lipid was great!   Elevated LFT's noted - patient denies any acute abdominal issues - will monitor.     4. Weight - BMI Body mass index is 39.72 kg/m².   Encourage Ada diet and exercise.   Continue the glp - likely increase next visit.     5. Renal Function - stable, no nephropathy.        Continue Ozempic 0.5 mg Qw  Follow up in  12 weeks with OV and labs prior.

## 2023-07-26 ENCOUNTER — IMMUNIZATION (OUTPATIENT)
Dept: PHARMACY | Facility: CLINIC | Age: 33
End: 2023-07-26
Payer: MEDICAID

## 2023-07-26 DIAGNOSIS — Z23 NEED FOR VACCINATION: Primary | ICD-10-CM

## 2023-08-03 DIAGNOSIS — I10 ESSENTIAL HYPERTENSION: ICD-10-CM

## 2023-08-03 RX ORDER — LOSARTAN POTASSIUM 25 MG/1
25 TABLET ORAL
Qty: 90 TABLET | Refills: 0 | Status: SHIPPED | OUTPATIENT
Start: 2023-08-03 | End: 2023-10-24

## 2023-08-03 NOTE — TELEPHONE ENCOUNTER
Refill Decision Note   Seun Wynne  is requesting a refill authorization.  Brief Assessment and Rationale for Refill:  Approve     Medication Therapy Plan:       Medication Reconciliation Completed: No   Comments:     No Care Gaps recommended.     Note composed:2:30 PM 08/03/2023

## 2023-08-03 NOTE — TELEPHONE ENCOUNTER
No care due was identified.  [facility] Embedded Care Due Messages. Reference number: [id number].   8/03/2023 10:11:31 AM CDT

## 2023-08-17 DIAGNOSIS — E78.2 MIXED HYPERLIPIDEMIA: ICD-10-CM

## 2023-08-17 RX ORDER — ROSUVASTATIN CALCIUM 40 MG/1
TABLET, COATED ORAL
Qty: 90 TABLET | Refills: 0 | Status: SHIPPED | OUTPATIENT
Start: 2023-08-17 | End: 2023-11-13

## 2023-08-17 NOTE — TELEPHONE ENCOUNTER
Refill Decision Note   Seun Wynne  is requesting a refill authorization.  Brief Assessment and Rationale for Refill:  Approve     Medication Therapy Plan:         Comments:     Note composed:12:00 PM 08/17/2023             Appointments     Last Visit   8/15/2022 Karla Arce MD   Next Visit   Visit date not found Karla Arce MD

## 2023-08-17 NOTE — TELEPHONE ENCOUNTER
No care due was identified.  [facility] Embedded Care Due Messages. Reference number: [id number].   8/17/2023 11:50:15 AM CDT

## 2023-08-29 DIAGNOSIS — R12 HEARTBURN: ICD-10-CM

## 2023-08-30 RX ORDER — PANTOPRAZOLE SODIUM 40 MG/1
40 TABLET, DELAYED RELEASE ORAL
Qty: 90 TABLET | Refills: 0 | Status: SHIPPED | OUTPATIENT
Start: 2023-08-30 | End: 2023-11-29

## 2023-08-30 NOTE — TELEPHONE ENCOUNTER
No care due was identified.  [facility] Embedded Care Due Messages. Reference number: [id number].   8/29/2023 8:49:54 PM CDT

## 2023-08-30 NOTE — TELEPHONE ENCOUNTER
Refill Decision Note   Seun Wynne  is requesting a refill authorization.  Brief Assessment and Rationale for Refill:  Approve     Medication Therapy Plan:         Comments:     Note composed:4:57 AM 08/30/2023             Appointments     Last Visit   8/15/2022 Karla Arce MD   Next Visit   Visit date not found Karla Arce MD

## 2023-09-03 DIAGNOSIS — R12 HEARTBURN: ICD-10-CM

## 2023-09-03 NOTE — TELEPHONE ENCOUNTER
No care due was identified.  [facility] Embedded Care Due Messages. Reference number: [id number].   9/03/2023 11:34:39 AM CDT

## 2023-09-04 RX ORDER — PANTOPRAZOLE SODIUM 40 MG/1
40 TABLET, DELAYED RELEASE ORAL
Qty: 90 TABLET | Refills: 0 | OUTPATIENT
Start: 2023-09-04

## 2023-09-04 NOTE — TELEPHONE ENCOUNTER
Refill Decision Note   Seun Wynne  is requesting a refill authorization.  Brief Assessment and Rationale for Refill:  Quick Discontinue     Medication Therapy Plan:  Receipt confirmed by pharmacy (8/30/2023  4:57 AM CDT)      Comments:     Note composed:6:30 AM 09/04/2023             Appointments     Last Visit   8/15/2022 Karla Arce MD   Next Visit   Visit date not found Karla Arce MD

## 2023-09-20 DIAGNOSIS — E11.69 TYPE 2 DIABETES MELLITUS WITH OBESITY: ICD-10-CM

## 2023-09-20 DIAGNOSIS — E66.9 TYPE 2 DIABETES MELLITUS WITH OBESITY: ICD-10-CM

## 2023-09-20 RX ORDER — SEMAGLUTIDE 0.68 MG/ML
0.5 INJECTION, SOLUTION SUBCUTANEOUS
Qty: 1 EACH | Refills: 2 | Status: SHIPPED | OUTPATIENT
Start: 2023-09-20 | End: 2023-09-27 | Stop reason: SDUPTHER

## 2023-09-21 ENCOUNTER — TELEPHONE (OUTPATIENT)
Dept: INTERNAL MEDICINE | Facility: CLINIC | Age: 33
End: 2023-09-21
Payer: MEDICAID

## 2023-09-21 NOTE — TELEPHONE ENCOUNTER
----- Message from Mounika Moser sent at 9/21/2023  1:27 PM CDT -----  Contact: [phone] @ patient Mom  Good afternoon patient would like a call back to see if he can get his apt pushed back any time after 1pm on the same day of his apt on 9/27 due to his ride. Please give patient a call back [phone]

## 2023-09-25 ENCOUNTER — LAB VISIT (OUTPATIENT)
Dept: LAB | Facility: HOSPITAL | Age: 33
End: 2023-09-25
Payer: MEDICAID

## 2023-09-25 DIAGNOSIS — E66.9 TYPE 2 DIABETES MELLITUS WITH OBESITY: ICD-10-CM

## 2023-09-25 DIAGNOSIS — E11.69 TYPE 2 DIABETES MELLITUS WITH OBESITY: ICD-10-CM

## 2023-09-25 LAB
ALBUMIN SERPL BCP-MCNC: 4.7 G/DL (ref 3.5–5.2)
ALP SERPL-CCNC: 39 U/L (ref 55–135)
ALT SERPL W/O P-5'-P-CCNC: 92 U/L (ref 10–44)
ANION GAP SERPL CALC-SCNC: 10 MMOL/L (ref 8–16)
AST SERPL-CCNC: 43 U/L (ref 10–40)
BILIRUB SERPL-MCNC: 0.8 MG/DL (ref 0.1–1)
BUN SERPL-MCNC: 13 MG/DL (ref 6–20)
CALCIUM SERPL-MCNC: 9.9 MG/DL (ref 8.7–10.5)
CHLORIDE SERPL-SCNC: 104 MMOL/L (ref 95–110)
CO2 SERPL-SCNC: 25 MMOL/L (ref 23–29)
CREAT SERPL-MCNC: 1.2 MG/DL (ref 0.5–1.4)
EST. GFR  (NO RACE VARIABLE): >60 ML/MIN/1.73 M^2
ESTIMATED AVG GLUCOSE: 120 MG/DL (ref 68–131)
GLUCOSE SERPL-MCNC: 80 MG/DL (ref 70–110)
HBA1C MFR BLD: 5.8 % (ref 4–5.6)
POTASSIUM SERPL-SCNC: 3.8 MMOL/L (ref 3.5–5.1)
PROT SERPL-MCNC: 8.2 G/DL (ref 6–8.4)
SODIUM SERPL-SCNC: 139 MMOL/L (ref 136–145)

## 2023-09-25 PROCEDURE — 36415 COLL VENOUS BLD VENIPUNCTURE: CPT | Performed by: NURSE PRACTITIONER

## 2023-09-25 PROCEDURE — 80053 COMPREHEN METABOLIC PANEL: CPT | Performed by: NURSE PRACTITIONER

## 2023-09-25 PROCEDURE — 83036 HEMOGLOBIN GLYCOSYLATED A1C: CPT | Performed by: NURSE PRACTITIONER

## 2023-09-27 ENCOUNTER — OFFICE VISIT (OUTPATIENT)
Dept: INTERNAL MEDICINE | Facility: CLINIC | Age: 33
End: 2023-09-27
Payer: MEDICAID

## 2023-09-27 VITALS
HEART RATE: 60 BPM | HEIGHT: 76 IN | DIASTOLIC BLOOD PRESSURE: 90 MMHG | SYSTOLIC BLOOD PRESSURE: 118 MMHG | BODY MASS INDEX: 38.36 KG/M2 | OXYGEN SATURATION: 96 % | TEMPERATURE: 98 F | WEIGHT: 315 LBS | RESPIRATION RATE: 15 BRPM

## 2023-09-27 DIAGNOSIS — E66.9 TYPE 2 DIABETES MELLITUS WITH OBESITY: Primary | ICD-10-CM

## 2023-09-27 DIAGNOSIS — E78.2 MIXED HYPERLIPIDEMIA: ICD-10-CM

## 2023-09-27 DIAGNOSIS — E11.69 TYPE 2 DIABETES MELLITUS WITH OBESITY: Primary | ICD-10-CM

## 2023-09-27 DIAGNOSIS — K76.0 NAFLD (NONALCOHOLIC FATTY LIVER DISEASE): ICD-10-CM

## 2023-09-27 PROCEDURE — 3080F DIAST BP >= 90 MM HG: CPT | Mod: CPTII,,, | Performed by: NURSE PRACTITIONER

## 2023-09-27 PROCEDURE — 99999 PR PBB SHADOW E&M-EST. PATIENT-LVL IV: ICD-10-PCS | Mod: PBBFAC,,, | Performed by: NURSE PRACTITIONER

## 2023-09-27 PROCEDURE — 99999PBSHW FLU VACCINE (QUAD) GREATER THAN OR EQUAL TO 3YO PRESERVATIVE FREE IM: Mod: PBBFAC,,,

## 2023-09-27 PROCEDURE — 99214 OFFICE O/P EST MOD 30 MIN: CPT | Mod: S$PBB,,, | Performed by: NURSE PRACTITIONER

## 2023-09-27 PROCEDURE — 99999 PR PBB SHADOW E&M-EST. PATIENT-LVL IV: CPT | Mod: PBBFAC,,, | Performed by: NURSE PRACTITIONER

## 2023-09-27 PROCEDURE — 99214 PR OFFICE/OUTPT VISIT, EST, LEVL IV, 30-39 MIN: ICD-10-PCS | Mod: S$PBB,,, | Performed by: NURSE PRACTITIONER

## 2023-09-27 PROCEDURE — 4010F PR ACE/ARB THEARPY RXD/TAKEN: ICD-10-PCS | Mod: CPTII,,, | Performed by: NURSE PRACTITIONER

## 2023-09-27 PROCEDURE — 3061F NEG MICROALBUMINURIA REV: CPT | Mod: CPTII,,, | Performed by: NURSE PRACTITIONER

## 2023-09-27 PROCEDURE — 3066F NEPHROPATHY DOC TX: CPT | Mod: CPTII,,, | Performed by: NURSE PRACTITIONER

## 2023-09-27 PROCEDURE — 1159F PR MEDICATION LIST DOCUMENTED IN MEDICAL RECORD: ICD-10-PCS | Mod: CPTII,,, | Performed by: NURSE PRACTITIONER

## 2023-09-27 PROCEDURE — 1160F RVW MEDS BY RX/DR IN RCRD: CPT | Mod: CPTII,,, | Performed by: NURSE PRACTITIONER

## 2023-09-27 PROCEDURE — 99214 OFFICE O/P EST MOD 30 MIN: CPT | Mod: PBBFAC,PO | Performed by: NURSE PRACTITIONER

## 2023-09-27 PROCEDURE — 4010F ACE/ARB THERAPY RXD/TAKEN: CPT | Mod: CPTII,,, | Performed by: NURSE PRACTITIONER

## 2023-09-27 PROCEDURE — 3008F PR BODY MASS INDEX (BMI) DOCUMENTED: ICD-10-PCS | Mod: CPTII,,, | Performed by: NURSE PRACTITIONER

## 2023-09-27 PROCEDURE — 3074F SYST BP LT 130 MM HG: CPT | Mod: CPTII,,, | Performed by: NURSE PRACTITIONER

## 2023-09-27 PROCEDURE — 3061F PR NEG MICROALBUMINURIA RESULT DOCUMENTED/REVIEW: ICD-10-PCS | Mod: CPTII,,, | Performed by: NURSE PRACTITIONER

## 2023-09-27 PROCEDURE — 90686 IIV4 VACC NO PRSV 0.5 ML IM: CPT | Mod: PBBFAC,PO

## 2023-09-27 PROCEDURE — 1159F MED LIST DOCD IN RCRD: CPT | Mod: CPTII,,, | Performed by: NURSE PRACTITIONER

## 2023-09-27 PROCEDURE — 3044F PR MOST RECENT HEMOGLOBIN A1C LEVEL <7.0%: ICD-10-PCS | Mod: CPTII,,, | Performed by: NURSE PRACTITIONER

## 2023-09-27 PROCEDURE — 3008F BODY MASS INDEX DOCD: CPT | Mod: CPTII,,, | Performed by: NURSE PRACTITIONER

## 2023-09-27 PROCEDURE — 99999PBSHW FLU VACCINE (QUAD) GREATER THAN OR EQUAL TO 3YO PRESERVATIVE FREE IM: ICD-10-PCS | Mod: PBBFAC,,,

## 2023-09-27 PROCEDURE — 1160F PR REVIEW ALL MEDS BY PRESCRIBER/CLIN PHARMACIST DOCUMENTED: ICD-10-PCS | Mod: CPTII,,, | Performed by: NURSE PRACTITIONER

## 2023-09-27 PROCEDURE — 3066F PR DOCUMENTATION OF TREATMENT FOR NEPHROPATHY: ICD-10-PCS | Mod: CPTII,,, | Performed by: NURSE PRACTITIONER

## 2023-09-27 PROCEDURE — 3074F PR MOST RECENT SYSTOLIC BLOOD PRESSURE < 130 MM HG: ICD-10-PCS | Mod: CPTII,,, | Performed by: NURSE PRACTITIONER

## 2023-09-27 PROCEDURE — 3044F HG A1C LEVEL LT 7.0%: CPT | Mod: CPTII,,, | Performed by: NURSE PRACTITIONER

## 2023-09-27 PROCEDURE — 3080F PR MOST RECENT DIASTOLIC BLOOD PRESSURE >= 90 MM HG: ICD-10-PCS | Mod: CPTII,,, | Performed by: NURSE PRACTITIONER

## 2023-09-27 RX ORDER — SEMAGLUTIDE 0.68 MG/ML
0.5 INJECTION, SOLUTION SUBCUTANEOUS
Qty: 1 EACH | Refills: 6 | Status: SHIPPED | OUTPATIENT
Start: 2023-09-27 | End: 2024-03-11

## 2023-09-27 NOTE — PROGRESS NOTES
"33 y.o. male here for routine follow up visit for management of t2dm -   Last seen June 2023 - those notes are below.     Hgba1c is @ 5.8% -   He was 326 lbs last visit -   Today is at 319 lbs.   Is continuing on ozempic 0.5mg SC every week.   Eating around 2 meals per day,  mabye 1 snack.   Mom with him at the visit - he is mostly nonverbal, doesn't make much eye contact.   She checks glucose 1 time per week - ranging 78 - 90's fasting   He feels well overall without acute complaints.   No abdominal pain, no nausea-   No constipation.       Last visit notes from June 2023 -   32y o male returns for follow-up on T2 DM   Last seen may 17th - 2023 - those notes below.     A1c down from 6.7% to 6.1% -   Kidney function stable, fasting glucose is at level of 76 on labs.   Side effects/GI with 1 week on metformin in past.   with new start to Ozempic    States he is doing well and mom reports no side effects from Ozempic 0.5mg SQ QW  Mom gives him the injection once per week.   Reports normal BMs, eating less, lost 2 pounds  Denies abdominal issues or pain, no N/V.   Diet: same as previous visit, but now smaller portions  Drinks: Body [place] lite, diet drinks, flavored water, grape juice on occasion  Awake all night often, sleeps during the day.   He is not checking sugars - mom has a glucometer - but states they are on "different schedules" so she doesn't check.     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   Cholesterol - improved from last visit - newly on statin.   Lft's are elevated from history of fatty liver in the past, still around the same trend on review today.   Nonverbal often - but does communicate and make eye contact during the visit -       Last visit notes from May 17th, 2023 -   HPI: Seun Wynne is a 33 y.o.  male c/I for visit to address Diabetes Type 2  This is the first time I am seeing  him for care, follows with Karla Miller MD for primary care needs.   Has not seen endocrinology " "or diabetes specialist in the past.     was diagnosed with T2DM this past year.   Mom also has diabetes T2dm and I see her as well.   Has never been hospitalized r/t DM.  He did try metformin for about 1 week and had severe stomach upset and diarrhea, so stopped taking it.   Is not checking blood sugars.   Admits not following ADA diet -   Likes to snack.   Has been overweight his entire life, even as a young child.     Complications from diabetes and pertinent co-morbidities include:   Negative for DKA.   Has never taken insulin in life.   -negative for diabetic neuropathy.   -negative for nephropathy.   No microalbuminuria.   Eyes:  negative for Diabetic retinopathy   CV: Denies history of MI nor stroke.   CAD: Denies.  Takes aspirin 81mg tablet daily  BP: has history of HTN  Statin: Taking  ACE/ARB: Taking    Social History     Tobacco Use   Smoking Status Never   Smokeless Tobacco Never        Past medical History:   Past Medical History:   Diagnosis Date    Asperger syndrome     Diabetes mellitus, type 2     "under control after weight loss"    Hyperlipidemia     Hypertension     "BEEN FINE IN RECENT YRS NEVER TOOK MEDS"    Obesity       Family hx:   Family History   Problem Relation Age of Onset    Hypertension Mother     Cataracts Mother     Hypertension Father     Cancer Maternal Aunt         gallbladder    Diabetes Maternal Grandfather     Hypertension Maternal Grandfather     Hypertension Paternal Grandfather     Cirrhosis Paternal Grandmother     Heart disease Neg Hx     Glaucoma Neg Hx     Macular degeneration Neg Hx       Current meds:   Current Outpatient Medications:     cholecalciferol, vitamin D3, (VITAMIN D3) 50 mcg (2,000 unit) Cap, Take 1 capsule by mouth once daily., Disp: , Rfl:     EScitalopram oxalate (LEXAPRO) 20 MG tablet, Take 1 tablet (20 mg total) by mouth once daily., Disp: 30 tablet, Rfl: 5    losartan (COZAAR) 25 MG tablet, Take 1 tablet by mouth once daily, Disp: 90 tablet, Rfl: 0    " "multivitamin capsule, Take 1 capsule by mouth once daily., Disp: , Rfl:     ondansetron (ZOFRAN-ODT) 8 MG TbDL, Take 1 tablet (8 mg total) by mouth every 12 (twelve) hours as needed (nausea or vomiting)., Disp: 30 tablet, Rfl: 0    pantoprazole (PROTONIX) 40 MG tablet, Take 1 tablet by mouth once daily, Disp: 90 tablet, Rfl: 0    propranoloL (INDERAL LA) 60 MG 24 hr capsule, Take 1 capsule (60 mg total) by mouth once daily., Disp: 90 capsule, Rfl: 3    rosuvastatin (CRESTOR) 40 MG Tab, Take 1 tablet (40 mg total) by mouth once daily., Disp: 90 tablet, Rfl: 0    semaglutide (OZEMPIC) 0.25 mg or 0.5 mg (2 mg/3 mL) pen injector, Inject 0.5 mg into the skin every 7 days. (Patient will begin with 0.25mg every week for the first 2 weeks, then increase dose to 0.5mg thereafter and stay on this dose)., Disp: 1 each, Rfl: 2    topiramate (TOPAMAX) 25 MG tablet, Take 2 tablets (50 mg total) by mouth once daily., Disp: 180 tablet, Rfl: 3     Current Diabetes medications:   Ozempic 0.5mg Qw- tolerating well    Medications Tried and Failed:   Metformin - GI upset so stopped taking. (After 1 week).     Social:   Lives at home with: mother.   Life changes/stressors currently: none. He does sleep day time and up at night.   Diet: following ADA diet   Meals: 3 per day and snacks.        Breakfast - leftover from dinner - meats/potatoes/rice/bread       Lunch - sandwich       Dinner - meats/home cooked       Snacks - chips/cookies.         Drinks - sugar free body armour, sugar free sodas.  Exercise: none.   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     Glucose Monitoring:   Not checking blood sugar      Standards of care:   Eyes: .: 11/04/2021  Foot exam: : 05/17/2023   Diabetes education: None.    Vital Signs  BP (!) 118/90 (BP Location: Left arm, Patient Position: Sitting, BP Method: Large (Manual))   Pulse 60   Temp 97.7 °F (36.5 °C) (Temporal)   Resp 15   Ht 6' 4" (1.93 m)   Wt (!) 145 kg (319 lb " 10.7 oz)   SpO2 96%   BMI 38.91 kg/m²     Pertinent Labs:   Hgba1c   Lab Results   Component Value Date    HGBA1C 5.8 (H) 09/25/2023    HGBA1C 6.1 (H) 06/22/2023    HGBA1C 6.7 (H) 03/28/2023     Lipid panel   Lab Results   Component Value Date    CHOL 181 06/22/2023    CHOL 211 (H) 11/18/2022    CHOL 206 (H) 08/08/2022     Lab Results   Component Value Date    HDL 47 06/22/2023    HDL 46 11/18/2022    HDL 44 08/08/2022     Lab Results   Component Value Date    LDLCALC 107.6 06/22/2023    LDLCALC 117.0 11/18/2022    LDLCALC 124.0 08/08/2022     Lab Results   Component Value Date    TRIG 132 06/22/2023    TRIG 240 (H) 11/18/2022    TRIG 190 (H) 08/08/2022     Lab Results   Component Value Date    CHOLHDL 26.0 06/22/2023    CHOLHDL 21.8 11/18/2022    CHOLHDL 21.4 08/08/2022      CMP  Glucose   Date Value Ref Range Status   09/25/2023 80 70 - 110 mg/dL Final     BUN   Date Value Ref Range Status   09/25/2023 13 6 - 20 mg/dL Final     Creatinine   Date Value Ref Range Status   09/25/2023 1.2 0.5 - 1.4 mg/dL Final     eGFR if    Date Value Ref Range Status   12/23/2021 >60 >60 mL/min/1.73 m^2 Final     eGFR if non    Date Value Ref Range Status   12/23/2021 >60 >60 mL/min/1.73 m^2 Final     Comment:     Calculation used to obtain the estimated glomerular filtration  rate (eGFR) is the CKD-EPI equation.        AST   Date Value Ref Range Status   09/25/2023 43 (H) 10 - 40 U/L Final     ALT   Date Value Ref Range Status   09/25/2023 92 (H) 10 - 44 U/L Final     Microalbumin creatinine ratio:   Lab Results   Component Value Date    MICALBCREAT 5.3 06/22/2023       Review Of Systems:   Gen: Appetite good, + weight loss, denies fatigue and weakness. + polydipsia - but mom says he drinks a lot.   Skin: Skin is intact and heals well, denies any rashes or hair changes.   EENT: Denies any acute visual disturbances, nor blurred vision.    Resp: Denies SOB or Dyspnea on exertion, denies cough.    Cardiac: Denies chest pain, palpitations, or swelling.   GI: Denies abdominal pain, nausea or vomiting, diarrhea, or constipation.   /GYN: Denies nocturia, nor burning, frequency or pain on urination.  MS/Neuro: Denies numbness/ tingling in BLE; Gait steady, speech clear  Psych: Denies drug/ETOH abuse, no hx of depression.  Other systems: negative.    Physical Exam:   GENERAL: Well developed, well nourished in appearance.   PSYCH: AAOx3, Flat mood and affect, pleasant expression, non-conversant, appears relaxed, well groomed.   EYES: PERRL, Conjunctiva and corneas clear  NECK: Soft and Supple, trachea midline,   CHEST: Even, regular, and unlabored respirations  ABDOMEN: Soft, non-tender, non-distended.   VASCULAR:  no edema.  NEURO:  cranial nerves II - XII intact   MUSCULOSKELETAL: Good ROM, steady gait.   SKIN: Skin warm, dry, and intact - splotchy discolored patches/dark on abdomen noted.     Assessment and Plan of Care:     Seun was seen today for follow-up.    Diagnoses and all orders for this visit:    Type 2 diabetes mellitus with obesity    BMI 38.0-38.9,adult    NAFLD (nonalcoholic fatty liver disease)    Mixed hyperlipidemia           1. T2DM with hyperglycemia- Hgba1c goal is 7.5% or less without hypoglycemia - 6.1%--> 6.3%--> 6.7% --6.1%-- 5.8%   discussed DM, progression of disease, long term complications, CV risk factors and tx options.   Advise compliance with ADA diet and encourage exercise  Intolerant to metformin. Ozempic 0.5 mg SC weekly for now - Stay at this dose.   Will consider bumping to 1mg in 3 - 6  months - will go slow/patient non verbal. Trying to reduce any potential side effects.   No known history of pancreatitis or medullary thyroid cancer.   If you experience severe abdominal pain, nausea, or diarrhea - please call me or notify my office immediately.    Elevated LFT's fatty liver in past - monitor.   Reviewed  hypoglycemia, s/s and appropriate tx. Have/get quick acting  glucose tablets at hand.   Can check with mom's meter before a meal - spot check - normal bg's told 80 - 100's.   They don't want an extra meter or check regularly at this time.     2. HTN- controlled, continue meds as previously prescribed and monitor.   No urine mac - none checked recent.     3. Lipids- LDL goal < 100. Not at goal  Currently on statin therapy- recheck lipid was great!   Elevated LFT's noted - patient denies any acute abdominal issues - will monitor.     4. Weight - BMI Body mass index is 38.91 kg/m².   Encourage Ada diet and exercise.   Continue the glp - likely increase next visit.     5. Renal Function - stable, no nephropathy.     6. Fatty liver - following with hepatology        Continue Ozempic 0.5 mg Qw  Follow up in 6 month follow up   And labs.   Eyes exam - follow up dr. Dickson.

## 2023-09-29 ENCOUNTER — OFFICE VISIT (OUTPATIENT)
Dept: HEPATOLOGY | Facility: CLINIC | Age: 33
End: 2023-09-29
Payer: MEDICAID

## 2023-09-29 VITALS — BODY MASS INDEX: 38.36 KG/M2 | WEIGHT: 315 LBS | HEIGHT: 76 IN

## 2023-09-29 DIAGNOSIS — K76.0 NAFLD (NONALCOHOLIC FATTY LIVER DISEASE): Primary | ICD-10-CM

## 2023-09-29 DIAGNOSIS — K74.01 HEPATIC FIBROSIS, EARLY FIBROSIS: ICD-10-CM

## 2023-09-29 DIAGNOSIS — E11.69 TYPE 2 DIABETES MELLITUS WITH OBESITY: ICD-10-CM

## 2023-09-29 DIAGNOSIS — E66.9 TYPE 2 DIABETES MELLITUS WITH OBESITY: ICD-10-CM

## 2023-09-29 DIAGNOSIS — R74.8 ELEVATED LIVER ENZYMES: ICD-10-CM

## 2023-09-29 PROCEDURE — 99999 PR PBB SHADOW E&M-EST. PATIENT-LVL III: CPT | Mod: PBBFAC,,, | Performed by: NURSE PRACTITIONER

## 2023-09-29 PROCEDURE — 3061F PR NEG MICROALBUMINURIA RESULT DOCUMENTED/REVIEW: ICD-10-PCS | Mod: CPTII,,, | Performed by: NURSE PRACTITIONER

## 2023-09-29 PROCEDURE — 4010F ACE/ARB THERAPY RXD/TAKEN: CPT | Mod: CPTII,,, | Performed by: NURSE PRACTITIONER

## 2023-09-29 PROCEDURE — 3044F HG A1C LEVEL LT 7.0%: CPT | Mod: CPTII,,, | Performed by: NURSE PRACTITIONER

## 2023-09-29 PROCEDURE — 99214 PR OFFICE/OUTPT VISIT, EST, LEVL IV, 30-39 MIN: ICD-10-PCS | Mod: S$PBB,,, | Performed by: NURSE PRACTITIONER

## 2023-09-29 PROCEDURE — 4010F PR ACE/ARB THEARPY RXD/TAKEN: ICD-10-PCS | Mod: CPTII,,, | Performed by: NURSE PRACTITIONER

## 2023-09-29 PROCEDURE — 3066F NEPHROPATHY DOC TX: CPT | Mod: CPTII,,, | Performed by: NURSE PRACTITIONER

## 2023-09-29 PROCEDURE — 3008F PR BODY MASS INDEX (BMI) DOCUMENTED: ICD-10-PCS | Mod: CPTII,,, | Performed by: NURSE PRACTITIONER

## 2023-09-29 PROCEDURE — 99999 PR PBB SHADOW E&M-EST. PATIENT-LVL III: ICD-10-PCS | Mod: PBBFAC,,, | Performed by: NURSE PRACTITIONER

## 2023-09-29 PROCEDURE — 3044F PR MOST RECENT HEMOGLOBIN A1C LEVEL <7.0%: ICD-10-PCS | Mod: CPTII,,, | Performed by: NURSE PRACTITIONER

## 2023-09-29 PROCEDURE — 3066F PR DOCUMENTATION OF TREATMENT FOR NEPHROPATHY: ICD-10-PCS | Mod: CPTII,,, | Performed by: NURSE PRACTITIONER

## 2023-09-29 PROCEDURE — 3008F BODY MASS INDEX DOCD: CPT | Mod: CPTII,,, | Performed by: NURSE PRACTITIONER

## 2023-09-29 PROCEDURE — 99213 OFFICE O/P EST LOW 20 MIN: CPT | Mod: PBBFAC | Performed by: NURSE PRACTITIONER

## 2023-09-29 PROCEDURE — 3061F NEG MICROALBUMINURIA REV: CPT | Mod: CPTII,,, | Performed by: NURSE PRACTITIONER

## 2023-09-29 PROCEDURE — 99214 OFFICE O/P EST MOD 30 MIN: CPT | Mod: S$PBB,,, | Performed by: NURSE PRACTITIONER

## 2023-09-29 NOTE — PROGRESS NOTES
[facility] - Established Patient    Last Clinic Visit: 9/20/22    Chief Complaint: Follow-up for fatty liver        HISTORY     This is a 33 y.o. male with PMH noted below, here for follow-up of fatty liver.    Fatty liver first noted on abd US 9/6/22. Liver enlarged at 23 cm. No findings to suggest advanced liver disease.     His transaminases have been intermittently elevated since 2019, ALT>AST, 40-60s. Enzymes trending up with weight gain and higher blood sugar.    Serologic workup has been negative for Albino's, alpha-1 antitrypsin deficiency, hemochromatosis, autoimmune etiology, and viral hepatitis. Ferritin >500 though iron sat WNL.     Fibrosis staging:   Fibroscan 9/2022 = F2 (kPa 7.7), S3 ()    Interval history:  Here today with Mom.  Feels well today, no concerns.    Denies symptoms of hepatic decompensation including jaundice, ascites, cognitive problems to suggest hepatic encephalopathy, or GI bleeding.     Updates on risk factors for fatty liver:  Weight -- Body mass index is 38.64 kg/m².    Weight is down >15 lb over the last 6 months   Mom is trying to make dietary changes  Dyslipidemia -- well controlled  On statin                                Insulin resistance / diabetes -- newly diagnosed though HgbA1c improved to 5.8    On ozempic    Alcohol use -- none     Health Maintenance:  -- Last imaging: abd US 9/6/22 without focal hepatic lesion  -- Hepatitis A & B vaccination: needs vaccines       Past medical history, surgical history, problem list, family history, social history, allergies: Reviewed and updated in the appropriate section of the electronic medical record.      Current Outpatient Medications   Medication Sig Dispense Refill    cholecalciferol, vitamin D3, (VITAMIN D3) 50 mcg (2,000 unit) Cap Take 1 capsule by mouth once daily.      EScitalopram oxalate (LEXAPRO) 20 MG tablet Take 1 tablet (20 mg total) by mouth once daily. 30 tablet 5    losartan (COZAAR) 25 MG  tablet Take 1 tablet by mouth once daily 90 tablet 0    multivitamin capsule Take 1 capsule by mouth once daily.      ondansetron (ZOFRAN-ODT) 8 MG TbDL Take 1 tablet (8 mg total) by mouth every 12 (twelve) hours as needed (nausea or vomiting). 30 tablet 0    pantoprazole (PROTONIX) 40 MG tablet Take 1 tablet by mouth once daily 90 tablet 0    propranoloL (INDERAL LA) 60 MG 24 hr capsule Take 1 capsule (60 mg total) by mouth once daily. 90 capsule 3    rosuvastatin (CRESTOR) 40 MG Tab Take 1 tablet (40 mg total) by mouth once daily. 90 tablet 0    semaglutide (OZEMPIC) 0.25 mg or 0.5 mg (2 mg/3 mL) pen injector Inject 0.5 mg into the skin every 7 days. 1 each 6    topiramate (TOPAMAX) 100 MG tablet Take 1 tablet (100 mg total) by mouth every evening. 30 tablet 11     No current facility-administered medications for this visit.     Medication list reviewed and updated.      Review of Systems - as per HPI  Constitutional: Negative for fatigue or unexpected weight change.   Respiratory: Negative for shortness of breath.    Cardiovascular: Negative for leg swelling.  Gastrointestinal: Negative for abdominal distention or abdominal pain. Negative for melena or hematemesis.  Musculoskeletal: Negative for myalgias.    Skin: Negative for jaundice or itching.  Neurological: Negative for confusion or slowed mentation. Negative for tremors.   Hematological: Does not bruise/bleed easily.   Psychiatric: Negative for sleep disturbance.      Physical Exam   Constitutional: Well-nourished. No distress. Alert and oriented.  Eyes: No scleral icterus.   Pulmonary/Chest: Respiratory effort normal. No respiratory distress.   Abdominal: No distension, no ascites appreciated.   Extremities: No edema.   Neurological: No tremor.   Skin: No jaundice. No spider telangiectasias.  Psychiatric: Normal mood and affect. Speech, behavior, and thought content normal.         LABS & DIAGNOSTIC STUDIES     I have personally reviewed pertinent  "laboratory findings:    Lab Results   Component Value Date    ALT 92 (H) 09/25/2023    AST 43 (H) 09/25/2023    ALKPHOS 39 (L) 09/25/2023    BILITOT 0.8 09/25/2023    ALBUMIN 4.7 09/25/2023       Lab Results   Component Value Date    WBC 5.93 08/08/2022    HGB 11.2 (L) 08/08/2022    HCT 36.9 (L) 08/08/2022    MCV 72 (L) 08/08/2022     08/08/2022       Lab Results   Component Value Date     09/25/2023    K 3.8 09/25/2023    BUN 13 09/25/2023    CREATININE 1.2 09/25/2023    ESTGFRAFRICA >60 12/23/2021    EGFRNONAA >60 12/23/2021       Lab Results   Component Value Date    SMOOTHMUSCAB Negative 1:40 09/06/2022    IGGSERUM 1007 09/06/2022    ANASCREEN Negative <1:80 09/06/2022    FERRITIN 510 (H) 07/28/2021    FESATURATED 23 07/28/2021    CERULOPLSM 26.0 09/06/2022    HEPBSAG Negative 03/05/2020    HEPBIGM Negative 03/05/2020    HEPBCAB Non-reactive 09/06/2022    HEPCAB Negative 03/05/2020    HEPAIGM Negative 03/05/2020       No results found for: "AFP"    I have personally reviewed the following result reports:  Abdominal US - 9/6/22      ASSESSMENT & PLAN     33 y.o. male with:    1. NAFLD with hepatic fibrosis  -- Commended on weight loss success and improved blood sugar control. Anticipate that enzymes start to trend down as he has more success with this.   -- Recommend labs to monitor liver enzymes/LFTs 1-2 times per year with PCP. Will repeat in 6 months.   -- Fibroscan previously suggested moderate fibrosis, F2, repeat in 6 months + US.  -- Recommend hep A/B vaccines    Reminded that the only treatment for fatty liver is weight loss, maintaining good control of metabolic risk factors (blood pressure, cholesterol, and blood sugar), and moderating alcohol use.      2. Body mass index is 38.64 kg/m²., HLD, DM  -- Recommend low carbohydrate/sugar, high protein/fiber diet. Recommend long-term weight loss goal of 10% (about 30 lb).   -- Now on ozempic       Orders Placed This Encounter   Procedures    " FibroScan [place] (Vibration Controlled Transient Elastography)    US Abdomen Limited       *See AVS for patient education and instructions.      Return to clinic in 6 months with labs, US, and Fibroscan.      Thank you for allowing me to participate in the care of Seun Wynne    Sara Bunch, FNP-C  Hepatology        Duration of encounter: 30 min  This includes face to face time and non-face to face time preparing to see the patient (eg, review of tests), obtaining and/or reviewing separately obtained history, documenting clinical information in the electronic or other health record, independently interpreting results and communicating results to the patient/family/caregiver, or care coordination.

## 2023-09-29 NOTE — PATIENT INSTRUCTIONS
Labs in 6 months  Keep up the great work with weight loss and good blood sugar control  Repeat ultrasound and Fibroscan in 6-12 months

## 2023-10-05 ENCOUNTER — OFFICE VISIT (OUTPATIENT)
Dept: NEUROLOGY | Facility: CLINIC | Age: 33
End: 2023-10-05
Payer: MEDICAID

## 2023-10-05 VITALS
DIASTOLIC BLOOD PRESSURE: 75 MMHG | HEIGHT: 76 IN | HEART RATE: 66 BPM | WEIGHT: 315 LBS | BODY MASS INDEX: 38.36 KG/M2 | SYSTOLIC BLOOD PRESSURE: 112 MMHG

## 2023-10-05 DIAGNOSIS — G43.709 CHRONIC MIGRAINE WITHOUT AURA WITHOUT STATUS MIGRAINOSUS, NOT INTRACTABLE: Primary | ICD-10-CM

## 2023-10-05 DIAGNOSIS — F84.0 AUTISM SPECTRUM DISORDER: ICD-10-CM

## 2023-10-05 PROCEDURE — 4010F PR ACE/ARB THEARPY RXD/TAKEN: ICD-10-PCS | Mod: CPTII,,, | Performed by: PSYCHIATRY & NEUROLOGY

## 2023-10-05 PROCEDURE — 99213 OFFICE O/P EST LOW 20 MIN: CPT | Mod: S$PBB,,, | Performed by: PSYCHIATRY & NEUROLOGY

## 2023-10-05 PROCEDURE — 3008F PR BODY MASS INDEX (BMI) DOCUMENTED: ICD-10-PCS | Mod: CPTII,,, | Performed by: PSYCHIATRY & NEUROLOGY

## 2023-10-05 PROCEDURE — 3078F PR MOST RECENT DIASTOLIC BLOOD PRESSURE < 80 MM HG: ICD-10-PCS | Mod: CPTII,,, | Performed by: PSYCHIATRY & NEUROLOGY

## 2023-10-05 PROCEDURE — 3066F PR DOCUMENTATION OF TREATMENT FOR NEPHROPATHY: ICD-10-PCS | Mod: CPTII,,, | Performed by: PSYCHIATRY & NEUROLOGY

## 2023-10-05 PROCEDURE — 1159F MED LIST DOCD IN RCRD: CPT | Mod: CPTII,,, | Performed by: PSYCHIATRY & NEUROLOGY

## 2023-10-05 PROCEDURE — 1160F RVW MEDS BY RX/DR IN RCRD: CPT | Mod: CPTII,,, | Performed by: PSYCHIATRY & NEUROLOGY

## 2023-10-05 PROCEDURE — 4010F ACE/ARB THERAPY RXD/TAKEN: CPT | Mod: CPTII,,, | Performed by: PSYCHIATRY & NEUROLOGY

## 2023-10-05 PROCEDURE — 3044F HG A1C LEVEL LT 7.0%: CPT | Mod: CPTII,,, | Performed by: PSYCHIATRY & NEUROLOGY

## 2023-10-05 PROCEDURE — 3061F PR NEG MICROALBUMINURIA RESULT DOCUMENTED/REVIEW: ICD-10-PCS | Mod: CPTII,,, | Performed by: PSYCHIATRY & NEUROLOGY

## 2023-10-05 PROCEDURE — 3074F SYST BP LT 130 MM HG: CPT | Mod: CPTII,,, | Performed by: PSYCHIATRY & NEUROLOGY

## 2023-10-05 PROCEDURE — 1159F PR MEDICATION LIST DOCUMENTED IN MEDICAL RECORD: ICD-10-PCS | Mod: CPTII,,, | Performed by: PSYCHIATRY & NEUROLOGY

## 2023-10-05 PROCEDURE — 99999 PR PBB SHADOW E&M-EST. PATIENT-LVL III: ICD-10-PCS | Mod: PBBFAC,,, | Performed by: PSYCHIATRY & NEUROLOGY

## 2023-10-05 PROCEDURE — 3074F PR MOST RECENT SYSTOLIC BLOOD PRESSURE < 130 MM HG: ICD-10-PCS | Mod: CPTII,,, | Performed by: PSYCHIATRY & NEUROLOGY

## 2023-10-05 PROCEDURE — 99213 OFFICE O/P EST LOW 20 MIN: CPT | Mod: PBBFAC,PO | Performed by: PSYCHIATRY & NEUROLOGY

## 2023-10-05 PROCEDURE — 3008F BODY MASS INDEX DOCD: CPT | Mod: CPTII,,, | Performed by: PSYCHIATRY & NEUROLOGY

## 2023-10-05 PROCEDURE — 3061F NEG MICROALBUMINURIA REV: CPT | Mod: CPTII,,, | Performed by: PSYCHIATRY & NEUROLOGY

## 2023-10-05 PROCEDURE — 3044F PR MOST RECENT HEMOGLOBIN A1C LEVEL <7.0%: ICD-10-PCS | Mod: CPTII,,, | Performed by: PSYCHIATRY & NEUROLOGY

## 2023-10-05 PROCEDURE — 3078F DIAST BP <80 MM HG: CPT | Mod: CPTII,,, | Performed by: PSYCHIATRY & NEUROLOGY

## 2023-10-05 PROCEDURE — 1160F PR REVIEW ALL MEDS BY PRESCRIBER/CLIN PHARMACIST DOCUMENTED: ICD-10-PCS | Mod: CPTII,,, | Performed by: PSYCHIATRY & NEUROLOGY

## 2023-10-05 PROCEDURE — 99213 PR OFFICE/OUTPT VISIT, EST, LEVL III, 20-29 MIN: ICD-10-PCS | Mod: S$PBB,,, | Performed by: PSYCHIATRY & NEUROLOGY

## 2023-10-05 PROCEDURE — 99999 PR PBB SHADOW E&M-EST. PATIENT-LVL III: CPT | Mod: PBBFAC,,, | Performed by: PSYCHIATRY & NEUROLOGY

## 2023-10-05 PROCEDURE — 3066F NEPHROPATHY DOC TX: CPT | Mod: CPTII,,, | Performed by: PSYCHIATRY & NEUROLOGY

## 2023-10-05 RX ORDER — TOPIRAMATE 100 MG/1
100 TABLET, FILM COATED ORAL NIGHTLY
Qty: 30 TABLET | Refills: 11 | Status: SHIPPED | OUTPATIENT
Start: 2023-10-05 | End: 2023-10-25 | Stop reason: SDUPTHER

## 2023-10-05 NOTE — PROGRESS NOTES
[facility] NEUROLOGY  [facility], [place]    Date: 10/5/23  Patient Name: Seun Wynne   MRN: [id number]   PCP: Karla Arce  Referring Provider: No ref. provider found    Chief Complaint: Follow up for headaches  Subjective:      10/05/23:  Patient once again presents alongside his mother for follow-up.  She reports the tolerated increase in topiramate well and then a separate increase to now 75 mg daily regimen.  No side effects or complaints.  Though they are unable to provide a specific answer on total headache days, the patient expresses that he is not having headache daily.  When questioned about severe headache days, mother estimates 0 total severe days in last month based on his request for additional acute medication.  Most headaches seem to be in the morning hours.  Denies snoring, witnessed apnea.    =================================================  This patient has been evaluated multiple times in our clinic including by indira Pan pa-C.  Extensive chart review conducted prior to today's encounter.    04/12/22 HPI:  Mr. Wynne presents today to discuss his ongoing uncontrolled headaches.  He is joined by his mother at the time today's encounter who provides much of the history. Seun was helpful in providing some specifics of the headache qualities but his mother was a valuable resource in collecting facts about over-the-counter medication use and other elements of history.  Clinical encounter consistent with the patient's hx.    Age of onset: 29  When did they become more of a problem: 28 YO  # of Total headache days per month: 20-25  # of Migraine days per month: 10+  Intensity of average and most severe headaches: 3-5/10, 7-8/10  Duration of headache pain: >4 hrs untreated  Quality of pain: Pulsating/Throbbing  Laterality: bilateral  Location: frontal   Photophobia and phonophobia: no  Nausea and vomiting: yes  Causes avoidance of physical activity: yes  Worsened  "by physical activity: yes  Preventive Medications failed: none  Acute medications failed: renu  OTC Medications: tylenol (30 days per month, over a year)  Hx of (Bolded items are positive): depression, anxiety, fibromyalgia, autoimmune disorder, head trauma, neurological infection, glaucoma, asthma, nephrolithiasis, MI, stroke  Is medication overuse contributing to the patient's current migraine burden: YES  Aura: no  Autonomic symptoms: no    Family Hx of migraines: none    Latest Imaging: none    =================================================    PAST MEDICAL HISTORY:  Past Medical History:   Diagnosis Date    Asperger syndrome     Diabetes mellitus, type 2     "under control after weight loss"    Hyperlipidemia     Hypertension     "BEEN FINE IN RECENT YRS NEVER TOOK MEDS"    Obesity        PAST SURGICAL HISTORY:  Past Surgical History:   Procedure Laterality Date    ESOPHAGOGASTRODUODENOSCOPY N/A 6/19/2018    Procedure: ESOPHAGOGASTRODUODENOSCOPY (EGD);  Surgeon: Darell Gautam Jr., MD;  Location: [facility];  Service: Endoscopy;  Laterality: N/A;       CURRENT MEDS:  Current Outpatient Medications   Medication Sig Dispense Refill    cholecalciferol, vitamin D3, (VITAMIN D3) 50 mcg (2,000 unit) Cap Take 1 capsule by mouth once daily.      EScitalopram oxalate (LEXAPRO) 20 MG tablet Take 1 tablet (20 mg total) by mouth once daily. 30 tablet 5    losartan (COZAAR) 25 MG tablet Take 1 tablet by mouth once daily 90 tablet 0    multivitamin capsule Take 1 capsule by mouth once daily.      ondansetron (ZOFRAN-ODT) 8 MG TbDL Take 1 tablet (8 mg total) by mouth every 12 (twelve) hours as needed (nausea or vomiting). 30 tablet 0    pantoprazole (PROTONIX) 40 MG tablet Take 1 tablet by mouth once daily 90 tablet 0    propranoloL (INDERAL LA) 60 MG 24 hr capsule Take 1 capsule (60 mg total) by mouth once daily. 90 capsule 3    rosuvastatin (CRESTOR) 40 MG Tab Take 1 tablet (40 mg total) by mouth once daily. 90 tablet 0 " "   semaglutide (OZEMPIC) 0.25 mg or 0.5 mg (2 mg/3 mL) pen injector Inject 0.5 mg into the skin every 7 days. 1 each 6    topiramate (TOPAMAX) 100 MG tablet Take 1 tablet (100 mg total) by mouth every evening. 30 tablet 11     No current facility-administered medications for this visit.       ALLERGIES:  Review of patient's allergies indicates:  No Known Allergies       Objective:     Vitals:    10/05/23 1038   BP: 112/75   BP Location: Right arm   Patient Position: Sitting   Pulse: 66   Weight: (!) 144 kg (317 lb 7.4 oz)   Height: 6' 4" (1.93 m)     General: male in NAD, alert and awake, avoids eye contact    ? ?     Neurological Examination.    Mental status:  Pleasant affect, avoids eye contact, speaks in short words or statements.     Cranial Nerves: II-XII grossly intact.      Muscle Function:  Full and symmetric use of bilateral upper and lower extremities against gravity     Gait: adequate casual gait with stride length and arm swing WNL.     Assessment:   Seun Wynne is a 33 y.o. male presenting for follow up regarding migraine. Continue ER propranolol 60 mg daily. Increase topiramate to 100 mg daily.  Plan:     Problem List Items Addressed This Visit          Neuro    Chronic migraine without aura without status migrainosus, not intractable - Primary    Relevant Medications    topiramate (TOPAMAX) 100 MG tablet     Other Visit Diagnoses       Autism spectrum disorder              1. Preventive medications:  Failed:   None    Increase:  Topiramate to 100 mg daily    Continue: Propranolol to 60 mg 24 hr capsule daily     2. Acute (abortive) medications  Failed:   none    CONTINUE: Tylenol     Once again counseled extensively on the importance of avoiding use of Tylenol more than 10 days per month to avoid medication overuse headaches    3. Natural approaches to increasing brain energy and serotonin:    SAMe 200 mg a day    Vitamin B2 400 mg daily    Vitamin B12 1000 mcg daily    Getting early morning " light to increase natural serotonin, melatonin. Could also consider light therapy box, 10,000 LUX.     4. Headache prevention and acute treatment over-the counter supplements    Boswellia 800 mg 2-3 times per day, example brand Jose's, natural anti inflammatory herb    Sleep? modulation- melatonin at night 5 to 10 mg 30 minutes prior to sleep    Feverfew Tea 1-3 cups per day. Can get from [employer] or [employer]- A Natural anti inflammatory approach that does not cause further sensitization of the system.    Magnesium Citrate 250 mg daily, slowly increase up to 500-1000 mg daily. Side effect may include diarrhea, so we recommend stopping at whatever dose is comfortable for your body without causing this side effect. This supplement can be very helpful for preventing headache, preventing migraine aura, calming nerves, muscles and even mood. Recommend starting slowly, as it can also lead to increased bowel movements or diarrhea.?     Follow up with Neurology in 6-12 months or sooner as needed    I spent a total of 20 minutes on the day of the visit. This includes face to face time and non-face to face time preparing to see the patient (eg, review of tests), obtaining and/or reviewing separately obtained history, documenting clinical information in the electronic or other health record, independently interpreting results and communicating results to the patient/family/caregiver, or care coordinator.

## 2023-10-23 DIAGNOSIS — I10 ESSENTIAL HYPERTENSION: ICD-10-CM

## 2023-10-23 NOTE — TELEPHONE ENCOUNTER
No care due was identified.  [facility] Embedded Care Due Messages. Reference number: [id number].   10/23/2023 9:49:42 AM CDT

## 2023-10-24 RX ORDER — LOSARTAN POTASSIUM 25 MG/1
25 TABLET ORAL
Qty: 90 TABLET | Refills: 0 | Status: SHIPPED | OUTPATIENT
Start: 2023-10-24 | End: 2024-01-22

## 2023-10-24 NOTE — TELEPHONE ENCOUNTER
Refill Decision Note   Seun Ricci  is requesting a refill authorization.  Brief Assessment and Rationale for Refill:  Approve     Medication Therapy Plan:         Comments:     Note composed:8:00 AM 10/24/2023

## 2023-10-25 DIAGNOSIS — G43.709 CHRONIC MIGRAINE WITHOUT AURA WITHOUT STATUS MIGRAINOSUS, NOT INTRACTABLE: ICD-10-CM

## 2023-10-25 DIAGNOSIS — I10 ESSENTIAL HYPERTENSION: ICD-10-CM

## 2023-10-25 RX ORDER — LOSARTAN POTASSIUM 25 MG/1
25 TABLET ORAL
Qty: 90 TABLET | Refills: 0 | Status: CANCELLED | OUTPATIENT
Start: 2023-10-25

## 2023-10-26 RX ORDER — TOPIRAMATE 100 MG/1
100 TABLET, FILM COATED ORAL NIGHTLY
Qty: 30 TABLET | Refills: 11 | Status: SHIPPED | OUTPATIENT
Start: 2023-10-26 | End: 2024-10-25

## 2023-10-26 NOTE — TELEPHONE ENCOUNTER
I spoke to pt's mother Rx Losartan was sent to the pharmacy on 10-24-23.  I will cancel this request  She verbalized understanding

## 2023-11-10 DIAGNOSIS — E78.2 MIXED HYPERLIPIDEMIA: ICD-10-CM

## 2023-11-10 NOTE — TELEPHONE ENCOUNTER
No care due was identified.  Health [facility] Embedded Care Due Messages. Reference number: [id number].   11/10/2023 9:35:16 AM CST

## 2023-11-13 ENCOUNTER — PATIENT OUTREACH (OUTPATIENT)
Dept: ADMINISTRATIVE | Facility: HOSPITAL | Age: 33
End: 2023-11-13
Payer: MEDICAID

## 2023-11-13 ENCOUNTER — PATIENT MESSAGE (OUTPATIENT)
Dept: ADMINISTRATIVE | Facility: HOSPITAL | Age: 33
End: 2023-11-13
Payer: MEDICAID

## 2023-11-13 RX ORDER — ROSUVASTATIN CALCIUM 40 MG/1
TABLET, COATED ORAL
Qty: 90 TABLET | Refills: 0 | Status: SHIPPED | OUTPATIENT
Start: 2023-11-13 | End: 2024-02-08

## 2023-11-13 NOTE — PROGRESS NOTES
Population Health Chart Review & Patient Outreach Details    Chart reviewed  Immunizations reconciled   Left message regarding eye exam  Further Action Needed If Patient Returns Outreach:        Health Maintenance Due   Topic Date Due    Pneumococcal Vaccines (Age 0-64) (1 - PCV) Never done    HIV Screening  Never done    Eye Exam  2022    COVID-19 Vaccine (2023-24 season) 2023             Updates Requested / Reviewed:     [x]  Care Everywhere    []     []  External Sources ([facility], [facility], DIS, etc.)    [] [facility]   [] [facility]   [] Other:    []  Care Team Updated   []  Removed  or Duplicate Orders   [x]  Immunization Reconciliation Completed / Queried    [] [place]   [] [place]   [] [place]   [] [place]      Health Maintenance Topics Addressed and Outreach Outcomes / Actions Taken:             Breast Cancer Screening []  Mammogram Order Placed    []  Mammogram Screening Scheduled    []  External Records Requested & Care Team Updated if Applicable    []  External Records Uploaded & Care Team Updated if Applicable    []  Pt Declined Scheduling Mammogram    []  Pt Will Schedule with External Provider / Order Routed & Care Team Updated if Applicable              Cervical Cancer Screening []  Pap Smear Scheduled in Primary Care or OBGYN    []  External Records Requested & Care Team Updated if Applicable       []  External Records Uploaded, Care Team Updated, & History Updated if Applicable    []  Patient Declined Scheduling Pap Smear    []  Patient Will Schedule with External Provider & Care Team Updated if Applicable                  Colorectal Cancer Screening []  Colonoscopy Case Request / Referral / Home Test Order Placed    []  External Records Requested & Care Team Updated if Applicable    []  External Records Uploaded, Care Team Updated, & History Updated if Applicable    []  Patient Declined Completing Colon Cancer Screening    []  Patient Will Schedule with External  Provider & Care Team Updated if Applicable    []  Fit Kit Mailed (add the SmartPhrase under additional notes)    []  Reminded Patient to Complete Home Test                Diabetic Eye Exam []  Eye Exam Screening Order Placed    []  Eye Camera Scheduled or Optometry/Ophthalmology Referral Placed    []  External Records Requested & Care Team Updated if Applicable    []  External Records Uploaded, Care Team Updated, & History Updated if Applicable    []  Patient Declined Scheduling Eye Exam    []  Patient Will Schedule with External Provider & Care Team Updated if Applicable             Blood Pressure Control []  Primary Care Follow Up Visit Scheduled     []  Remote Blood Pressure Reading Captured    []  Patient Declined Remote Reading or Scheduling Appt - Escalated to PCP    []  Patient Will Call Back or Send Portal Message with Reading                 HbA1c & Other Labs []  Overdue Lab(s) Ordered    []  Overdue Lab(s) Scheduled    []  External Records Uploaded & Care Team Updated if Applicable    []  Primary Care Follow Up Visit Scheduled     []  Reminded Patient to Complete A1c Home Test    []  Patient Declined Scheduling Labs or Will Call Back to Schedule    []  Patient Will Schedule with External Provider / Order Routed, & Care Team Updated if Applicable           Primary Care Appointment []  Primary Care Appt Scheduled    []  Patient Declined Scheduling or Will Call Back to Schedule    []  Pt Established with External Provider, Updated Care Team, & Informed Pt to Notify Payor if Applicable           Medication Adherence /    Statin Use []  Primary Care Appointment Scheduled    []  Patient Reminded to  Prescription    []  Patient Declined, Provider Notified if Needed    []  Sent Provider Message to Review to Evaluate Pt for Statin, Add Exclusion Dx Codes, Document   Exclusion in Problem List, Change Statin Intensity Level to Moderate or High Intensity if Applicable                Osteoporosis Screening []   Dexa Order Placed    []  Dexa Appointment Scheduled    []  External Records Requested & Care Team Updated    []  External Records Uploaded, Care Team Updated, & History Updated if Applicable    []  Patient Declined Scheduling Dexa or Will Call Back to Schedule    []  Patient Will Schedule with External Provider / Order Routed & Care Team Updated if Applicable       Additional Notes:

## 2023-11-15 DIAGNOSIS — E78.2 MIXED HYPERLIPIDEMIA: ICD-10-CM

## 2023-11-16 ENCOUNTER — TELEPHONE (OUTPATIENT)
Dept: PSYCHIATRY | Facility: CLINIC | Age: 33
End: 2023-11-16
Payer: MEDICAID

## 2023-11-16 DIAGNOSIS — F84.0 AUTISM SPECTRUM DISORDER: ICD-10-CM

## 2023-11-16 RX ORDER — ESCITALOPRAM OXALATE 20 MG/1
20 TABLET ORAL DAILY
Qty: 30 TABLET | Refills: 1 | Status: SHIPPED | OUTPATIENT
Start: 2023-11-16 | End: 2024-01-19 | Stop reason: SDUPTHER

## 2023-11-16 RX ORDER — ROSUVASTATIN CALCIUM 40 MG/1
TABLET, COATED ORAL
Qty: 90 TABLET | Refills: 0 | OUTPATIENT
Start: 2023-11-16

## 2023-11-16 NOTE — TELEPHONE ENCOUNTER
No care due was identified.  [facility] Embedded Care Due Messages. Reference number: [id number].   11/15/2023 11:19:31 PM CST

## 2023-11-16 NOTE — TELEPHONE ENCOUNTER
Former pt of Dr. Trejo requesting Lexapro refill. L/s 12/2022 when plan made to continue meds unchanged. They have been referred to the community to find a new prescriber, but per mother, they are still trying to find one to see pt. Will consult with Dr. Jackson for review/approval of temporary supply of requested med in the interim.

## 2023-11-29 ENCOUNTER — PATIENT MESSAGE (OUTPATIENT)
Dept: INTERNAL MEDICINE | Facility: CLINIC | Age: 33
End: 2023-11-29
Payer: MEDICAID

## 2023-12-07 ENCOUNTER — PATIENT MESSAGE (OUTPATIENT)
Dept: INTERNAL MEDICINE | Facility: CLINIC | Age: 33
End: 2023-12-07
Payer: MEDICAID

## 2023-12-07 VITALS — DIASTOLIC BLOOD PRESSURE: 76 MMHG | SYSTOLIC BLOOD PRESSURE: 125 MMHG

## 2023-12-07 DIAGNOSIS — R12 HEARTBURN: ICD-10-CM

## 2023-12-07 RX ORDER — PANTOPRAZOLE SODIUM 40 MG/1
40 TABLET, DELAYED RELEASE ORAL
Qty: 90 TABLET | Refills: 0 | Status: CANCELLED | OUTPATIENT
Start: 2023-12-07

## 2023-12-07 NOTE — TELEPHONE ENCOUNTER
No care due was identified.  [facility] Embedded Care Due Messages. Reference number: [id number].   12/07/2023 9:10:18 AM CST

## 2023-12-09 ENCOUNTER — PATIENT MESSAGE (OUTPATIENT)
Dept: INTERNAL MEDICINE | Facility: CLINIC | Age: 33
End: 2023-12-09
Payer: MEDICAID

## 2023-12-09 DIAGNOSIS — R12 HEARTBURN: ICD-10-CM

## 2023-12-09 NOTE — TELEPHONE ENCOUNTER
No care due was identified.  Health [facility] Embedded Care Due Messages. Reference number: [id number].   12/09/2023 3:08:48 PM CST

## 2023-12-11 RX ORDER — PANTOPRAZOLE SODIUM 40 MG/1
40 TABLET, DELAYED RELEASE ORAL
Qty: 90 TABLET | Refills: 0 | OUTPATIENT
Start: 2023-12-11

## 2024-01-19 ENCOUNTER — PATIENT MESSAGE (OUTPATIENT)
Dept: INTERNAL MEDICINE | Facility: CLINIC | Age: 34
End: 2024-01-19
Payer: MEDICAID

## 2024-01-19 DIAGNOSIS — F84.0 AUTISM SPECTRUM DISORDER: ICD-10-CM

## 2024-01-19 NOTE — TELEPHONE ENCOUNTER
No care due was identified.  [facility] Embedded Care Due Messages. Reference number: [id number].   1/19/2024 2:59:13 PM CST

## 2024-01-20 RX ORDER — ESCITALOPRAM OXALATE 20 MG/1
20 TABLET ORAL DAILY
Qty: 30 TABLET | Refills: 1 | OUTPATIENT
Start: 2024-01-20

## 2024-01-21 DIAGNOSIS — I10 ESSENTIAL HYPERTENSION: ICD-10-CM

## 2024-01-21 NOTE — TELEPHONE ENCOUNTER
No care due was identified.  Health [facility] Embedded Care Due Messages. Reference number: [id number].   1/19/2024 4:01:03 PM CST  
Please see pt message about requesting refill from you  LOV:9/27/23  LRF:11/16/23  RTC:none  
Refill Routing Note   Medication(s) are not appropriate for processing by [facility] for the following reason(s):        Patient not seen by provider within 15 months  No active prescription written by provider    [facility] action(s):  Defer             Appointments  past 12m or future 3m with PCP    Date Provider   Last Visit   8/15/2022 Karla Arce MD   Next Visit   Visit date not found Karla Arce MD   ED visits in past 90 days: 0        Note composed:11:17 PM 01/20/2024         
PROCEDURES:  Fusion of 13 or more spinal segments by posterior approach 04-Sep-2019 15:50:31 T2-L5 posterior spinal fusion Keo Conway

## 2024-01-21 NOTE — TELEPHONE ENCOUNTER
No care due was identified.  Health [facility] Embedded Care Due Messages. Reference number: [id number].   1/21/2024 11:16:31 AM CST

## 2024-01-21 NOTE — TELEPHONE ENCOUNTER
Quick DC. Duplicate Request-  med pended in previous encounter, awaiting assessment    Refill Authorization Note   Seun Wynne  is requesting a refill authorization.  Brief Assessment and Rationale for Refill:  Quick Discontinue  Medication Therapy Plan:       Medication Reconciliation Completed:  No      Comments:     Note composed:11:17 PM 01/20/2024

## 2024-01-22 RX ORDER — ESCITALOPRAM OXALATE 20 MG/1
20 TABLET ORAL DAILY
Qty: 90 TABLET | Refills: 0 | Status: SHIPPED | OUTPATIENT
Start: 2024-01-22 | End: 2024-05-23

## 2024-01-22 RX ORDER — LOSARTAN POTASSIUM 25 MG/1
25 TABLET ORAL
Qty: 90 TABLET | Refills: 0 | Status: SHIPPED | OUTPATIENT
Start: 2024-01-22 | End: 2024-04-19

## 2024-02-08 DIAGNOSIS — E78.2 MIXED HYPERLIPIDEMIA: ICD-10-CM

## 2024-02-08 RX ORDER — ROSUVASTATIN CALCIUM 40 MG/1
TABLET, COATED ORAL
Qty: 90 TABLET | Refills: 0 | Status: SHIPPED | OUTPATIENT
Start: 2024-02-08 | End: 2024-02-12

## 2024-02-08 NOTE — TELEPHONE ENCOUNTER
No care due was identified.  Health [facility] Embedded Care Due Messages. Reference number: [id number].   2/08/2024 11:53:01 AM CST

## 2024-02-09 DIAGNOSIS — Z00.00 ANNUAL PHYSICAL EXAM: Primary | ICD-10-CM

## 2024-02-09 NOTE — TELEPHONE ENCOUNTER
No care due was identified.  Health [facility] Embedded Care Due Messages. Reference number: [id number].   2/08/2024 6:27:32 PM CST

## 2024-02-12 RX ORDER — ROSUVASTATIN CALCIUM 40 MG/1
TABLET, COATED ORAL
Qty: 90 TABLET | Refills: 0 | Status: SHIPPED | OUTPATIENT
Start: 2024-02-12 | End: 2024-05-23 | Stop reason: SDUPTHER

## 2024-02-29 DIAGNOSIS — R12 HEARTBURN: ICD-10-CM

## 2024-02-29 RX ORDER — PANTOPRAZOLE SODIUM 40 MG/1
40 TABLET, DELAYED RELEASE ORAL
Qty: 90 TABLET | Refills: 0 | Status: SHIPPED | OUTPATIENT
Start: 2024-02-29 | End: 2024-03-08 | Stop reason: SDUPTHER

## 2024-02-29 NOTE — TELEPHONE ENCOUNTER
No care due was identified.  [facility] Embedded Care Due Messages. Reference number: [id number].   2/29/2024 11:12:40 AM CST

## 2024-02-29 NOTE — TELEPHONE ENCOUNTER
Refill Routing Note   Medication(s) are not appropriate for processing by [facility] for the following reason(s):        Patient not seen by provider within 15 months    [facility] action(s):  Defer               Appointments  past 12m or future 3m with PCP    Date Provider   Last Visit   8/15/2022 Karla Arce MD   Next Visit   5/17/2024 Krala Arce MD   ED visits in past 90 days: 0        Note composed:1:33 PM 02/29/2024

## 2024-03-01 ENCOUNTER — PATIENT MESSAGE (OUTPATIENT)
Dept: ADMINISTRATIVE | Facility: HOSPITAL | Age: 34
End: 2024-03-01
Payer: MEDICAID

## 2024-03-01 ENCOUNTER — PATIENT OUTREACH (OUTPATIENT)
Dept: ADMINISTRATIVE | Facility: HOSPITAL | Age: 34
End: 2024-03-01
Payer: MEDICAID

## 2024-03-01 NOTE — PROGRESS NOTES
Population Health Chart Review & Patient Outreach Details      Further Action Needed If Patient Returns Outreach:        Health Maintenance Due   Topic Date Due    Pneumococcal Vaccines (Age 0-64) (1 of 2 - PCV) Never done    HIV Screening  Never done    Eye Exam  2022    COVID-19 Vaccine (2023-24 season) 2023    Hemoglobin A1c  2024         Updates Requested / Reviewed:     []  Care Everywhere    []     []  External Sources ([facility], [facility], DIS, etc.)    [] [facility]   [] [facility]   [] Other:    []  Care Team Updated   []  Removed  or Duplicate Orders   []  Immunization Reconciliation Completed / Queried    [] [place]   [] [place]   [] [place]   [] [place]      Health Maintenance Topics Addressed and Outreach Outcomes / Actions Taken:             Breast Cancer Screening []  Mammogram Order Placed    []  Mammogram Screening Scheduled    []  External Records Requested & Care Team Updated if Applicable    []  External Records Uploaded & Care Team Updated if Applicable    []  Pt Declined Scheduling Mammogram    []  Pt Will Schedule with External Provider / Order Routed & Care Team Updated if Applicable              Cervical Cancer Screening []  Pap Smear Scheduled in Primary Care or OBGYN    []  External Records Requested & Care Team Updated if Applicable       []  External Records Uploaded, Care Team Updated, & History Updated if Applicable    []  Patient Declined Scheduling Pap Smear    []  Patient Will Schedule with External Provider & Care Team Updated if Applicable                  Colorectal Cancer Screening []  Colonoscopy Case Request / Referral / Home Test Order Placed    []  External Records Requested & Care Team Updated if Applicable    []  External Records Uploaded, Care Team Updated, & History Updated if Applicable    []  Patient Declined Completing Colon Cancer Screening    []  Patient Will Schedule with External Provider & Care Team Updated if Applicable    []   Fit Kit Mailed (add the SmartPhrase under additional notes)    []  Reminded Patient to Complete Home Test                Diabetic Eye Exam []  Eye Exam Screening Order Placed    []  Eye Camera Scheduled or Optometry/Ophthalmology Referral Placed    []  External Records Requested & Care Team Updated if Applicable    []  External Records Uploaded, Care Team Updated, & History Updated if Applicable    [x]  Patient Declined Scheduling Eye Exam    []  Patient Will Schedule with External Provider & Care Team Updated if Applicable             Blood Pressure Control []  Primary Care Follow Up Visit Scheduled     []  Remote Blood Pressure Reading Captured    []  Patient Declined Remote Reading or Scheduling Appt - Escalated to PCP    []  Patient Will Call Back or Send Portal Message with Reading                 HbA1c & Other Labs []  Overdue Lab(s) Ordered    []  Overdue Lab(s) Scheduled    []  External Records Uploaded & Care Team Updated if Applicable    []  Primary Care Follow Up Visit Scheduled     []  Reminded Patient to Complete A1c Home Test    []  Patient Declined Scheduling Labs or Will Call Back to Schedule    []  Patient Will Schedule with External Provider / Order Routed, & Care Team Updated if Applicable           Primary Care Appointment []  Primary Care Appt Scheduled    []  Patient Declined Scheduling or Will Call Back to Schedule    []  Pt Established with External Provider, Updated Care Team, & Informed Pt to Notify Payor if Applicable           Medication Adherence /    Statin Use []  Primary Care Appointment Scheduled    []  Patient Reminded to  Prescription    []  Patient Declined, Provider Notified if Needed    []  Sent Provider Message to Review to Evaluate Pt for Statin, Add Exclusion Dx Codes, Document   Exclusion in Problem List, Change Statin Intensity Level to Moderate or High Intensity if Applicable                Osteoporosis Screening []  Dexa Order Placed    []  Dexa Appointment  Scheduled    []  External Records Requested & Care Team Updated    []  External Records Uploaded, Care Team Updated, & History Updated if Applicable    []  Patient Declined Scheduling Dexa or Will Call Back to Schedule    []  Patient Will Schedule with External Provider / Order Routed & Care Team Updated if Applicable       Additional Notes:     Left message regatding eye exam due

## 2024-03-08 ENCOUNTER — LAB VISIT (OUTPATIENT)
Dept: LAB | Facility: HOSPITAL | Age: 34
End: 2024-03-08
Payer: MEDICAID

## 2024-03-08 DIAGNOSIS — E11.69 TYPE 2 DIABETES MELLITUS WITH OBESITY: ICD-10-CM

## 2024-03-08 DIAGNOSIS — E66.9 TYPE 2 DIABETES MELLITUS WITH OBESITY: ICD-10-CM

## 2024-03-08 DIAGNOSIS — E78.2 MIXED HYPERLIPIDEMIA: ICD-10-CM

## 2024-03-08 DIAGNOSIS — R12 HEARTBURN: ICD-10-CM

## 2024-03-08 LAB
ALBUMIN SERPL BCP-MCNC: 4.3 G/DL (ref 3.5–5.2)
ALP SERPL-CCNC: 41 U/L (ref 55–135)
ALT SERPL W/O P-5'-P-CCNC: 50 U/L (ref 10–44)
ANION GAP SERPL CALC-SCNC: 16 MMOL/L (ref 8–16)
AST SERPL-CCNC: 33 U/L (ref 10–40)
BILIRUB SERPL-MCNC: 0.4 MG/DL (ref 0.1–1)
BUN SERPL-MCNC: 11 MG/DL (ref 6–20)
CALCIUM SERPL-MCNC: 9.5 MG/DL (ref 8.7–10.5)
CHLORIDE SERPL-SCNC: 105 MMOL/L (ref 95–110)
CHOLEST SERPL-MCNC: 192 MG/DL (ref 120–199)
CHOLEST/HDLC SERPL: 4.6 {RATIO} (ref 2–5)
CO2 SERPL-SCNC: 22 MMOL/L (ref 23–29)
CREAT SERPL-MCNC: 1 MG/DL (ref 0.5–1.4)
EST. GFR  (NO RACE VARIABLE): >60 ML/MIN/1.73 M^2
ESTIMATED AVG GLUCOSE: 120 MG/DL (ref 68–131)
GLUCOSE SERPL-MCNC: 90 MG/DL (ref 70–110)
HBA1C MFR BLD: 5.8 % (ref 4–5.6)
HDLC SERPL-MCNC: 42 MG/DL (ref 40–75)
HDLC SERPL: 21.9 % (ref 20–50)
LDLC SERPL CALC-MCNC: 107.4 MG/DL (ref 63–159)
NONHDLC SERPL-MCNC: 150 MG/DL
POTASSIUM SERPL-SCNC: 4.2 MMOL/L (ref 3.5–5.1)
PROT SERPL-MCNC: 7.8 G/DL (ref 6–8.4)
SODIUM SERPL-SCNC: 143 MMOL/L (ref 136–145)
TRIGL SERPL-MCNC: 213 MG/DL (ref 30–150)

## 2024-03-08 PROCEDURE — 80061 LIPID PANEL: CPT | Performed by: NURSE PRACTITIONER

## 2024-03-08 PROCEDURE — 83036 HEMOGLOBIN GLYCOSYLATED A1C: CPT | Performed by: NURSE PRACTITIONER

## 2024-03-08 PROCEDURE — 80053 COMPREHEN METABOLIC PANEL: CPT | Performed by: NURSE PRACTITIONER

## 2024-03-08 PROCEDURE — 36415 COLL VENOUS BLD VENIPUNCTURE: CPT | Performed by: NURSE PRACTITIONER

## 2024-03-08 NOTE — TELEPHONE ENCOUNTER
LOV 9-27-23  Last visit w/ Dr. Arce 8-15-22   2-29-24  Upcoming appt on 5-17*24    Rx request from pt's pharmacy  Thanks

## 2024-03-08 NOTE — TELEPHONE ENCOUNTER
No care due was identified.  Health [facility] Embedded Care Due Messages. Reference number: [id number].   3/08/2024 11:46:44 AM CST   Will address in 's chart.

## 2024-03-11 ENCOUNTER — OFFICE VISIT (OUTPATIENT)
Dept: INTERNAL MEDICINE | Facility: CLINIC | Age: 34
End: 2024-03-11
Payer: MEDICAID

## 2024-03-11 VITALS
TEMPERATURE: 98 F | DIASTOLIC BLOOD PRESSURE: 72 MMHG | SYSTOLIC BLOOD PRESSURE: 116 MMHG | HEART RATE: 58 BPM | RESPIRATION RATE: 16 BRPM | WEIGHT: 315 LBS | OXYGEN SATURATION: 98 % | BODY MASS INDEX: 38.36 KG/M2 | HEIGHT: 76 IN

## 2024-03-11 DIAGNOSIS — E11.69 TYPE 2 DIABETES MELLITUS WITH OBESITY: Primary | ICD-10-CM

## 2024-03-11 DIAGNOSIS — E78.2 MIXED HYPERLIPIDEMIA: ICD-10-CM

## 2024-03-11 DIAGNOSIS — K76.0 NAFLD (NONALCOHOLIC FATTY LIVER DISEASE): ICD-10-CM

## 2024-03-11 DIAGNOSIS — E66.9 TYPE 2 DIABETES MELLITUS WITH OBESITY: Primary | ICD-10-CM

## 2024-03-11 PROCEDURE — 3074F SYST BP LT 130 MM HG: CPT | Mod: CPTII,,, | Performed by: NURSE PRACTITIONER

## 2024-03-11 PROCEDURE — 99214 OFFICE O/P EST MOD 30 MIN: CPT | Mod: PBBFAC,PO | Performed by: NURSE PRACTITIONER

## 2024-03-11 PROCEDURE — 4010F ACE/ARB THERAPY RXD/TAKEN: CPT | Mod: CPTII,,, | Performed by: NURSE PRACTITIONER

## 2024-03-11 PROCEDURE — 3044F HG A1C LEVEL LT 7.0%: CPT | Mod: CPTII,,, | Performed by: NURSE PRACTITIONER

## 2024-03-11 PROCEDURE — 1159F MED LIST DOCD IN RCRD: CPT | Mod: CPTII,,, | Performed by: NURSE PRACTITIONER

## 2024-03-11 PROCEDURE — 99214 OFFICE O/P EST MOD 30 MIN: CPT | Mod: S$PBB,,, | Performed by: NURSE PRACTITIONER

## 2024-03-11 PROCEDURE — 3066F NEPHROPATHY DOC TX: CPT | Mod: CPTII,,, | Performed by: NURSE PRACTITIONER

## 2024-03-11 PROCEDURE — 3008F BODY MASS INDEX DOCD: CPT | Mod: CPTII,,, | Performed by: NURSE PRACTITIONER

## 2024-03-11 PROCEDURE — 3061F NEG MICROALBUMINURIA REV: CPT | Mod: CPTII,,, | Performed by: NURSE PRACTITIONER

## 2024-03-11 PROCEDURE — 99999 PR PBB SHADOW E&M-EST. PATIENT-LVL IV: CPT | Mod: PBBFAC,,, | Performed by: NURSE PRACTITIONER

## 2024-03-11 PROCEDURE — 3078F DIAST BP <80 MM HG: CPT | Mod: CPTII,,, | Performed by: NURSE PRACTITIONER

## 2024-03-11 RX ORDER — SEMAGLUTIDE 1.34 MG/ML
1 INJECTION, SOLUTION SUBCUTANEOUS WEEKLY
Qty: 1 EACH | Refills: 6 | Status: SHIPPED | OUTPATIENT
Start: 2024-03-11

## 2024-03-11 RX ORDER — PANTOPRAZOLE SODIUM 40 MG/1
40 TABLET, DELAYED RELEASE ORAL DAILY
Qty: 90 TABLET | Refills: 0 | Status: SHIPPED | OUTPATIENT
Start: 2024-03-11 | End: 2024-05-23 | Stop reason: SDUPTHER

## 2024-03-11 NOTE — PATIENT INSTRUCTIONS
Increase ozempic from 0.5 to 1mg every week.   Consider switching to medication - Mounjaro - this would also be once per week.

## 2024-03-11 NOTE — PROGRESS NOTES
"33 y.o. male here for 6 month follow up -   Last seen September 2023 - those notes below.   Mom present in the room with him - he has developmental disorder -     A1c is @ 5.8% -   Continues on ozempic 0.5 mg every week -   No intolerance issues. No GI upset nor constipation.   Current weight is at 321 lbs   Not much exercise, denies any changes in food/eating habits.   Denies low blood sugars on cbg/fingerstick.   Cholesterol levels stable, slightly high triglycerides still level of 213 - not always following low fat diet.         Last visit notes from September 2023 -   33 y.o. male here for routine follow up visit for management of t2dm -   Last seen June 2023 - those notes are below.     Hgba1c is @ 5.8% -   He was 326 lbs last visit -   Today is at 319 lbs.   Is continuing on ozempic 0.5mg SC every week.   Eating around 2 meals per day,  mabye 1 snack.   Mom with him at the visit - he is mostly nonverbal, doesn't make much eye contact.   She checks glucose 1 time per week - ranging 78 - 90's fasting   He feels well overall without acute complaints.   No abdominal pain, no nausea-   No constipation.       Last visit notes from June 2023 -   32y o male returns for follow-up on T2 DM   Last seen may 17th - 2023 - those notes below.     A1c down from 6.7% to 6.1% -   Kidney function stable, fasting glucose is at level of 76 on labs.   Side effects/GI with 1 week on metformin in past.   with new start to Ozempic    States he is doing well and mom reports no side effects from Ozempic 0.5mg SQ QW  Mom gives him the injection once per week.   Reports normal BMs, eating less, lost 2 pounds  Denies abdominal issues or pain, no N/V.   Diet: same as previous visit, but now smaller portions  Drinks: Body Cornucopia lite, diet drinks, flavored water, grape juice on occasion  Awake all night often, sleeps during the day.   He is not checking sugars - mom has a glucometer - but states they are on "different schedules" so she " "doesn't check.     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   Cholesterol - improved from last visit - newly on statin.   Lft's are elevated from history of fatty liver in the past, still around the same trend on review today.   Nonverbal often - but does communicate and make eye contact during the visit -       Last visit notes from May 17th, 2023 -   HPI: Seun Wynne is a 33 y.o.  male c/I for visit to address Diabetes Type 2  This is the first time I am seeing  him for care, follows with Dr. Arce, Karla ALVAREZ MD for primary care needs.   Has not seen endocrinology or diabetes specialist in the past.     was diagnosed with T2DM this past year.   Mom also has diabetes T2dm and I see her as well.   Has never been hospitalized r/t DM.  He did try metformin for about 1 week and had severe stomach upset and diarrhea, so stopped taking it.   Is not checking blood sugars.   Admits not following ADA diet -   Likes to snack.   Has been overweight his entire life, even as a young child.     Complications from diabetes and pertinent co-morbidities include:   Negative for DKA.   Has never taken insulin in life.   -negative for diabetic neuropathy.   -negative for nephropathy.   No microalbuminuria.   Eyes:  negative for Diabetic retinopathy   CV: Denies history of MI nor stroke.   CAD: Denies.  Takes aspirin 81mg tablet daily  BP: has history of HTN  Statin: Taking  ACE/ARB: Taking    Social History     Tobacco Use   Smoking Status Never   Smokeless Tobacco Never        Past medical History:   Past Medical History:   Diagnosis Date    Asperger syndrome     Diabetes mellitus, type 2     "under control after weight loss"    Hyperlipidemia     Hypertension     "BEEN FINE IN RECENT YRS NEVER TOOK MEDS"    Obesity       Family hx:   Family History   Problem Relation Age of Onset    Hypertension Mother     Cataracts Mother     Hypertension Father     Cancer Maternal Aunt         gallbladder    Diabetes Maternal Grandfather "     Hypertension Maternal Grandfather     Hypertension Paternal Grandfather     Cirrhosis Paternal Grandmother     Heart disease Neg Hx     Glaucoma Neg Hx     Macular degeneration Neg Hx       Current meds:   Current Outpatient Medications:     cholecalciferol, vitamin D3, (VITAMIN D3) 50 mcg (2,000 unit) Cap, Take 1 capsule by mouth once daily., Disp: , Rfl:     EScitalopram oxalate (LEXAPRO) 20 MG tablet, Take 1 tablet (20 mg total) by mouth once daily., Disp: 90 tablet, Rfl: 0    losartan (COZAAR) 25 MG tablet, Take 1 tablet by mouth once daily, Disp: 90 tablet, Rfl: 0    multivitamin capsule, Take 1 capsule by mouth once daily., Disp: , Rfl:     ondansetron (ZOFRAN-ODT) 8 MG TbDL, Take 1 tablet (8 mg total) by mouth every 12 (twelve) hours as needed (nausea or vomiting)., Disp: 30 tablet, Rfl: 0    pantoprazole (PROTONIX) 40 MG tablet, Take 1 tablet (40 mg total) by mouth once daily., Disp: 90 tablet, Rfl: 0    propranoloL (INDERAL LA) 60 MG 24 hr capsule, Take 1 capsule (60 mg total) by mouth once daily., Disp: 90 capsule, Rfl: 3    rosuvastatin (CRESTOR) 40 MG Tab, Take 1 tablet by mouth once daily, Disp: 90 tablet, Rfl: 0    semaglutide (OZEMPIC) 1 mg/dose (4 mg/3 mL), Inject 1 mg into the skin once a week., Disp: 1 each, Rfl: 6    topiramate (TOPAMAX) 100 MG tablet, Take 1 tablet (100 mg total) by mouth every evening., Disp: 30 tablet, Rfl: 11     Current Diabetes medications:   Ozempic 0.5mg Qw- tolerating well    Medications Tried and Failed:   Metformin - GI upset so stopped taking. (After 1 week).     Social:   Lives at home with: mother.   Life changes/stressors currently: none. He does sleep day time and up at night.   Diet: following ADA diet   Meals: 3 per day and snacks.        Breakfast - leftover from dinner - meats/potatoes/rice/bread       Lunch - sandwich       Dinner - meats/home cooked       Snacks - chips/cookies.         Drinks - sugar free body armour, sugar free sodas.  Exercise: none.  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     Glucose Monitoring:   Not checking blood sugar  regularly, denies lows    Standards of care:   Eyes: .: 11/04/2021  Foot exam: : 05/17/2023   Diabetes education: None.    Vital Signs  /72 (BP Location: Right arm, Patient Position: Sitting, BP Method: Large (Manual))   Pulse (!) 58   Temp 97.6 °F (36.4 °C) (Temporal)   Resp 16   Ht 6' 4" (1.93 m)   Wt (!) 146 kg (321 lb 14 oz)   SpO2 98%   BMI 39.18 kg/m²     Pertinent Labs:   Hgba1c   Lab Results   Component Value Date    HGBA1C 5.8 (H) 03/08/2024    HGBA1C 5.8 (H) 09/25/2023    HGBA1C 6.1 (H) 06/22/2023     Lipid panel   Lab Results   Component Value Date    CHOL 192 03/08/2024    CHOL 181 06/22/2023    CHOL 211 (H) 11/18/2022     Lab Results   Component Value Date    HDL 42 03/08/2024    HDL 47 06/22/2023    HDL 46 11/18/2022     Lab Results   Component Value Date    LDLCALC 107.4 03/08/2024    LDLCALC 107.6 06/22/2023    LDLCALC 117.0 11/18/2022     Lab Results   Component Value Date    TRIG 213 (H) 03/08/2024    TRIG 132 06/22/2023    TRIG 240 (H) 11/18/2022     Lab Results   Component Value Date    CHOLHDL 21.9 03/08/2024    CHOLHDL 26.0 06/22/2023    CHOLHDL 21.8 11/18/2022      CMP  Glucose   Date Value Ref Range Status   03/08/2024 90 70 - 110 mg/dL Final     BUN   Date Value Ref Range Status   03/08/2024 11 6 - 20 mg/dL Final     Creatinine   Date Value Ref Range Status   03/08/2024 1.0 0.5 - 1.4 mg/dL Final     eGFR if    Date Value Ref Range Status   12/23/2021 >60 >60 mL/min/1.73 m^2 Final     eGFR if non    Date Value Ref Range Status   12/23/2021 >60 >60 mL/min/1.73 m^2 Final     Comment:     Calculation used to obtain the estimated glomerular filtration  rate (eGFR) is the CKD-EPI equation.        AST   Date Value Ref Range Status   03/08/2024 33 10 - 40 U/L Final     ALT   Date Value Ref Range Status   03/08/2024 50 (H) 10 - 44 U/L " Final     Microalbumin creatinine ratio:   Lab Results   Component Value Date    MICALBCREAT Unable to calculate 03/08/2024       Review Of Systems:   Gen: Appetite good, no weight loss or gain, denies fatigue and weakness. + polydipsia - but mom says he drinks a lot of water most of the time.   Skin: Skin is intact and heals well, denies any rashes or hair changes.   EENT: Denies any acute visual disturbances, nor blurred vision.    Resp: Denies SOB or Dyspnea on exertion, denies cough.   Cardiac: Denies chest pain, palpitations, or swelling.   GI: Denies abdominal pain, nausea or vomiting, diarrhea, or constipation.   /GYN: Denies nocturia, nor burning, frequency or pain on urination.  MS/Neuro: Denies numbness/ tingling in BLE; Gait steady, speech clear  Psych: Denies drug/ETOH abuse, + developmental disorder - no changes.   Other systems: negative.    Physical Exam:   GENERAL: Well developed, well nourished in appearance.   PSYCH: AAOx3, Flat mood and affect, pleasant expression, non-conversant, appears relaxed, well groomed.   EYES: PERRL, Conjunctiva and corneas clear  NECK: Soft and Supple, trachea midline,   CHEST: Even, regular, and unlabored respirations  ABDOMEN: Soft, non-tender, non-distended.   VASCULAR:  no edema.  NEURO:  cranial nerves II - XII intact   MUSCULOSKELETAL: Good ROM, steady gait.   SKIN: Skin warm, dry, and intact     Assessment and Plan of Care:     Seun was seen today for follow-up and diabetes.    Diagnoses and all orders for this visit:    Type 2 diabetes mellitus with obesity  -     semaglutide (OZEMPIC) 1 mg/dose (4 mg/3 mL); Inject 1 mg into the skin once a week.    NAFLD (nonalcoholic fatty liver disease)    Mixed hyperlipidemia    BMI 38.0-38.9,adult           1. T2DM with hyperglycemia- Hgba1c goal is 7.5% or less without hypoglycemia - 6.1%--> 6.3%--> 6.7% --6.1%-- 5.8%   discussed DM, progression of disease, long term complications, CV risk factors and tx options.    Advise compliance with ADA diet and encourage exercise  Intolerant to metformin. Increase dose from 0.5 to 1 mg every week.   We discussed option of Mounjaro weekly - not sure on coverage/on his plan - but not preferred (currently has medicaid Healthy [employer]).   No known history of pancreatitis or medullary thyroid cancer.   If you experience severe abdominal pain, nausea, or diarrhea - please call me or notify my office immediately.    Elevated LFT's fatty liver in past - monitor.   Reviewed  hypoglycemia, s/s and appropriate tx. Have/get quick acting glucose tablets at hand.   Can check with mom's meter before a meal - spot check - normal bg's told 80 - 100's.   They don't want an extra meter or check regularly at this time.     2. HTN- controlled, continue meds as previously prescribed and monitor.   No urine mac - none checked recent.     3. Lipids- LDL goal < 100. Not at goal - but overall improved.   Currently on statin therapy- recheck lipid was great!   Elevated LFT's noted - patient denies any acute abdominal issues - will monitor.     4. Weight - BMI Body mass index is 39.18 kg/m².   Encourage Ada diet and exercise.   Continue the glp - likely increase next visit.     5. Renal Function - stable, no nephropathy.     6. Fatty liver - following with hepatology        Continue Ozempic  - increase to 1mg every week.   Consider Mounjaro - advised to message me via [facility] if would like to try and can try for approval on plan.   Follow up in 6 month follow up And labs.   Eyes exam - follow up dr. Dickson.

## 2024-03-20 ENCOUNTER — HOSPITAL ENCOUNTER (OUTPATIENT)
Dept: RADIOLOGY | Facility: HOSPITAL | Age: 34
Discharge: HOME | End: 2024-03-20
Attending: NURSE PRACTITIONER
Payer: MEDICAID

## 2024-03-20 DIAGNOSIS — R74.8 ELEVATED LIVER ENZYMES: ICD-10-CM

## 2024-03-20 DIAGNOSIS — K76.0 NAFLD (NONALCOHOLIC FATTY LIVER DISEASE): ICD-10-CM

## 2024-03-20 PROCEDURE — 76705 ECHO EXAM OF ABDOMEN: CPT | Mod: 26,,, | Performed by: RADIOLOGY

## 2024-03-20 PROCEDURE — 76705 ECHO EXAM OF ABDOMEN: CPT | Mod: TC

## 2024-03-27 ENCOUNTER — PROCEDURE VISIT (OUTPATIENT)
Dept: HEPATOLOGY | Facility: CLINIC | Age: 34
End: 2024-03-27
Payer: MEDICAID

## 2024-03-27 ENCOUNTER — OFFICE VISIT (OUTPATIENT)
Dept: HEPATOLOGY | Facility: CLINIC | Age: 34
End: 2024-03-27
Payer: MEDICAID

## 2024-03-27 VITALS — BODY MASS INDEX: 38.36 KG/M2 | HEIGHT: 76 IN | WEIGHT: 315 LBS

## 2024-03-27 DIAGNOSIS — E66.9 TYPE 2 DIABETES MELLITUS WITH OBESITY: ICD-10-CM

## 2024-03-27 DIAGNOSIS — R74.8 ELEVATED LIVER ENZYMES: ICD-10-CM

## 2024-03-27 DIAGNOSIS — E11.69 TYPE 2 DIABETES MELLITUS WITH OBESITY: ICD-10-CM

## 2024-03-27 DIAGNOSIS — E78.2 MIXED HYPERLIPIDEMIA: ICD-10-CM

## 2024-03-27 DIAGNOSIS — E66.01 CLASS 3 SEVERE OBESITY WITH SERIOUS COMORBIDITY AND BODY MASS INDEX (BMI) OF 40.0 TO 44.9 IN ADULT, UNSPECIFIED OBESITY TYPE: ICD-10-CM

## 2024-03-27 DIAGNOSIS — K76.0 FATTY LIVER: Primary | ICD-10-CM

## 2024-03-27 DIAGNOSIS — K76.0 NAFLD (NONALCOHOLIC FATTY LIVER DISEASE): ICD-10-CM

## 2024-03-27 PROCEDURE — 4010F ACE/ARB THERAPY RXD/TAKEN: CPT | Mod: CPTII,,, | Performed by: NURSE PRACTITIONER

## 2024-03-27 PROCEDURE — 3061F NEG MICROALBUMINURIA REV: CPT | Mod: CPTII,,, | Performed by: NURSE PRACTITIONER

## 2024-03-27 PROCEDURE — 3066F NEPHROPATHY DOC TX: CPT | Mod: CPTII,,, | Performed by: NURSE PRACTITIONER

## 2024-03-27 PROCEDURE — 99214 OFFICE O/P EST MOD 30 MIN: CPT | Mod: S$PBB,,, | Performed by: NURSE PRACTITIONER

## 2024-03-27 PROCEDURE — 91200 LIVER ELASTOGRAPHY: CPT | Mod: PBBFAC | Performed by: NURSE PRACTITIONER

## 2024-03-27 PROCEDURE — 91200 LIVER ELASTOGRAPHY: CPT | Mod: 26,S$PBB,, | Performed by: NURSE PRACTITIONER

## 2024-03-27 PROCEDURE — 3044F HG A1C LEVEL LT 7.0%: CPT | Mod: CPTII,,, | Performed by: NURSE PRACTITIONER

## 2024-03-27 PROCEDURE — 3008F BODY MASS INDEX DOCD: CPT | Mod: CPTII,,, | Performed by: NURSE PRACTITIONER

## 2024-03-27 PROCEDURE — 1159F MED LIST DOCD IN RCRD: CPT | Mod: CPTII,,, | Performed by: NURSE PRACTITIONER

## 2024-03-27 PROCEDURE — 99999 PR PBB SHADOW E&M-EST. PATIENT-LVL III: CPT | Mod: PBBFAC,,, | Performed by: NURSE PRACTITIONER

## 2024-03-27 PROCEDURE — 99213 OFFICE O/P EST LOW 20 MIN: CPT | Mod: PBBFAC,25 | Performed by: NURSE PRACTITIONER

## 2024-03-27 RX ORDER — SERTRALINE HYDROCHLORIDE 100 MG/1
100 TABLET, FILM COATED ORAL
COMMUNITY
Start: 2024-03-06

## 2024-03-27 NOTE — PROGRESS NOTES
[facility] - Established Patient    Last Clinic Visit: 9/29/23    Chief Complaint: Follow-up for fatty liver        HISTORY     This is a 33 y.o. male with PMH noted below, here for follow-up of fatty liver.    Fatty liver first noted on abd US 9/6/22. Liver enlarged at 23 cm. No findings to suggest advanced liver fibrosis.     His transaminases have been intermittently elevated since 2019, ALT>AST, <100.     Serologic workup has been negative for Albino's, alpha-1 antitrypsin deficiency, hemochromatosis, autoimmune etiology, and viral hepatitis. Ferritin >500 though iron sat WNL.     Fibrosis staging:   Fibroscan 9/2022: F2 (kPa 7.7), S3  Fibroscan 3/2024: F0-F1, S3     Interval history:  Here today with Mom.  Feels well overall, no concerns from liver standpoint.    Denies symptoms of hepatic decompensation including jaundice, ascites, cognitive problems to suggest hepatic encephalopathy, or GI bleeding.     Updates on risk factors for fatty liver:  Weight -- Body mass index is 40.12 kg/m².    Weight is up ~10 lb from last visit  Dyslipidemia -- well controlled though TG mildly elevated  On statin                                Insulin resistance / diabetes -- HgbA1c down to 5.8  On ozempic, dose recently increased    Alcohol use -- none    Liver enzymes: improving, ALT 50     Health Maintenance:  -- Last imaging: abd US 3/2024 without focal hepatic lesion  -- Hepatitis A & B vaccination: needs vaccines       Past medical history, surgical history, problem list, family history, social history, allergies: Reviewed and updated in the appropriate section of the electronic medical record.      Current Outpatient Medications   Medication Sig Dispense Refill    cholecalciferol, vitamin D3, (VITAMIN D3) 50 mcg (2,000 unit) Cap Take 1 capsule by mouth once daily.      EScitalopram oxalate (LEXAPRO) 20 MG tablet Take 1 tablet (20 mg total) by mouth once daily. 90 tablet 0    losartan (COZAAR) 25 MG tablet  Take 1 tablet by mouth once daily 90 tablet 0    multivitamin capsule Take 1 capsule by mouth once daily.      ondansetron (ZOFRAN-ODT) 8 MG TbDL Take 1 tablet (8 mg total) by mouth every 12 (twelve) hours as needed (nausea or vomiting). 30 tablet 0    pantoprazole (PROTONIX) 40 MG tablet Take 1 tablet (40 mg total) by mouth once daily. 90 tablet 0    rosuvastatin (CRESTOR) 40 MG Tab Take 1 tablet by mouth once daily 90 tablet 0    semaglutide (OZEMPIC) 1 mg/dose (4 mg/3 mL) Inject 1 mg into the skin once a week. 1 each 6    sertraline (ZOLOFT) 100 MG tablet Take 100 mg by mouth.      topiramate (TOPAMAX) 100 MG tablet Take 1 tablet (100 mg total) by mouth every evening. 30 tablet 11    propranoloL (INDERAL LA) 60 MG 24 hr capsule Take 1 capsule (60 mg total) by mouth once daily. 90 capsule 3     No current facility-administered medications for this visit.     Medication list reviewed and updated.      Review of Systems - as per HPI  Constitutional: Negative for unexpected weight change.   Respiratory: Negative for shortness of breath.    Cardiovascular: Negative for leg swelling.  Gastrointestinal: Negative for abdominal distention or abdominal pain. Negative for melena or hematemesis.  Musculoskeletal: Negative for myalgias.    Skin: Negative for jaundice or itching.  Neurological: Negative for confusion or slowed mentation. Negative for tremors.   Hematological: Does not bruise/bleed easily.       Physical Exam   Constitutional: No distress. Alert and oriented.  Eyes: No scleral icterus.   Pulmonary/Chest: Respiratory effort normal. No respiratory distress.   Abdominal: No distension, no ascites appreciated.   Extremities: No edema.   Neurological: No tremor.   Skin: No jaundice.   Psychiatric: Normal mood and affect. Speech, behavior, and thought content normal.           LABS & DIAGNOSTIC STUDIES     I have personally reviewed pertinent laboratory findings:    Lab Results   Component Value Date    ALT 50 (H)  "03/08/2024    AST 33 03/08/2024    ALKPHOS 41 (L) 03/08/2024    BILITOT 0.4 03/08/2024    ALBUMIN 4.3 03/08/2024       Lab Results   Component Value Date    WBC 5.93 08/08/2022    HGB 11.2 (L) 08/08/2022    HCT 36.9 (L) 08/08/2022    MCV 72 (L) 08/08/2022     08/08/2022       Lab Results   Component Value Date     03/08/2024    K 4.2 03/08/2024    BUN 11 03/08/2024    CREATININE 1.0 03/08/2024    ESTGFRAFRICA >60 12/23/2021    EGFRNONAA >60 12/23/2021       Lab Results   Component Value Date    SMOOTHMUSCAB Negative 1:40 09/06/2022    IGGSERUM 1007 09/06/2022    ANASCREEN Negative <1:80 09/06/2022    FERRITIN 510 (H) 07/28/2021    FESATURATED 23 07/28/2021    CERULOPLSM 26.0 09/06/2022    HEPBSAG Negative 03/05/2020    HEPBIGM Negative 03/05/2020    HEPBCAB Non-reactive 09/06/2022    HEPCAB Negative 03/05/2020    HEPAIGM Negative 03/05/2020       No results found for: "AFP"    I have personally reviewed the following result reports:  Abdominal US - 3/2024      ASSESSMENT & PLAN     33 y.o. male with:    1. Fatty liver    2. Mixed hyperlipidemia    3. Type 2 diabetes mellitus with obesity    4. Class 3 severe obesity with serious comorbidity and body mass index (BMI) of 40.0 to 44.9 in adult, unspecified obesity type    5. Elevated liver enzymes        Fibroscan continues to show significant steatosis but today suggests no-minimal fibrosis (F0-F1). ALT currently trending down with improved blood sugar control.     Recommendations:  Repeat Fibroscan in 1 year  Recommend labs to monitor liver enzymes/LFTs every 6-12 months, which can include labs with PCP  US surveillance every 1-2 years, pending Fibroscan results  Recommend vaccination for hepatitis A/B if not immune  Encouraged ongoing weight loss efforts with dietary changes and exercise. Higher dose of GLP-1 should also help with this.   Low carbohydrate/sugar, high protein/fiber diet.  Maintain good control of metabolic risk factors, specifically " blood sugar and cholesterol      Orders Placed This Encounter   Procedures    FibroScan Transplant Hepatology(Vibration Controlled Transient Elastography)       Return to clinic in 1 year with Fibroscan.       Thank you for allowing me to participate in the care of Seun Wynne    Sara Bunch, ALONSOP-C  Hepatology        Duration of encounter: 30 min  This includes face to face time and non-face to face time preparing to see the patient (eg, review of tests), obtaining and/or reviewing separately obtained history, documenting clinical information in the electronic or other health record, independently interpreting results and communicating results to the patient/family/caregiver, or care coordination.

## 2024-03-27 NOTE — PATIENT INSTRUCTIONS
Repeat Fibroscan in 1 year  Ongoing weight loss efforts and good control of blood sugar  Routine labs with primary care -- liver enzymes are improving!

## 2024-03-27 NOTE — PROCEDURES
FibroScan [place] (Vibration Controlled Transient Elastography)    Date/Time: 3/27/2024 1:00 PM    Performed by: Sara Bunch NP  Authorized by: Sara Bunch NP    Diagnosis:  NAFLD    Probe:  XL    Universal Protocol: Patient's identity, procedure and site were verified, confirmatory pause was performed.  Discussed procedure including risks and potential complications.  Questions answered.  Patient verbalizes understanding and wishes to proceed with VCTE.     Procedure: After providing explanations of the procedure, patient was placed in the supine position with right arm in maximum abduction to allow optimal exposure of right lateral abdomen.  Patient was briefly assessed, Testing was performed in the mid-axillary location, 50Hz Shear Wave pulses were applied and the resulting Shear Wave and Propagation Speed detected with a 3.5 MHz ultrasonic signal, using the FibroScan probe, Skin to liver capsule distance and liver parenchyma were accessed during the entire examination with the FibroScan probe, Patient was instructed to breathe normally and to abstain from sudden movements during the procedure, allowing for random measurements of liver stiffness. At least 10 Shear Waves were produced, Individual measurements of each Shear Wave were calculated.  Patient tolerated the procedure well with no complications.  Meets discharge criteria as was dismissed.  Rates pain 0 out of 10.  Patient will follow up with ordering provider to review results.    Findings  Median liver stiffness score:  5.1  CAP Reading: dB/m:  351    IQR/med %:  14  Interpretation  Fibrosis interpretation is based on medial liver stiffness - Kilopascal (kPa).    Fibrosis Stage:  F 0-1  Steatosis interpretation is based on controlled attenuation parameter - (dB/m).    Steatosis Grade:  S3

## 2024-04-03 DIAGNOSIS — G43.709 CHRONIC MIGRAINE WITHOUT AURA WITHOUT STATUS MIGRAINOSUS, NOT INTRACTABLE: ICD-10-CM

## 2024-04-03 NOTE — TELEPHONE ENCOUNTER
No care due was identified.  Health [facility] Embedded Care Due Messages. Reference number: [id number].   4/03/2024 4:20:08 PM CDT

## 2024-04-03 NOTE — TELEPHONE ENCOUNTER
Lov with you 8/15/22 & now booked to see you next on 5/17/24    Saw petty 3/8/23    Saw mini a few times in 2023., last 9/27/23    You ok with one refill to get her to 5/24 appt ??

## 2024-04-03 NOTE — TELEPHONE ENCOUNTER
----- Message from Josephine Hdz sent at 4/3/2024  4:05 PM CDT -----  Type:  RX Refill Request    Who Called: Pt mother  Refill or New Rx:refill  RX Name and Strength:propranoloL (INDERAL LA) 60 MG 24 hr capsule 90 capsule  Sig - Route: Take 1 capsule (60 mg total) by mouth once daily. - Oral    Preferred Pharmacy with phone number:  [id number] - [place] - [address] [place] [address]  Phone: [phone] Fax: [phone]    Would the patient rather a call back or a response via [facility]? [facility]  Best Call Back Number:[phone]  Additional Information: Last ordered by Stephanie Pan PA-C.

## 2024-04-05 PROBLEM — E66.01 CLASS 3 SEVERE OBESITY WITH SERIOUS COMORBIDITY AND BODY MASS INDEX (BMI) OF 40.0 TO 44.9 IN ADULT: Status: ACTIVE | Noted: 2022-09-20

## 2024-04-05 PROBLEM — E66.813 CLASS 3 SEVERE OBESITY WITH SERIOUS COMORBIDITY AND BODY MASS INDEX (BMI) OF 40.0 TO 44.9 IN ADULT: Status: ACTIVE | Noted: 2022-09-20

## 2024-04-06 RX ORDER — PROPRANOLOL HYDROCHLORIDE 60 MG/1
60 CAPSULE, EXTENDED RELEASE ORAL DAILY
Qty: 90 CAPSULE | Refills: 0 | Status: SHIPPED | OUTPATIENT
Start: 2024-04-06 | End: 2024-05-23 | Stop reason: SDUPTHER

## 2024-04-16 ENCOUNTER — TELEPHONE (OUTPATIENT)
Dept: INTERNAL MEDICINE | Facility: CLINIC | Age: 34
End: 2024-04-16
Payer: MEDICAID

## 2024-04-16 DIAGNOSIS — F84.0 AUTISM SPECTRUM DISORDER: ICD-10-CM

## 2024-04-16 NOTE — TELEPHONE ENCOUNTER
No care due was identified.  Health [facility] Embedded Care Due Messages. Reference number: [id number].   4/16/2024 7:02:40 AM CDT

## 2024-04-16 NOTE — TELEPHONE ENCOUNTER
Refill Routing Note   Medication(s) are not appropriate for processing by [facility] for the following reason(s):        Patient not seen by provider within 15 months: fovs    OR action(s):  Defer      Medication Therapy Plan:         Appointments  past 12m or future 3m with PCP    Date Provider   Last Visit   8/15/2022 Karla Arce MD   Next Visit   5/17/2024 Karla Arce MD   ED visits in past 90 days: 0        Note composed:3:42 PM 04/16/2024

## 2024-04-18 DIAGNOSIS — I10 ESSENTIAL HYPERTENSION: ICD-10-CM

## 2024-04-18 RX ORDER — ESCITALOPRAM OXALATE 20 MG/1
20 TABLET ORAL DAILY
Qty: 90 TABLET | Refills: 0 | OUTPATIENT
Start: 2024-04-18

## 2024-04-18 NOTE — TELEPHONE ENCOUNTER
No care due was identified.  Health [facility] Embedded Care Due Messages. Reference number: [id number].   4/18/2024 9:22:48 AM CDT

## 2024-04-19 ENCOUNTER — PATIENT MESSAGE (OUTPATIENT)
Dept: ADMINISTRATIVE | Facility: HOSPITAL | Age: 34
End: 2024-04-19
Payer: MEDICAID

## 2024-04-19 RX ORDER — LOSARTAN POTASSIUM 25 MG/1
25 TABLET ORAL
Qty: 90 TABLET | Refills: 0 | Status: SHIPPED | OUTPATIENT
Start: 2024-04-19 | End: 2024-05-23 | Stop reason: SDUPTHER

## 2024-04-19 NOTE — TELEPHONE ENCOUNTER
Refill Routing Note   Medication(s) are not appropriate for processing by [facility] for the following reason(s):        Patient not seen by provider within 15 months    [facility] action(s):  Defer               Appointments  past 12m or future 3m with PCP    Date Provider   Last Visit   8/15/2022 Karla Arce MD   Next Visit   5/17/2024 Karla Arce MD   ED visits in past 90 days: 0        Note composed:12:04 PM 04/19/2024

## 2024-04-19 NOTE — TELEPHONE ENCOUNTER
Received refill request for Lexapro.    Zoloft is on his medication list.    Is he currently taking Lexapro OR Zoloft?

## 2024-05-13 ENCOUNTER — LAB VISIT (OUTPATIENT)
Dept: LAB | Facility: HOSPITAL | Age: 34
End: 2024-05-13
Attending: INTERNAL MEDICINE
Payer: MEDICAID

## 2024-05-13 DIAGNOSIS — Z00.00 ANNUAL PHYSICAL EXAM: ICD-10-CM

## 2024-05-13 LAB
ALBUMIN SERPL BCP-MCNC: 4.4 G/DL (ref 3.5–5.2)
ALBUMIN/CREAT UR: 8.6 UG/MG (ref 0–30)
ALP SERPL-CCNC: 39 U/L (ref 55–135)
ALT SERPL W/O P-5'-P-CCNC: 73 U/L (ref 10–44)
ANION GAP SERPL CALC-SCNC: 11 MMOL/L (ref 8–16)
AST SERPL-CCNC: 46 U/L (ref 10–40)
BASOPHILS # BLD AUTO: 0.03 K/UL (ref 0–0.2)
BASOPHILS NFR BLD: 0.6 % (ref 0–1.9)
BILIRUB SERPL-MCNC: 0.4 MG/DL (ref 0.1–1)
BILIRUB UR QL STRIP: NEGATIVE
BUN SERPL-MCNC: 11 MG/DL (ref 6–20)
CALCIUM SERPL-MCNC: 9.7 MG/DL (ref 8.7–10.5)
CHLORIDE SERPL-SCNC: 110 MMOL/L (ref 95–110)
CHOLEST SERPL-MCNC: 178 MG/DL (ref 120–199)
CHOLEST/HDLC SERPL: 4 {RATIO} (ref 2–5)
CLARITY UR: CLEAR
CO2 SERPL-SCNC: 21 MMOL/L (ref 23–29)
COLOR UR: YELLOW
CREAT SERPL-MCNC: 1 MG/DL (ref 0.5–1.4)
CREAT UR-MCNC: 175 MG/DL (ref 23–375)
DIFFERENTIAL METHOD BLD: ABNORMAL
EOSINOPHIL # BLD AUTO: 0.2 K/UL (ref 0–0.5)
EOSINOPHIL NFR BLD: 4.3 % (ref 0–8)
ERYTHROCYTE [DISTWIDTH] IN BLOOD BY AUTOMATED COUNT: 20.3 % (ref 11.5–14.5)
EST. GFR  (NO RACE VARIABLE): >60 ML/MIN/1.73 M^2
ESTIMATED AVG GLUCOSE: 123 MG/DL (ref 68–131)
GLUCOSE SERPL-MCNC: 80 MG/DL (ref 70–110)
GLUCOSE UR QL STRIP: NEGATIVE
HBA1C MFR BLD: 5.9 % (ref 4–5.6)
HCT VFR BLD AUTO: 35.8 % (ref 40–54)
HDLC SERPL-MCNC: 44 MG/DL (ref 40–75)
HDLC SERPL: 24.7 % (ref 20–50)
HGB BLD-MCNC: 11 G/DL (ref 14–18)
HGB UR QL STRIP: NEGATIVE
IMM GRANULOCYTES # BLD AUTO: 0.03 K/UL (ref 0–0.04)
IMM GRANULOCYTES NFR BLD AUTO: 0.6 % (ref 0–0.5)
KETONES UR QL STRIP: NEGATIVE
LDLC SERPL CALC-MCNC: 110.2 MG/DL (ref 63–159)
LEUKOCYTE ESTERASE UR QL STRIP: NEGATIVE
LYMPHOCYTES # BLD AUTO: 1.9 K/UL (ref 1–4.8)
LYMPHOCYTES NFR BLD: 37.6 % (ref 18–48)
MCH RBC QN AUTO: 22 PG (ref 27–31)
MCHC RBC AUTO-ENTMCNC: 30.7 G/DL (ref 32–36)
MCV RBC AUTO: 72 FL (ref 82–98)
MICROALBUMIN UR DL<=1MG/L-MCNC: 15 UG/ML
MONOCYTES # BLD AUTO: 0.4 K/UL (ref 0.3–1)
MONOCYTES NFR BLD: 7.5 % (ref 4–15)
NEUTROPHILS # BLD AUTO: 2.4 K/UL (ref 1.8–7.7)
NEUTROPHILS NFR BLD: 49.4 % (ref 38–73)
NITRITE UR QL STRIP: NEGATIVE
NONHDLC SERPL-MCNC: 134 MG/DL
NRBC BLD-RTO: 0 /100 WBC
PH UR STRIP: 6 [PH] (ref 5–8)
PLATELET # BLD AUTO: 243 K/UL (ref 150–450)
PMV BLD AUTO: 9.8 FL (ref 9.2–12.9)
POTASSIUM SERPL-SCNC: 3.9 MMOL/L (ref 3.5–5.1)
PROT SERPL-MCNC: 7.8 G/DL (ref 6–8.4)
PROT UR QL STRIP: NEGATIVE
RBC # BLD AUTO: 4.99 M/UL (ref 4.6–6.2)
SODIUM SERPL-SCNC: 142 MMOL/L (ref 136–145)
SP GR UR STRIP: 1.02 (ref 1–1.03)
TRIGL SERPL-MCNC: 119 MG/DL (ref 30–150)
TSH SERPL DL<=0.005 MIU/L-ACNC: 1.48 UIU/ML (ref 0.4–4)
URN SPEC COLLECT METH UR: NORMAL
UROBILINOGEN UR STRIP-ACNC: NEGATIVE EU/DL
WBC # BLD AUTO: 4.92 K/UL (ref 3.9–12.7)

## 2024-05-13 PROCEDURE — 81003 URINALYSIS AUTO W/O SCOPE: CPT | Performed by: INTERNAL MEDICINE

## 2024-05-13 PROCEDURE — 80053 COMPREHEN METABOLIC PANEL: CPT | Performed by: INTERNAL MEDICINE

## 2024-05-13 PROCEDURE — 84443 ASSAY THYROID STIM HORMONE: CPT | Performed by: INTERNAL MEDICINE

## 2024-05-13 PROCEDURE — 36415 COLL VENOUS BLD VENIPUNCTURE: CPT | Performed by: INTERNAL MEDICINE

## 2024-05-13 PROCEDURE — 85025 COMPLETE CBC W/AUTO DIFF WBC: CPT | Performed by: INTERNAL MEDICINE

## 2024-05-13 PROCEDURE — 83036 HEMOGLOBIN GLYCOSYLATED A1C: CPT | Performed by: INTERNAL MEDICINE

## 2024-05-13 PROCEDURE — 80061 LIPID PANEL: CPT | Performed by: INTERNAL MEDICINE

## 2024-05-13 PROCEDURE — 82043 UR ALBUMIN QUANTITATIVE: CPT | Performed by: INTERNAL MEDICINE

## 2024-05-17 ENCOUNTER — TELEPHONE (OUTPATIENT)
Dept: INTERNAL MEDICINE | Facility: CLINIC | Age: 34
End: 2024-05-17
Payer: MEDICAID

## 2024-05-17 NOTE — TELEPHONE ENCOUNTER
----- Message from Sury Navarro sent at 5/17/2024  4:40 PM CDT -----  Contact: [phone] Patient  No blue slot available to schedule an appointment for the patient.  Patient is established with which PCP: DR Arce  Reason for the visit: Annual  Would the patient like a call back, or a response through their [facility] portal?:  Call Back Please. Thank you

## 2024-05-22 ENCOUNTER — PATIENT OUTREACH (OUTPATIENT)
Dept: ADMINISTRATIVE | Facility: HOSPITAL | Age: 34
End: 2024-05-22
Payer: MEDICAID

## 2024-05-22 NOTE — PROGRESS NOTES
Population Health Chart Review & Patient Outreach Details      Additional [facility] Health Notes:               Updates Requested / Reviewed:      Care Everywhere, , and Immunizations Reconciliation Completed or Queried: [place]         Health Maintenance Topics Overdue:      [facility] Score: 2     Eye Exam  Foot Exam                       Health Maintenance Topic(s) Outreach Outcomes & Actions Taken:    Primary Care Appt - Outreach Outcomes & Actions Taken  : Primary Care Appt Scheduled

## 2024-05-23 ENCOUNTER — OFFICE VISIT (OUTPATIENT)
Dept: INTERNAL MEDICINE | Facility: CLINIC | Age: 34
End: 2024-05-23
Payer: MEDICAID

## 2024-05-23 VITALS
HEART RATE: 98 BPM | OXYGEN SATURATION: 99 % | TEMPERATURE: 99 F | SYSTOLIC BLOOD PRESSURE: 114 MMHG | RESPIRATION RATE: 18 BRPM | WEIGHT: 315 LBS | HEIGHT: 76 IN | BODY MASS INDEX: 38.36 KG/M2 | DIASTOLIC BLOOD PRESSURE: 86 MMHG

## 2024-05-23 DIAGNOSIS — I15.2 HYPERTENSION ASSOCIATED WITH DIABETES: Primary | ICD-10-CM

## 2024-05-23 DIAGNOSIS — G43.709 CHRONIC MIGRAINE WITHOUT AURA WITHOUT STATUS MIGRAINOSUS, NOT INTRACTABLE: ICD-10-CM

## 2024-05-23 DIAGNOSIS — R12 HEARTBURN: ICD-10-CM

## 2024-05-23 DIAGNOSIS — Z00.00 ANNUAL PHYSICAL EXAM: ICD-10-CM

## 2024-05-23 DIAGNOSIS — E66.9 TYPE 2 DIABETES MELLITUS WITH OBESITY: ICD-10-CM

## 2024-05-23 DIAGNOSIS — E11.69 HYPERLIPIDEMIA ASSOCIATED WITH TYPE 2 DIABETES MELLITUS: ICD-10-CM

## 2024-05-23 DIAGNOSIS — E11.69 TYPE 2 DIABETES MELLITUS WITH OBESITY: ICD-10-CM

## 2024-05-23 DIAGNOSIS — E78.5 HYPERLIPIDEMIA ASSOCIATED WITH TYPE 2 DIABETES MELLITUS: ICD-10-CM

## 2024-05-23 DIAGNOSIS — F84.0 AUTISM SPECTRUM DISORDER: ICD-10-CM

## 2024-05-23 DIAGNOSIS — E11.59 HYPERTENSION ASSOCIATED WITH DIABETES: Primary | ICD-10-CM

## 2024-05-23 DIAGNOSIS — F40.10 SOCIAL ANXIETY DISORDER: ICD-10-CM

## 2024-05-23 DIAGNOSIS — E66.01 SEVERE OBESITY (BMI 35.0-39.9) WITH COMORBIDITY: ICD-10-CM

## 2024-05-23 PROCEDURE — 3066F NEPHROPATHY DOC TX: CPT | Mod: CPTII,,, | Performed by: INTERNAL MEDICINE

## 2024-05-23 PROCEDURE — 90677 PCV20 VACCINE IM: CPT | Mod: PBBFAC

## 2024-05-23 PROCEDURE — 3079F DIAST BP 80-89 MM HG: CPT | Mod: CPTII,,, | Performed by: INTERNAL MEDICINE

## 2024-05-23 PROCEDURE — 3008F BODY MASS INDEX DOCD: CPT | Mod: CPTII,,, | Performed by: INTERNAL MEDICINE

## 2024-05-23 PROCEDURE — 1160F RVW MEDS BY RX/DR IN RCRD: CPT | Mod: CPTII,,, | Performed by: INTERNAL MEDICINE

## 2024-05-23 PROCEDURE — 3044F HG A1C LEVEL LT 7.0%: CPT | Mod: CPTII,,, | Performed by: INTERNAL MEDICINE

## 2024-05-23 PROCEDURE — 3074F SYST BP LT 130 MM HG: CPT | Mod: CPTII,,, | Performed by: INTERNAL MEDICINE

## 2024-05-23 PROCEDURE — 3061F NEG MICROALBUMINURIA REV: CPT | Mod: CPTII,,, | Performed by: INTERNAL MEDICINE

## 2024-05-23 PROCEDURE — 90471 IMMUNIZATION ADMIN: CPT | Mod: PBBFAC

## 2024-05-23 PROCEDURE — 4010F ACE/ARB THERAPY RXD/TAKEN: CPT | Mod: CPTII,,, | Performed by: INTERNAL MEDICINE

## 2024-05-23 PROCEDURE — 99999PBSHW PR PBB SHADOW TECHNICAL ONLY FILED TO HB: Mod: PBBFAC,,,

## 2024-05-23 PROCEDURE — 99999 PR PBB SHADOW E&M-EST. PATIENT-LVL III: CPT | Mod: PBBFAC,,, | Performed by: INTERNAL MEDICINE

## 2024-05-23 PROCEDURE — 1159F MED LIST DOCD IN RCRD: CPT | Mod: CPTII,,, | Performed by: INTERNAL MEDICINE

## 2024-05-23 PROCEDURE — 99214 OFFICE O/P EST MOD 30 MIN: CPT | Mod: S$PBB,,, | Performed by: INTERNAL MEDICINE

## 2024-05-23 PROCEDURE — 99213 OFFICE O/P EST LOW 20 MIN: CPT | Mod: PBBFAC,25 | Performed by: INTERNAL MEDICINE

## 2024-05-23 RX ORDER — PROPRANOLOL HYDROCHLORIDE 60 MG/1
60 CAPSULE, EXTENDED RELEASE ORAL DAILY
Qty: 90 CAPSULE | Refills: 3 | Status: SHIPPED | OUTPATIENT
Start: 2024-05-23

## 2024-05-23 RX ORDER — ROSUVASTATIN CALCIUM 40 MG/1
TABLET, COATED ORAL
Qty: 90 TABLET | Refills: 3 | Status: SHIPPED | OUTPATIENT
Start: 2024-05-23

## 2024-05-23 RX ORDER — BUPROPION HYDROCHLORIDE 75 MG/1
75 TABLET ORAL EVERY MORNING
COMMUNITY
Start: 2024-05-08

## 2024-05-23 RX ORDER — LOSARTAN POTASSIUM 25 MG/1
25 TABLET ORAL DAILY
Qty: 90 TABLET | Refills: 3 | Status: SHIPPED | OUTPATIENT
Start: 2024-05-23

## 2024-05-23 RX ORDER — PANTOPRAZOLE SODIUM 40 MG/1
40 TABLET, DELAYED RELEASE ORAL DAILY
Qty: 90 TABLET | Refills: 3 | Status: SHIPPED | OUTPATIENT
Start: 2024-05-23

## 2024-05-23 RX ADMIN — PNEUMOCOCCAL 20-VALENT CONJUGATE VACCINE 0.5 ML
2.2; 2.2; 2.2; 2.2; 2.2; 2.2; 2.2; 2.2; 2.2; 2.2; 2.2; 2.2; 2.2; 2.2; 2.2; 2.2; 4.4; 2.2; 2.2; 2.2 INJECTION, SUSPENSION INTRAMUSCULAR at 11:05

## 2024-05-23 NOTE — PROGRESS NOTES
"    Subjective:     Seun Wynne is a 33 y.o. male who presents for an annual exam.  He is present with his mother Alis Wynne.    PCP: Karla Arce MD    Medical History:   Past Medical History:   Diagnosis Date    Asperger syndrome     Diabetes mellitus, type 2     "under control after weight loss"    Hyperlipidemia     Hypertension     "BEEN FINE IN RECENT YRS NEVER TOOK MEDS"    Obesity      Family History: family history includes Cancer in his maternal aunt; Cataracts in his mother; Cirrhosis in his paternal grandmother; Diabetes in his maternal grandfather; Hypertension in his father, maternal grandfather, mother, and paternal grandfather.    Surgical History:   Past Surgical History:   Procedure Laterality Date    ESOPHAGOGASTRODUODENOSCOPY N/A 6/19/2018    Procedure: ESOPHAGOGASTRODUODENOSCOPY (EGD);  Surgeon: Darell Gautam Jr., MD;  Location: [facility];  Service: Endoscopy;  Laterality: N/A;      Social History:  reports that he has never smoked. He has never used smokeless tobacco. He reports that he does not drink alcohol and does not use drugs.     Allergies: Review of patient's allergies indicates:  No Known Allergies    Medications:   Current Outpatient Medications:     buPROPion (WELLBUTRIN) 75 MG tablet, Take 75 mg by mouth every morning., Disp: , Rfl:     cholecalciferol, vitamin D3, (VITAMIN D3) 50 mcg (2,000 unit) Cap, Take 1 capsule by mouth once daily., Disp: , Rfl:     multivitamin capsule, Take 1 capsule by mouth once daily., Disp: , Rfl:     ondansetron (ZOFRAN-ODT) 8 MG TbDL, Take 1 tablet (8 mg total) by mouth every 12 (twelve) hours as needed (nausea or vomiting)., Disp: 30 tablet, Rfl: 0    semaglutide (OZEMPIC) 1 mg/dose (4 mg/3 mL), Inject 1 mg into the skin once a week., Disp: 1 each, Rfl: 6    sertraline (ZOLOFT) 100 MG tablet, Take 100 mg by mouth., Disp: , Rfl:     topiramate (TOPAMAX) 100 MG tablet, Take 1 tablet (100 mg total) by mouth every evening., Disp: 30 " tablet, Rfl: 11    losartan (COZAAR) 25 MG tablet, Take 1 tablet (25 mg total) by mouth once daily., Disp: 90 tablet, Rfl: 3    pantoprazole (PROTONIX) 40 MG tablet, Take 1 tablet (40 mg total) by mouth once daily., Disp: 90 tablet, Rfl: 3    propranoloL (INDERAL LA) 60 MG 24 hr capsule, Take 1 capsule (60 mg total) by mouth once daily., Disp: 90 capsule, Rfl: 3    rosuvastatin (CRESTOR) 40 MG Tab, Take 1 tablet by mouth once daily, Disp: 90 tablet, Rfl: 3    Health Maintenance:   Health Maintenance Topics with due status: Not Due       Topic Last Completion Date    TETANUS VACCINE 01/24/2022    Diabetes Urine Screening 05/13/2024    Lipid Panel 05/13/2024    Hemoglobin A1c 05/13/2024     Lab Results   Component Value Date    HEPCAB Negative 03/05/2020     Eye Exam:   Dental Exam:  Hepatitis C screening:    Vaccinations:   Immunization History   Administered Date(s) Administered    COVID-19 MRNA, LN-S PF (MODERNA HALF 0.25 ML DOSE) 12/03/2021    COVID-19, MRNA, LN-S, PF (MODERNA FULL 0.5 ML DOSE) 03/18/2021, 03/18/2021, 04/17/2021, 04/17/2021, 12/03/2021    COVID-19, mRNA, LNP-S, bivalent booster, PF (Moderna Omicron)12 + YEARS 07/26/2023, 07/26/2023    Influenza - Quadrivalent - PF *Preferred* (6 months and older) 11/11/2020, 12/22/2021, 09/27/2023    Pneumococcal Conjugate - 20 Valent 05/23/2024    Td (ADULT) 01/24/2022    Td - PF (ADULT) 01/24/2022, 01/24/2022    Tdap 10/28/2010       Tetanus: 2022  Covid vaccine: boosted      Body mass index is 38.7 kg/m².  Wt Readings from Last 3 Encounters:   05/23/24 (!) 144.2 kg (317 lb 14.5 oz)   03/27/24 (!) 149.5 kg (329 lb 9.4 oz)   03/11/24 (!) 146 kg (321 lb 14 oz)       Review of Systems   Unable to perform ROS: Other (Asperger syndrome)   Cardiovascular:  Negative for chest pain.   Gastrointestinal:  Negative for abdominal pain.          Objective:     Physical Exam  Vitals reviewed.   Constitutional:       General: He is awake. He is not in acute distress.      Appearance: Normal appearance. He is well-developed and well-groomed. He is not ill-appearing.   HENT:      Head: Normocephalic and atraumatic.      Right Ear: Tympanic membrane, ear canal and external ear normal. Tympanic membrane is not erythematous or bulging.      Left Ear: Tympanic membrane, ear canal and external ear normal. Tympanic membrane is not erythematous or bulging.      Nose: Nose normal. No congestion.      Mouth/Throat:      Mouth: Mucous membranes are moist.      Tongue: No lesions.      Pharynx: Oropharynx is clear. Uvula midline. No oropharyngeal exudate or posterior oropharyngeal erythema.   Eyes:      General: Lids are normal. Vision grossly intact. No scleral icterus.     Conjunctiva/sclera: Conjunctivae normal.      Right eye: Right conjunctiva is not injected.      Left eye: Left conjunctiva is not injected.      Pupils: Pupils are equal, round, and reactive to light.   Neck:      Thyroid: No thyroid mass or thyromegaly.   Cardiovascular:      Rate and Rhythm: Normal rate and regular rhythm.      Pulses: Normal pulses.      Heart sounds: Normal heart sounds. No murmur heard.  Pulmonary:      Effort: Pulmonary effort is normal. No respiratory distress.      Breath sounds: Normal breath sounds. No decreased breath sounds or wheezing.   Abdominal:      General: Bowel sounds are normal. There is no distension.      Palpations: Abdomen is soft.      Tenderness: There is no abdominal tenderness. There is no guarding or rebound.   Musculoskeletal:         General: Normal range of motion.      Cervical back: Neck supple.      Right lower leg: No edema.      Left lower leg: No edema.   Lymphadenopathy:      Cervical: No cervical adenopathy.      Upper Body:      Right upper body: No supraclavicular adenopathy.      Left upper body: No supraclavicular adenopathy.   Skin:     General: Skin is warm and dry.      Coloration: Skin is not cyanotic.      Findings: No lesion or rash.      Nails: There is no  clubbing.   Neurological:      Mental Status: Mental status is at baseline.      Coordination: Coordination is intact.      Gait: Gait is intact.      Deep Tendon Reflexes: Reflexes are normal and symmetric. Reflexes normal.   Psychiatric:         Mood and Affect: Affect is flat.         Speech: Speech is delayed.            Assessment:        1. Hypertension associated with diabetes    2. Type 2 diabetes mellitus with obesity    3. Hyperlipidemia associated with type 2 diabetes mellitus    4. Heartburn    5. Chronic migraine without aura without status migrainosus, not intractable    6. Autism spectrum disorder    7. Social anxiety disorder    8. Annual physical exam    9. Severe obesity (BMI 35.0-39.9) with comorbidity           Plan:     1. Hypertension associated with diabetes  - controlled, continue losartan eating  -  losartan (COZAAR) 25 MG tablet; Take 1 tablet (25 mg total) by mouth once daily.  Dispense: 90 tablet; Refill: 3    2. Type 2 diabetes mellitus with obesity  - hemoglobin A1c is at goal, continue Ozempic    3. Hyperlipidemia associated with type 2 diabetes mellitus  - reports compliance with statin, reduce fat intake if possible, continue Crestor  - rosuvastatin (CRESTOR) 40 MG Tab; Take 1 tablet by mouth once daily  Dispense: 90 tablet; Refill: 3    4. Heartburn  - pantoprazole (PROTONIX) 40 MG tablet; Take 1 tablet (40 mg total) by mouth once daily.  Dispense: 90 tablet; Refill: 3    5. Chronic migraine without aura without status migrainosus, not intractable  - propranoloL (INDERAL LA) 60 MG 24 hr capsule; Take 1 capsule (60 mg total) by mouth once daily.  Dispense: 90 capsule; Refill: 3    6. Autism spectrum disorder  - stable    7. Social anxiety disorder  - sees Psychiatry regularly, stable    8. Annual physical exam  - reviewed recent labs with patient's mom  -  (VFC) pneumococcocal 20 vaccine (PREVNAR 20) syringe (preferred for >/= 2 months)    9. Severe obesity (BMI 35.0-39.9) with  comorbidity  - started Ozempic, lost 10-15 lbs, his activity level and eating habits are the same, will monitor    RTC in 6 months for follow-up or sooner if needed    __________________________    Karla Arce MD, PharmD  [facility]  American Board of Obesity Medicine diplomate  Office [phone]

## 2024-05-31 ENCOUNTER — PATIENT OUTREACH (OUTPATIENT)
Dept: ADMINISTRATIVE | Facility: HOSPITAL | Age: 34
End: 2024-05-31
Payer: MEDICAID

## 2024-06-26 PROBLEM — I15.2 HYPERTENSION ASSOCIATED WITH DIABETES: Status: ACTIVE | Noted: 2024-06-26

## 2024-06-26 PROBLEM — E11.59 HYPERTENSION ASSOCIATED WITH DIABETES: Status: ACTIVE | Noted: 2024-06-26

## 2024-06-26 PROBLEM — R12 HEARTBURN: Status: ACTIVE | Noted: 2024-06-26

## 2024-08-16 ENCOUNTER — OFFICE VISIT (OUTPATIENT)
Dept: NEUROLOGY | Facility: CLINIC | Age: 34
End: 2024-08-16
Payer: MEDICAID

## 2024-08-16 VITALS
DIASTOLIC BLOOD PRESSURE: 84 MMHG | SYSTOLIC BLOOD PRESSURE: 131 MMHG | HEART RATE: 75 BPM | BODY MASS INDEX: 38.36 KG/M2 | HEIGHT: 76 IN | WEIGHT: 315 LBS

## 2024-08-16 DIAGNOSIS — G43.709 CHRONIC MIGRAINE WITHOUT AURA WITHOUT STATUS MIGRAINOSUS, NOT INTRACTABLE: Primary | ICD-10-CM

## 2024-08-16 DIAGNOSIS — F84.0 AUTISM SPECTRUM DISORDER: ICD-10-CM

## 2024-08-16 PROCEDURE — 99213 OFFICE O/P EST LOW 20 MIN: CPT | Mod: PBBFAC

## 2024-08-16 PROCEDURE — 99999 PR PBB SHADOW E&M-EST. PATIENT-LVL III: CPT | Mod: PBBFAC,,,

## 2024-08-16 RX ORDER — TOPIRAMATE 100 MG/1
100 TABLET, FILM COATED ORAL NIGHTLY
Qty: 30 TABLET | Refills: 11 | Status: SHIPPED | OUTPATIENT
Start: 2024-08-16 | End: 2025-08-16

## 2024-08-16 RX ORDER — TOPIRAMATE 25 MG/1
25 TABLET ORAL DAILY
Qty: 30 TABLET | Refills: 11 | Status: SHIPPED | OUTPATIENT
Start: 2024-08-16 | End: 2025-08-16

## 2024-08-16 NOTE — PROGRESS NOTES
New Patient     SUBJECTIVE:  Patient ID: Seun Wynne   MRN: [id number]  Referred By: Aaareferral Self  Chief Complaint: Headache      History of Present Illness:   33 y.o. male with chronic migraines, Asperger syndrome, social anxiety, HTN, DM2 (controlled w/ ozempic), HLD, alpha thalassemia, fatty liver disease, heartburn, who presents to clinic with mother for evaluation of headaches.     Patient participated in appointment and was able to give some descriptors of his headaches, however, mother was primary historian during appointment.     Patient is a previous patient of Dr. Hampton, transferring care to me for treatment of chronic migraines. Current regimen includes: topamax 100mg    Pt also prescribed the following meds by pcp: lexapro 20mg, losartan 25m, Propranolol 60mg, zoloft 100mg, wellbutrin 75mg    Patient mother reports giving him tylenol 2-4x/month with severe headache but mother reports patient may be taking it more often without her knowledge. Pt unable to state how often he is having headaches but feels like his medication could be working better.       PMHx negative for TBI, Meningitis, Aneurysms, Kidney Stones, asthma, GI bleed, osteoporosis, CAD/MI, CVA/TIA, cancer     Family Hx negative for Migraines     Headache History:  Onset - 30yo  Previous Hx of HA -   Location/Radiation - bilateral frontalis   Quality - pulsing, sometimes sharp   Duration - can last a whole day   Intensity (range) - moderate to severe pain   Frequency - unable to determine; however when questioned, feels like they could be better; 0/30 are debilitating  Triggers - none identified  Aggravating Factors - sounds  Alleviating Factors - laying down, rest, quiet  Recent Changes - no  Prodrome/Aura - no  HA today? - no  Time of day of most headaches- anytime  Sleep - no snoring, wakes feeling refreshed, resolution of headache with sleep    Associated symptoms with the headache:   Meningeal symptoms - photophobia, phonophobia,  "exercise intolerance   Nausea/vomitting  Nasal drainage   Visual blurriness   Pallor/flushing  Dizziness   Vertigo  Confusion  Impaired concentration   Pain worsened with physical activity   Neck pain         Treatments Tried   Tylenol   Topamax 100mg   Propranolol 60mg   Losartan   Wellbutrin   Zoloft   Zofran     Social History  Alcohol - denies  Smoke - denies  Recreational Drug Use- denies    Current Medications:    Current Outpatient Medications:     buPROPion (WELLBUTRIN) 75 MG tablet, Take 75 mg by mouth every morning., Disp: , Rfl:     cholecalciferol, vitamin D3, (VITAMIN D3) 50 mcg (2,000 unit) Cap, Take 1 capsule by mouth once daily., Disp: , Rfl:     losartan (COZAAR) 25 MG tablet, Take 1 tablet (25 mg total) by mouth once daily., Disp: 90 tablet, Rfl: 3    multivitamin capsule, Take 1 capsule by mouth once daily., Disp: , Rfl:     ondansetron (ZOFRAN-ODT) 8 MG TbDL, Take 1 tablet (8 mg total) by mouth every 12 (twelve) hours as needed (nausea or vomiting)., Disp: 30 tablet, Rfl: 0    pantoprazole (PROTONIX) 40 MG tablet, Take 1 tablet (40 mg total) by mouth once daily., Disp: 90 tablet, Rfl: 3    propranoloL (INDERAL LA) 60 MG 24 hr capsule, Take 1 capsule (60 mg total) by mouth once daily., Disp: 90 capsule, Rfl: 3    rosuvastatin (CRESTOR) 40 MG Tab, Take 1 tablet by mouth once daily, Disp: 90 tablet, Rfl: 3    semaglutide (OZEMPIC) 1 mg/dose (4 mg/3 mL), Inject 1 mg into the skin once a week., Disp: 1 each, Rfl: 6    sertraline (ZOLOFT) 100 MG tablet, Take 100 mg by mouth., Disp: , Rfl:     topiramate (TOPAMAX) 100 MG tablet, Take 1 tablet (100 mg total) by mouth every evening., Disp: 30 tablet, Rfl: 11    topiramate (TOPAMAX) 25 MG tablet, Take 1 tablet (25 mg total) by mouth once daily., Disp: 30 tablet, Rfl: 11    Review of Systems - as per HPI, otherwise a balanced 10 systems review is negative.    OBJECTIVE:  Vitals:  /84   Pulse 75   Ht 6' 4" (1.93 m)   Wt (!) 143.8 kg (317 lb)   BMI " 38.59 kg/m²     Physical Exam   Constitutional: he appears well-developed and well-nourished. he is well groomed. NAD  HENT:    Head: Normocephalic and atraumatic, Frontalis was NTTP, temporalis was NTTP   Eyes: Conjunctivae and EOM are normal. Pupils are equal, round, and reactive to light   Neck: Neck supple. Occiput and trapezius NTTP   Musculoskeletal: Normal range of motion. No joint stiffness. No vertebral point tenderness.  Skin: Skin is warm and dry.  Psychiatric: Normal mood and affect.     Neuro exam:    Mental status:  The patient is alert and oriented to person, place and time. ?avoids eye contact  Language is intact and fluent ?speaks in short, quick words/responses  Remote and recent memory are intact  Normal attention and concentration  Mood is stable    Cranial Nerves:  Fundoscopic examination does not reveal any occult papilledema.    Pupils are equal and reactive to light.    Extraocular movements are intact and without nystagmus.    Visual fields are full to confrontation testing.   Facial movement is symmetric.  Facial sensation is intact.    Hearing is intact   FROM of neck in all (6) directions without pain  Shoulder shrug symmetrical.    Coordination:     Finger to nose - normal and symmetric bilaterally   Heel to shin test - normal and symmetric bilaterally     Motor:  Normal muscle bulk and symmetry. No fasciculations were noted.   Tremor not apparent   Pronator drift not apparent.    strength was strong and symmetric  Finger extension strength was strong and symmetric  RUE:appropriate against gravity and medium force as tested 5/5  LUE: appropriate against gravity and medium force as tested 5/5  RLE:appropriate against gravity and medium force as tested 5/5              LLE: appropriate against gravity and medium force as tested 5/5    Reflexes:  Right Brachioradialis 2+  Left Brachioradialis 2+  Right Biceps 2+  Left Biceps 2+  Right Patellar2+  Left Patellar 2+      Sensory:  RUE   intact light touch  LUE intact light touch  RLE intact light touch  LLE intact light touch    Gait:   Romberg - negative  Normal gait  Tandem, Heel, and Toe Walk - able to perform without difficulty    Review of Data:   Notes from neurology, pcp reviewed   Labs:  No visits with results within 3 Month(s) from this visit.   Latest known visit with results is:   Lab Visit on 05/13/2024   Component Date Value Ref Range Status    Hemoglobin A1C 05/13/2024 5.9 (H)  4.0 - 5.6 % Final    Estimated Avg Glucose 05/13/2024 123  68 - 131 mg/dL Final    WBC 05/13/2024 4.92  3.90 - 12.70 K/uL Final    RBC 05/13/2024 4.99  4.60 - 6.20 M/uL Final    Hemoglobin 05/13/2024 11.0 (L)  14.0 - 18.0 g/dL Final    Hematocrit 05/13/2024 35.8 (L)  40.0 - 54.0 % Final    MCV 05/13/2024 72 (L)  82 - 98 fL Final    MCH 05/13/2024 22.0 (L)  27.0 - 31.0 pg Final    MCHC 05/13/2024 30.7 (L)  32.0 - 36.0 g/dL Final    RDW 05/13/2024 20.3 (H)  11.5 - 14.5 % Final    Platelets 05/13/2024 243  150 - 450 K/uL Final    MPV 05/13/2024 9.8  9.2 - 12.9 fL Final    Immature Granulocytes 05/13/2024 0.6 (H)  0.0 - 0.5 % Final    Gran # (ANC) 05/13/2024 2.4  1.8 - 7.7 K/uL Final    Immature Grans (Abs) 05/13/2024 0.03  0.00 - 0.04 K/uL Final    Lymph # 05/13/2024 1.9  1.0 - 4.8 K/uL Final    Mono # 05/13/2024 0.4  0.3 - 1.0 K/uL Final    Eos # 05/13/2024 0.2  0.0 - 0.5 K/uL Final    Baso # 05/13/2024 0.03  0.00 - 0.20 K/uL Final    nRBC 05/13/2024 0  0 /100 WBC Final    Gran % 05/13/2024 49.4  38.0 - 73.0 % Final    Lymph % 05/13/2024 37.6  18.0 - 48.0 % Final    Mono % 05/13/2024 7.5  4.0 - 15.0 % Final    Eosinophil % 05/13/2024 4.3  0.0 - 8.0 % Final    Basophil % 05/13/2024 0.6  0.0 - 1.9 % Final    Differential Method 05/13/2024 Automated   Final    Sodium 05/13/2024 142  136 - 145 mmol/L Final    Potassium 05/13/2024 3.9  3.5 - 5.1 mmol/L Final    Chloride 05/13/2024 110  95 - 110 mmol/L Final    CO2 05/13/2024 21 (L)  23 - 29 mmol/L Final    Glucose  05/13/2024 80  70 - 110 mg/dL Final    BUN 05/13/2024 11  6 - 20 mg/dL Final    Creatinine 05/13/2024 1.0  0.5 - 1.4 mg/dL Final    Calcium 05/13/2024 9.7  8.7 - 10.5 mg/dL Final    Total Protein 05/13/2024 7.8  6.0 - 8.4 g/dL Final    Albumin 05/13/2024 4.4  3.5 - 5.2 g/dL Final    Total Bilirubin 05/13/2024 0.4  0.1 - 1.0 mg/dL Final    Alkaline Phosphatase 05/13/2024 39 (L)  55 - 135 U/L Final    AST 05/13/2024 46 (H)  10 - 40 U/L Final    ALT 05/13/2024 73 (H)  10 - 44 U/L Final    eGFR 05/13/2024 >60  >60 mL/min/1.73 m^2 Final    Anion Gap 05/13/2024 11  8 - 16 mmol/L Final    Cholesterol 05/13/2024 178  120 - 199 mg/dL Final    Triglycerides 05/13/2024 119  30 - 150 mg/dL Final    HDL 05/13/2024 44  40 - 75 mg/dL Final    LDL Cholesterol 05/13/2024 110.2  63.0 - 159.0 mg/dL Final    HDL/Cholesterol Ratio 05/13/2024 24.7  20.0 - 50.0 % Final    Total Cholesterol/HDL Ratio 05/13/2024 4.0  2.0 - 5.0 Final    Non-HDL Cholesterol 05/13/2024 134  mg/dL Final    TSH 05/13/2024 1.479  0.400 - 4.000 uIU/mL Final    Specimen UA 05/13/2024 Urine, Clean Catch   Final    Color, UA 05/13/2024 Yellow  Yellow, Straw, Griselda Final    Appearance, UA 05/13/2024 Clear  Clear Final    pH, UA 05/13/2024 6.0  5.0 - 8.0 Final    Specific Gravity, UA 05/13/2024 1.020  1.005 - 1.030 Final    Protein, UA 05/13/2024 Negative  Negative Final    Glucose, UA 05/13/2024 Negative  Negative Final    Ketones, UA 05/13/2024 Negative  Negative Final    Bilirubin (UA) 05/13/2024 Negative  Negative Final    Occult Blood UA 05/13/2024 Negative  Negative Final    Nitrite, UA 05/13/2024 Negative  Negative Final    Urobilinogen, UA 05/13/2024 Negative  <2.0 EU/dL Final    Leukocytes, UA 05/13/2024 Negative  Negative Final    Microalbumin, Urine 05/13/2024 15.0  ug/mL Final    Creatinine, Urine 05/13/2024 175.0  23.0 - 375.0 mg/dL Final    Microalb/Creat Ratio 05/13/2024 8.6  0.0 - 30.0 ug/mg Final     Imaging:  No results found for this or any previous  visit.  Note: I have independently reviewed any/all imaging/labs/tests and agree with the report (s) as documented.  Any discrepancies will be as noted/demarcated by free text.  LEONOR BOSS 8/16/2024    ASSESSMENT:  1. Chronic migraine without aura without status migrainosus, not intractable    2. Autism spectrum disorder          PLAN:  - Discussed symptoms appear to be consistent with chronic migraines, discussed treatment options and patient agreed with the following plan:    - prevention: increase topamax to 125mg daily. May increase dose until headaches more well controlled. Continue propranolol as prescribed by pcp. Pt will notify me if pcp would like me to take over this prescription in the future.   - rescue: may continue tylenol prn   - Discussed avoiding rebound headache by limited tylenol to less than 15days per month.   - Start tracking migraines - headache diary given with example provided. Explained to patient's mother that a calendar could also be used with check marks on days he has any head pain.   - risks, benefits, and potential side effects of topamax discussed   - alternative treatment options offered   - importance of healthy diet, regular exercise and sleep hygiene in the treatment of headaches     - RTC in 3 months     Orders Placed This Encounter    topiramate (TOPAMAX) 100 MG tablet    topiramate (TOPAMAX) 25 MG tablet       I have discussed realistic goals of care with patient at length as well as medication options, and need for lifestyle adjustment. I have explained that treatment will take time. We have agreed that the goal will be to reduce frequency/intensity/quantity of HA, not to be completely HA free. I have explained my non narcotic policy regarding headache treatment.    Patient agreeable to work on lifestyle adjustments.    Discussed potential for teratogenicity with treatment, patient understands if her family planning status should change she will contact office immediately and  we will safely adjust medications as needed.     Questions and concerns were sought and answered to the patient's stated verbal satisfaction.  The patient verbalizes understanding and agreement with the above stated treatment plan.     CC: Karla Arce MD Monique Smith, [facility]  [facility]  [phone]    Dr. Lux was available during today's encounter.     I spent a total of 31 minutes on the day of the visit.This includes face to face time and non-face to face time preparing to see the patient (eg, review of tests), obtaining and/or reviewing separately obtained history, documenting clinical information in the electronic or other health record, independently interpreting results and communicating results to the patient/family/caregiver, or care coordinator.

## 2024-09-06 ENCOUNTER — PATIENT OUTREACH (OUTPATIENT)
Dept: ADMINISTRATIVE | Facility: HOSPITAL | Age: 34
End: 2024-09-06
Payer: MEDICAID

## 2024-10-01 ENCOUNTER — PATIENT MESSAGE (OUTPATIENT)
Dept: INTERNAL MEDICINE | Facility: CLINIC | Age: 34
End: 2024-10-01
Payer: MEDICAID

## 2024-10-01 DIAGNOSIS — E11.69 TYPE 2 DIABETES MELLITUS WITH OBESITY: ICD-10-CM

## 2024-10-01 DIAGNOSIS — E66.9 TYPE 2 DIABETES MELLITUS WITH OBESITY: ICD-10-CM

## 2024-10-01 NOTE — TELEPHONE ENCOUNTER
No care due was identified.  [facility] Embedded Care Due Messages. Reference number: [id number].   10/01/2024 10:45:50 AM CDT

## 2024-10-02 RX ORDER — SEMAGLUTIDE 1.34 MG/ML
1 INJECTION, SOLUTION SUBCUTANEOUS WEEKLY
Qty: 3 ML | Refills: 6 | Status: SHIPPED | OUTPATIENT
Start: 2024-10-02

## 2024-11-20 DIAGNOSIS — E11.9 TYPE 2 DIABETES MELLITUS WITHOUT COMPLICATION: ICD-10-CM

## 2024-11-21 NOTE — PROGRESS NOTES
"  Established Patient   SUBJECTIVE:  Patient ID: Seun Wynne   Chief Complaint: No chief complaint on file.     History of Present Illness:  Seun Wynne is a 34 y.o. male who presents to clinic with mother for follow-up of headaches.         11/22/2024 - Interval History:  Higher topamax seems to be working. Pt denies feeling any negative effects of medication. Nods his head in agreement that he "thinks" headaches are better. Mother would like to stick with same regimen at this time and consider CGRP/botox in the future. Pt takes ozempic currently, so would be open to injectables. Pt nods head in agreement with plan. Mother feels like he is taking less tylenol. She doesn't always see when he takes it but feels certain that its less than 15x/month. Does not want additional rescue medication.   Mother reports still taking propranolol as prescribed by pcp.         Recommendations made at last Office Visit on 8/16/24:  - prevention: increase topamax to 125mg daily. May increase dose until headaches more well controlled. Continue propranolol as prescribed by pcp. Pt will notify me if pcp would like me to take over this prescription in the future.   - rescue: may continue tylenol prn   - Discussed avoiding rebound headache by limited tylenol to less than 15days per month.   - Start tracking migraines - headache diary given with example provided.     History of Present Illness:   33 y.o. male with chronic migraines, Asperger syndrome, social anxiety, HTN, DM2 (controlled w/ ozempic), HLD, alpha thalassemia, fatty liver disease, heartburn, who presents to clinic with mother for evaluation of headaches.      Patient participated in appointment and was able to give some descriptors of his headaches, however, mother was primary historian during appointment.      Patient is a previous patient of Dr. Hampton, transferring care to me for treatment of chronic migraines. Current regimen includes: topamax 100mg     Pt " also prescribed the following meds by pcp: lexapro 20mg, losartan 25m, Propranolol 60mg, zoloft 100mg, wellbutrin 75mg     Patient mother reports giving him tylenol 2-4x/month with severe headache but mother reports patient may be taking it more often without her knowledge. Pt unable to state how often he is having headaches but feels like his medication could be working better.         PMHx negative for TBI, Meningitis, Aneurysms, Kidney Stones, asthma, GI bleed, osteoporosis, CAD/MI, CVA/TIA, cancer      Family Hx negative for Migraines      Headache History:  Onset - 28yo  Previous Hx of HA -   Location/Radiation - bilateral frontalis   Quality - pulsing, sometimes sharp   Duration - can last a whole day   Intensity (range) - moderate to severe pain   Frequency - unable to determine; however when questioned, feels like they could be better; 0/30 are debilitating  Triggers - none identified  Aggravating Factors - sounds  Alleviating Factors - laying down, rest, quiet  Recent Changes - no  Prodrome/Aura - no  HA today? - no  Time of day of most headaches- anytime  Sleep - no snoring, wakes feeling refreshed, resolution of headache with sleep     Associated symptoms with the headache:   Meningeal symptoms - photophobia, phonophobia, exercise intolerance   Nausea/vomitting  Nasal drainage   Visual blurriness   Pallor/flushing  Dizziness   Vertigo  Confusion  Impaired concentration   Pain worsened with physical activity   Neck pain      Treatments Tried   Tylenol   Topamax 125mg   Propranolol 60mg   Losartan   Wellbutrin   Zoloft   Zofran      Current Medications:    Current Medications      Current Outpatient Medications:     buPROPion (WELLBUTRIN) 75 MG tablet, Take 75 mg by mouth every morning., Disp: , Rfl:     cholecalciferol, vitamin D3, (VITAMIN D3) 50 mcg (2,000 unit) Cap, Take 1 capsule by mouth once daily., Disp: , Rfl:     losartan (COZAAR) 25 MG tablet, Take 1 tablet (25 mg total) by mouth once daily.,  Disp: 90 tablet, Rfl: 3    multivitamin capsule, Take 1 capsule by mouth once daily., Disp: , Rfl:     ondansetron (ZOFRAN-ODT) 8 MG TbDL, Take 1 tablet (8 mg total) by mouth every 12 (twelve) hours as needed (nausea or vomiting)., Disp: 30 tablet, Rfl: 0    pantoprazole (PROTONIX) 40 MG tablet, Take 1 tablet (40 mg total) by mouth once daily., Disp: 90 tablet, Rfl: 3    propranoloL (INDERAL LA) 60 MG 24 hr capsule, Take 1 capsule (60 mg total) by mouth once daily., Disp: 90 capsule, Rfl: 3    rosuvastatin (CRESTOR) 40 MG Tab, Take 1 tablet by mouth once daily, Disp: 90 tablet, Rfl: 3    semaglutide (OZEMPIC) 1 mg/dose (4 mg/3 mL), Inject 1 mg into the skin once a week., Disp: 3 mL, Rfl: 6    sertraline (ZOLOFT) 100 MG tablet, Take 100 mg by mouth., Disp: , Rfl:     topiramate (TOPAMAX) 100 MG tablet, Take 1 tablet (100 mg total) by mouth every evening., Disp: 30 tablet, Rfl: 11    topiramate (TOPAMAX) 25 MG tablet, Take 1 tablet (25 mg total) by mouth once daily., Disp: 30 tablet, Rfl: 11        Review of Systems - as per HPI, otherwise a balanced 10 systems review is negative.     OBJECTIVE:  Vitals:  There were no vitals taken for this visit.      Physical Exam:  Constitutional: he appears well-developed and well-nourished. he is well groomed. NAD   HENT:    Head: Normocephalic and atraumatic  Eyes: Conjunctivae and EOM are normal  Musculoskeletal: Normal range of motion. No joint stiffness.   Skin: Skin is warm and dry.  Psychiatric: Mood and affect are normal    Neuro: Patient is alert and oriented to person, place, and time. Language is intact and fluent. Speech is clear and fluent. Recent and remote memory are intact.  Normal attention and concentration.  Facial movement is symmetric. Moves all 4 extremities against gravity. Gait and station normal.  Cranial Nerves II through XII without focal deficit.      Review of Data:   Labs:          No visits with results within 3 Month(s) from this visit.   Latest  known visit with results is:   Lab Visit on 05/13/2024   Component Date Value Ref Range Status    Hemoglobin A1C 05/13/2024 5.9 (H)  4.0 - 5.6 % Final    Estimated Avg Glucose 05/13/2024 123  68 - 131 mg/dL Final    WBC 05/13/2024 4.92  3.90 - 12.70 K/uL Final    RBC 05/13/2024 4.99  4.60 - 6.20 M/uL Final    Hemoglobin 05/13/2024 11.0 (L)  14.0 - 18.0 g/dL Final    Hematocrit 05/13/2024 35.8 (L)  40.0 - 54.0 % Final    MCV 05/13/2024 72 (L)  82 - 98 fL Final    MCH 05/13/2024 22.0 (L)  27.0 - 31.0 pg Final    MCHC 05/13/2024 30.7 (L)  32.0 - 36.0 g/dL Final    RDW 05/13/2024 20.3 (H)  11.5 - 14.5 % Final    Platelets 05/13/2024 243  150 - 450 K/uL Final    MPV 05/13/2024 9.8  9.2 - 12.9 fL Final    Immature Granulocytes 05/13/2024 0.6 (H)  0.0 - 0.5 % Final    Gran # (ANC) 05/13/2024 2.4  1.8 - 7.7 K/uL Final    Immature Grans (Abs) 05/13/2024 0.03  0.00 - 0.04 K/uL Final    Lymph # 05/13/2024 1.9  1.0 - 4.8 K/uL Final    Mono # 05/13/2024 0.4  0.3 - 1.0 K/uL Final    Eos # 05/13/2024 0.2  0.0 - 0.5 K/uL Final    Baso # 05/13/2024 0.03  0.00 - 0.20 K/uL Final    nRBC 05/13/2024 0  0 /100 WBC Final    Gran % 05/13/2024 49.4  38.0 - 73.0 % Final    Lymph % 05/13/2024 37.6  18.0 - 48.0 % Final    Mono % 05/13/2024 7.5  4.0 - 15.0 % Final    Eosinophil % 05/13/2024 4.3  0.0 - 8.0 % Final    Basophil % 05/13/2024 0.6  0.0 - 1.9 % Final    Differential Method 05/13/2024 Automated    Final    Sodium 05/13/2024 142  136 - 145 mmol/L Final    Potassium 05/13/2024 3.9  3.5 - 5.1 mmol/L Final    Chloride 05/13/2024 110  95 - 110 mmol/L Final    CO2 05/13/2024 21 (L)  23 - 29 mmol/L Final    Glucose 05/13/2024 80  70 - 110 mg/dL Final    BUN 05/13/2024 11  6 - 20 mg/dL Final    Creatinine 05/13/2024 1.0  0.5 - 1.4 mg/dL Final    Calcium 05/13/2024 9.7  8.7 - 10.5 mg/dL Final    Total Protein 05/13/2024 7.8  6.0 - 8.4 g/dL Final    Albumin 05/13/2024 4.4  3.5 - 5.2 g/dL Final    Total Bilirubin 05/13/2024 0.4  0.1 - 1.0 mg/dL  Final    Alkaline Phosphatase 05/13/2024 39 (L)  55 - 135 U/L Final    AST 05/13/2024 46 (H)  10 - 40 U/L Final    ALT 05/13/2024 73 (H)  10 - 44 U/L Final    eGFR 05/13/2024 >60  >60 mL/min/1.73 m^2 Final    Anion Gap 05/13/2024 11  8 - 16 mmol/L Final    Cholesterol 05/13/2024 178  120 - 199 mg/dL Final    Triglycerides 05/13/2024 119  30 - 150 mg/dL Final    HDL 05/13/2024 44  40 - 75 mg/dL Final    LDL Cholesterol 05/13/2024 110.2  63.0 - 159.0 mg/dL Final    HDL/Cholesterol Ratio 05/13/2024 24.7  20.0 - 50.0 % Final    Total Cholesterol/HDL Ratio 05/13/2024 4.0  2.0 - 5.0 Final    Non-HDL Cholesterol 05/13/2024 134  mg/dL Final    TSH 05/13/2024 1.479  0.400 - 4.000 uIU/mL Final    Specimen UA 05/13/2024 Urine, Clean Catch    Final    Color, UA 05/13/2024 Yellow  Yellow, Straw, Griselda Final    Appearance, UA 05/13/2024 Clear  Clear Final    pH, UA 05/13/2024 6.0  5.0 - 8.0 Final    Specific Gravity, UA 05/13/2024 1.020  1.005 - 1.030 Final    Protein, UA 05/13/2024 Negative  Negative Final    Glucose, UA 05/13/2024 Negative  Negative Final    Ketones, UA 05/13/2024 Negative  Negative Final    Bilirubin (UA) 05/13/2024 Negative  Negative Final    Occult Blood UA 05/13/2024 Negative  Negative Final    Nitrite, UA 05/13/2024 Negative  Negative Final    Urobilinogen, UA 05/13/2024 Negative  <2.0 EU/dL Final    Leukocytes, UA 05/13/2024 Negative  Negative Final    Microalbumin, Urine 05/13/2024 15.0  ug/mL Final    Creatinine, Urine 05/13/2024 175.0  23.0 - 375.0 mg/dL Final    Microalb/Creat Ratio 05/13/2024 8.6  0.0 - 30.0 ug/mg Final      Imaging:  No results found for this or any previous visit.  Note: I have independently reviewed any/all imaging/labs/tests and agree with the report (s) as documented.  Any discrepancies will be as noted/demarcated by free text.  GERA, FNP-C 11/17/2024     ASSESSMENT:  Chronic Migraine without Aura G43.709     PLAN:  - preventative: continue topamax 125mg/night. Can consider  cgrp/botox in the future if additional medication required.   - rescue: continue tylenol prn. Discussed avoiding rebound headache by limiting the use of all over-the-counter medications to less than 15 days/month.   - Continue tracking headaches   - Discussed goals of therapy are to decrease the frequency, intensity, and duration of headaches  - RTC in 3 months.             Questions and concerns were sought and answered to the patient's stated verbal satisfaction.  The patient verbalizes understanding and agreement with the above stated treatment plan.      CC: Karla Arce MD Monique Smith, P-C  [facility]   [phone]     Dr. Lux was available during today's encounter.     I spent a total of 20 minutes on the day of the visit.  This includes face to face time and non-face to face time preparing to see the patient (eg, review of tests), obtaining and/or reviewing separately obtained history, documenting clinical information in the electronic or other health record, independently interpreting results and communicating results to the patient/family/caregiver, or care coordinator.

## 2024-11-22 ENCOUNTER — OFFICE VISIT (OUTPATIENT)
Dept: NEUROLOGY | Facility: CLINIC | Age: 34
End: 2024-11-22
Payer: MEDICAID

## 2024-11-22 VITALS
HEART RATE: 77 BPM | SYSTOLIC BLOOD PRESSURE: 118 MMHG | BODY MASS INDEX: 38.36 KG/M2 | WEIGHT: 315 LBS | DIASTOLIC BLOOD PRESSURE: 81 MMHG | HEIGHT: 76 IN

## 2024-11-22 DIAGNOSIS — G43.709 CHRONIC MIGRAINE WITHOUT AURA WITHOUT STATUS MIGRAINOSUS, NOT INTRACTABLE: Primary | ICD-10-CM

## 2024-11-22 PROCEDURE — 99999 PR PBB SHADOW E&M-EST. PATIENT-LVL III: CPT | Mod: PBBFAC,,,

## 2024-11-22 PROCEDURE — 99213 OFFICE O/P EST LOW 20 MIN: CPT | Mod: PBBFAC

## 2024-12-03 ENCOUNTER — OFFICE VISIT (OUTPATIENT)
Dept: INTERNAL MEDICINE | Facility: CLINIC | Age: 34
End: 2024-12-03
Payer: MEDICAID

## 2024-12-03 ENCOUNTER — LAB VISIT (OUTPATIENT)
Dept: LAB | Facility: HOSPITAL | Age: 34
End: 2024-12-03
Attending: INTERNAL MEDICINE
Payer: MEDICAID

## 2024-12-03 VITALS
BODY MASS INDEX: 38.36 KG/M2 | DIASTOLIC BLOOD PRESSURE: 84 MMHG | SYSTOLIC BLOOD PRESSURE: 112 MMHG | TEMPERATURE: 97 F | HEIGHT: 76 IN | HEART RATE: 76 BPM | RESPIRATION RATE: 18 BRPM | WEIGHT: 315 LBS | OXYGEN SATURATION: 99 %

## 2024-12-03 DIAGNOSIS — I15.2 HYPERTENSION ASSOCIATED WITH DIABETES: ICD-10-CM

## 2024-12-03 DIAGNOSIS — E11.59 HYPERTENSION ASSOCIATED WITH DIABETES: ICD-10-CM

## 2024-12-03 DIAGNOSIS — E11.69 TYPE 2 DIABETES MELLITUS WITH OBESITY: ICD-10-CM

## 2024-12-03 DIAGNOSIS — E66.9 TYPE 2 DIABETES MELLITUS WITH OBESITY: ICD-10-CM

## 2024-12-03 DIAGNOSIS — E78.5 HYPERLIPIDEMIA ASSOCIATED WITH TYPE 2 DIABETES MELLITUS: ICD-10-CM

## 2024-12-03 DIAGNOSIS — E66.9 TYPE 2 DIABETES MELLITUS WITH OBESITY: Primary | ICD-10-CM

## 2024-12-03 DIAGNOSIS — Z23 NEED FOR VACCINATION: ICD-10-CM

## 2024-12-03 DIAGNOSIS — E11.69 HYPERLIPIDEMIA ASSOCIATED WITH TYPE 2 DIABETES MELLITUS: ICD-10-CM

## 2024-12-03 DIAGNOSIS — E11.69 TYPE 2 DIABETES MELLITUS WITH OBESITY: Primary | ICD-10-CM

## 2024-12-03 LAB
ANION GAP SERPL CALC-SCNC: 10 MMOL/L (ref 8–16)
BASOPHILS # BLD AUTO: 0.04 K/UL (ref 0–0.2)
BASOPHILS NFR BLD: 0.6 % (ref 0–1.9)
BUN SERPL-MCNC: 11 MG/DL (ref 6–20)
CALCIUM SERPL-MCNC: 10 MG/DL (ref 8.7–10.5)
CHLORIDE SERPL-SCNC: 108 MMOL/L (ref 95–110)
CO2 SERPL-SCNC: 22 MMOL/L (ref 23–29)
CREAT SERPL-MCNC: 1 MG/DL (ref 0.5–1.4)
DIFFERENTIAL METHOD BLD: ABNORMAL
EOSINOPHIL # BLD AUTO: 0.2 K/UL (ref 0–0.5)
EOSINOPHIL NFR BLD: 3.2 % (ref 0–8)
ERYTHROCYTE [DISTWIDTH] IN BLOOD BY AUTOMATED COUNT: 21.2 % (ref 11.5–14.5)
EST. GFR  (NO RACE VARIABLE): >60 ML/MIN/1.73 M^2
ESTIMATED AVG GLUCOSE: 123 MG/DL (ref 68–131)
GLUCOSE SERPL-MCNC: 85 MG/DL (ref 70–110)
HBA1C MFR BLD: 5.9 % (ref 4–5.6)
HCT VFR BLD AUTO: 39 % (ref 40–54)
HGB BLD-MCNC: 11.3 G/DL (ref 14–18)
IMM GRANULOCYTES # BLD AUTO: 0.06 K/UL (ref 0–0.04)
IMM GRANULOCYTES NFR BLD AUTO: 1 % (ref 0–0.5)
LYMPHOCYTES # BLD AUTO: 2.1 K/UL (ref 1–4.8)
LYMPHOCYTES NFR BLD: 32.9 % (ref 18–48)
MCH RBC QN AUTO: 21.7 PG (ref 27–31)
MCHC RBC AUTO-ENTMCNC: 29 G/DL (ref 32–36)
MCV RBC AUTO: 75 FL (ref 82–98)
MONOCYTES # BLD AUTO: 0.5 K/UL (ref 0.3–1)
MONOCYTES NFR BLD: 8.6 % (ref 4–15)
NEUTROPHILS # BLD AUTO: 3.4 K/UL (ref 1.8–7.7)
NEUTROPHILS NFR BLD: 53.7 % (ref 38–73)
NRBC BLD-RTO: 0 /100 WBC
PLATELET # BLD AUTO: 279 K/UL (ref 150–450)
PMV BLD AUTO: 10.7 FL (ref 9.2–12.9)
POTASSIUM SERPL-SCNC: 4 MMOL/L (ref 3.5–5.1)
RBC # BLD AUTO: 5.2 M/UL (ref 4.6–6.2)
SODIUM SERPL-SCNC: 140 MMOL/L (ref 136–145)
WBC # BLD AUTO: 6.3 K/UL (ref 3.9–12.7)

## 2024-12-03 PROCEDURE — 85025 COMPLETE CBC W/AUTO DIFF WBC: CPT | Performed by: INTERNAL MEDICINE

## 2024-12-03 PROCEDURE — 3008F BODY MASS INDEX DOCD: CPT | Mod: CPTII,,, | Performed by: INTERNAL MEDICINE

## 2024-12-03 PROCEDURE — 99214 OFFICE O/P EST MOD 30 MIN: CPT | Mod: PBBFAC,PO | Performed by: INTERNAL MEDICINE

## 2024-12-03 PROCEDURE — 3066F NEPHROPATHY DOC TX: CPT | Mod: CPTII,,, | Performed by: INTERNAL MEDICINE

## 2024-12-03 PROCEDURE — 3074F SYST BP LT 130 MM HG: CPT | Mod: CPTII,,, | Performed by: INTERNAL MEDICINE

## 2024-12-03 PROCEDURE — 1159F MED LIST DOCD IN RCRD: CPT | Mod: CPTII,,, | Performed by: INTERNAL MEDICINE

## 2024-12-03 PROCEDURE — 83036 HEMOGLOBIN GLYCOSYLATED A1C: CPT | Performed by: INTERNAL MEDICINE

## 2024-12-03 PROCEDURE — 99999 PR PBB SHADOW E&M-EST. PATIENT-LVL IV: CPT | Mod: PBBFAC,,, | Performed by: INTERNAL MEDICINE

## 2024-12-03 PROCEDURE — 3079F DIAST BP 80-89 MM HG: CPT | Mod: CPTII,,, | Performed by: INTERNAL MEDICINE

## 2024-12-03 PROCEDURE — 1160F RVW MEDS BY RX/DR IN RCRD: CPT | Mod: CPTII,,, | Performed by: INTERNAL MEDICINE

## 2024-12-03 PROCEDURE — 3061F NEG MICROALBUMINURIA REV: CPT | Mod: CPTII,,, | Performed by: INTERNAL MEDICINE

## 2024-12-03 PROCEDURE — 4010F ACE/ARB THERAPY RXD/TAKEN: CPT | Mod: CPTII,,, | Performed by: INTERNAL MEDICINE

## 2024-12-03 PROCEDURE — 99214 OFFICE O/P EST MOD 30 MIN: CPT | Mod: S$PBB,,, | Performed by: INTERNAL MEDICINE

## 2024-12-03 PROCEDURE — 3044F HG A1C LEVEL LT 7.0%: CPT | Mod: CPTII,,, | Performed by: INTERNAL MEDICINE

## 2024-12-03 PROCEDURE — 80048 BASIC METABOLIC PNL TOTAL CA: CPT | Performed by: INTERNAL MEDICINE

## 2024-12-03 PROCEDURE — 99999PBSHW INFLUENZA - TRIVALENT - PF (ADULT): Mod: PBBFAC,,,

## 2024-12-03 PROCEDURE — 36415 COLL VENOUS BLD VENIPUNCTURE: CPT | Mod: PO | Performed by: INTERNAL MEDICINE

## 2024-12-03 PROCEDURE — 90656 IIV3 VACC NO PRSV 0.5 ML IM: CPT | Mod: PBBFAC,PO

## 2024-12-03 RX ORDER — TIRZEPATIDE 5 MG/.5ML
5 INJECTION, SOLUTION SUBCUTANEOUS
Qty: 2 ML | Refills: 0 | Status: SHIPPED | OUTPATIENT
Start: 2024-12-03

## 2024-12-03 NOTE — PROGRESS NOTES
Subjective:     Seun Wynne is a 34 y.o. male who presents for   Chief Complaint   Patient presents with    Follow-up    Hypertension    Diabetes    Hyperlipidemia   He is accompanied by his mother Alis Wynne.    [facility]    Psychiatry NP is Dang Baptiste    Hypertension: The patient has been taking medications as instructed, no medication side effects noted.    BP Readings from Last 3 Encounters:   12/03/24 112/84   11/22/24 118/81   08/16/24 131/84       Diabetes: Patient presents for follow up of diabetes. Current symptoms include: none. Symptoms have been well-controlled. Evaluation to date has included: hemoglobin A1C.  Current treatment: no recent interventions. Last dilated eye exam: due.    Lab Results   Component Value Date    HGBA1C 5.9 (H) 12/03/2024    GLU 85 12/03/2024       Hyperlipidemia: Compliance with treatment has been good. The patient exercises never. Previous history of cardiac disease includes: none.    Lab Results   Component Value Date    CHOL 178 05/13/2024    HDL 44 05/13/2024        The patient's mother is concerned that Seun's weight has increased even though he takes semaglutide weekly. She has noticed no difference in his appetite.    Body mass index is 39.05 kg/m².  Wt Readings from Last 3 Encounters:   12/03/24 (!) 145.5 kg (320 lb 12.3 oz)   11/22/24 (!) 143.8 kg (317 lb 0.3 oz)   08/16/24 (!) 143.8 kg (317 lb)       Review of Systems   Reason unable to perform ROS: patient only speaks a few words, mom helps with response.   Constitutional:  Positive for unexpected weight change (weight gain).   Respiratory:  Negative for cough.    Cardiovascular:  Negative for leg swelling.   Gastrointestinal:  Negative for abdominal pain.          Objective:     Physical Exam  Constitutional:       Appearance: Normal appearance. He is well-developed and well-groomed.   HENT:      Head: Normocephalic and atraumatic.      Right Ear: Hearing and external ear normal.      Left Ear: Hearing  and external ear normal.      Nose: Nose normal.      Mouth/Throat:      Mouth: Mucous membranes are moist.   Eyes:      General: Lids are normal.      Conjunctiva/sclera:      Right eye: No hemorrhage.     Left eye: No hemorrhage.     Pupils: Pupils are equal, round, and reactive to light.   Cardiovascular:      Rate and Rhythm: Normal rate and regular rhythm.      Heart sounds: No murmur heard.  Pulmonary:      Effort: Pulmonary effort is normal. No respiratory distress.      Breath sounds: Normal breath sounds. No decreased breath sounds or wheezing.   Abdominal:      General: Bowel sounds are normal. There is no distension.   Musculoskeletal:         General: Normal range of motion.      Right lower leg: No edema.      Left lower leg: No edema.   Skin:     General: Skin is warm and dry.   Neurological:      Mental Status: He is alert. Mental status is at baseline.   Psychiatric:         Mood and Affect: Mood normal.         Speech: Noncommunicative: delayed response. Speech is delayed.            Assessment:      1. Type 2 diabetes mellitus with obesity    2. Hypertension associated with diabetes    3. Hyperlipidemia associated with type 2 diabetes mellitus    4. Need for vaccination           Plan:     1. Type 2 diabetes mellitus with obesity  - weight has increased, check labs, change from semaglutide to tirzepatide 5mg weekly  - Hemoglobin A1C; Future  - CBC Auto Differential; Future  - Basic Metabolic Panel; Future  - tirzepatide (MOUNJARO) 5 mg/0.5 mL PnIj; Inject 5 mg into the skin every 7 days.  Dispense: 2 mL; Refill: 0    2. Hypertension associated with diabetes  - BP is controlled, continue losartan  - CBC Auto Differential; Future  - Basic Metabolic Panel; Future    3. Hyperlipidemia associated with type 2 diabetes mellitus  - LDL above goal even with Crestor, mom reports compliance  - will consider adding Zetia after labs reviewed with annual exam    4. Need for vaccination  - Influenza - Trivalent -  PF (ADULT)    RTC in May 2025 for follow-up or sooner if needed    __________________________    Karla Arce MD, PharmD  [facility]- [facility]  American Board of Obesity Medicine diplomate  Office [phone]

## 2024-12-07 DIAGNOSIS — Z00.00 ANNUAL PHYSICAL EXAM: ICD-10-CM

## 2024-12-07 DIAGNOSIS — E11.69 TYPE 2 DIABETES MELLITUS WITH OBESITY: Primary | ICD-10-CM

## 2024-12-07 DIAGNOSIS — I15.2 HYPERTENSION ASSOCIATED WITH DIABETES: ICD-10-CM

## 2024-12-07 DIAGNOSIS — E11.59 HYPERTENSION ASSOCIATED WITH DIABETES: ICD-10-CM

## 2024-12-07 DIAGNOSIS — E66.9 TYPE 2 DIABETES MELLITUS WITH OBESITY: Primary | ICD-10-CM

## 2024-12-07 RX ORDER — BUPROPION HYDROCHLORIDE 200 MG/1
200 TABLET, EXTENDED RELEASE ORAL EVERY MORNING
COMMUNITY
Start: 2024-11-01

## 2024-12-20 ENCOUNTER — PATIENT OUTREACH (OUTPATIENT)
Dept: ADMINISTRATIVE | Facility: HOSPITAL | Age: 34
End: 2024-12-20
Payer: MEDICAID

## 2025-01-14 ENCOUNTER — TELEPHONE (OUTPATIENT)
Dept: INTERNAL MEDICINE | Facility: CLINIC | Age: 35
End: 2025-01-14
Payer: MEDICAID

## 2025-01-14 DIAGNOSIS — E11.69 TYPE 2 DIABETES MELLITUS WITH OBESITY: ICD-10-CM

## 2025-01-14 DIAGNOSIS — E66.9 TYPE 2 DIABETES MELLITUS WITH OBESITY: ICD-10-CM

## 2025-01-14 RX ORDER — TIRZEPATIDE 5 MG/.5ML
5 INJECTION, SOLUTION SUBCUTANEOUS
Qty: 2 ML | Refills: 0 | Status: CANCELLED | OUTPATIENT
Start: 2025-01-14

## 2025-01-14 NOTE — TELEPHONE ENCOUNTER
No care due was identified.  [facility] Embedded Care Due Messages. Reference number: [id number].   1/14/2025 2:31:54 PM CST

## 2025-01-16 RX ORDER — TIRZEPATIDE 7.5 MG/.5ML
7.5 INJECTION, SOLUTION SUBCUTANEOUS
Qty: 2 ML | Refills: 0 | Status: SHIPPED | OUTPATIENT
Start: 2025-01-16

## 2025-01-16 NOTE — TELEPHONE ENCOUNTER
Please ask his mom (Alis) if Seun had any stomach pain or diarrhea.     If tolerated, will increase Mounjaro to 7.5mg weekly.

## 2025-01-31 ENCOUNTER — PATIENT MESSAGE (OUTPATIENT)
Dept: INTERNAL MEDICINE | Facility: CLINIC | Age: 35
End: 2025-01-31
Payer: MEDICAID

## 2025-02-11 ENCOUNTER — TELEPHONE (OUTPATIENT)
Dept: INTERNAL MEDICINE | Facility: CLINIC | Age: 35
End: 2025-02-11
Payer: MEDICAID

## 2025-02-11 NOTE — TELEPHONE ENCOUNTER
----- Message from Mounika sent at 2/11/2025 10:56 AM CST -----  Contact: [phone]@patient  Requesting an RX refill or new RX.tirzepatide (MOUNJARO) 7.5 mg/0.5 mL PnIj    Is this a refill or new RX: refill (Patient needs PA)    RX name and strength (copy/paste from chart):      Is this a 30 day or 90 day RX:     Pharmacy name and phone # [id number], [place] - [address] [place] [address]        The doctors have asked that we provide their patients with the following 2 reminders -- prescription refills can take up to 72 hours, and a friendly reminder that in the future you can use your [facility] account to request refills:

## 2025-02-11 NOTE — TELEPHONE ENCOUNTER
Care Due:                  Date            Visit Type   Department     Provider  --------------------------------------------------------------------------------                                EP -                              PRIMARY      MET INTERNAL  Last Visit: 12-      CARE ([facility])   MARY Arce                              EP -                              PRIMARY      MET INTERNAL  Next Visit: 05-      CARE ([facility])   MEDICINE       Karla Arce                                                            Last  Test          Frequency    Reason                     Performed    Due Date  --------------------------------------------------------------------------------    CMP.........  12 months..  rosuvastatin.............  05- 05-    Lipid Panel.  12 months..  rosuvastatin.............  05- 05-    [employer] Embedded Care Due Messages. Reference number: [id number].   2/11/2025 1:03:51 PM CST

## 2025-02-14 RX ORDER — TIRZEPATIDE 7.5 MG/.5ML
7.5 INJECTION, SOLUTION SUBCUTANEOUS
Qty: 2 ML | Refills: 0 | Status: SHIPPED | OUTPATIENT
Start: 2025-02-14

## 2025-02-14 NOTE — TELEPHONE ENCOUNTER
Refilled mounjaro--> has it been approved for DM2?    Has he tolerated it well? Does he indicate that he has abdominal pain? (Will need to ask his mother Alis)

## 2025-02-17 ENCOUNTER — PATIENT MESSAGE (OUTPATIENT)
Facility: CLINIC | Age: 35
End: 2025-02-17

## 2025-02-17 ENCOUNTER — OFFICE VISIT (OUTPATIENT)
Facility: CLINIC | Age: 35
End: 2025-02-17
Payer: MEDICAID

## 2025-02-17 DIAGNOSIS — F84.0 AUTISM SPECTRUM DISORDER: ICD-10-CM

## 2025-02-17 DIAGNOSIS — E11.69 TYPE 2 DIABETES MELLITUS WITH OBESITY: Primary | ICD-10-CM

## 2025-02-17 DIAGNOSIS — E66.9 TYPE 2 DIABETES MELLITUS WITH OBESITY: Primary | ICD-10-CM

## 2025-02-17 DIAGNOSIS — G43.709 CHRONIC MIGRAINE WITHOUT AURA WITHOUT STATUS MIGRAINOSUS, NOT INTRACTABLE: Primary | ICD-10-CM

## 2025-02-17 PROCEDURE — 1160F RVW MEDS BY RX/DR IN RCRD: CPT | Mod: CPTII,95,,

## 2025-02-17 PROCEDURE — 4010F ACE/ARB THERAPY RXD/TAKEN: CPT | Mod: CPTII,95,,

## 2025-02-17 PROCEDURE — 1159F MED LIST DOCD IN RCRD: CPT | Mod: CPTII,95,,

## 2025-02-17 NOTE — TELEPHONE ENCOUNTER
No care due was identified.  [facility] Embedded Care Due Messages. Reference number: [id number].   2/17/2025 9:33:24 AM CST

## 2025-02-17 NOTE — PROGRESS NOTES
"Established Patient   SUBJECTIVE:  Patient ID: Seun Wynne   Chief Complaint: No chief complaint on file.    History of Present Illness:  Seun Wynne is a 34 y.o. male who presents for follow-up of headaches via virtual visit.     The patient location is: [place]  The chief complaint leading to consultation is: headache     Visit type: audiovisual    Face to Face time with patient: 5  13 minutes of total time spent on the encounter, which includes face to face time and non-face to face time preparing to see the patient (eg, review of tests), Obtaining and/or reviewing separately obtained history, Documenting clinical information in the electronic or other health record, Independently interpreting results (not separately reported) and communicating results to the patient/family/caregiver, or Care coordination (not separately reported).     Each patient to whom he or she provides medical services by telemedicine is:  (1) informed of the relationship between the physician and patient and the respective role of any other health care provider with respect to management of the patient; and (2) notified that he or she may decline to receive medical services by telemedicine and may withdraw from such care at any time.      02/17/2025 - Interval History:  Still doing topamax 125mg and tylenol prn. Does not feel like he is taking tylenol >15days per month. Reports headaches are "the same" but denies wanting to add additional preventative at this time. Patient responding first to all questions today.     11/22/2024 - Interval History:  Higher topamax seems to be working. Pt denies feeling any negative effects of medication. Nods his head in agreement that he "thinks" headaches are better. Mother would like to stick with same regimen at this time and consider CGRP/botox in the future. Pt takes ozempic currently, so would be open to injectables. Pt nods head in agreement with plan. Mother feels like he is " taking less tylenol. She doesn't always see when he takes it but feels certain that its less than 15x/month. Does not want additional rescue medication.   Mother reports still taking propranolol as prescribed by pcp.   Plan:  - preventative: continue topamax 125mg/night. Can consider cgrp/botox in the future if additional medication required.   - rescue: continue tylenol prn. Discussed avoiding rebound headache by limiting the use of all over-the-counter medications to less than 15 days/month.         Recommendations made at last Office Visit on 8/16/24:  - prevention: increase topamax to 125mg daily. May increase dose until headaches more well controlled. Continue propranolol as prescribed by pcp. Pt will notify me if pcp would like me to take over this prescription in the future.   - rescue: may continue tylenol prn   - Discussed avoiding rebound headache by limited tylenol to less than 15days per month.   - Start tracking migraines - headache diary given with example provided.     History of Present Illness:   33 y.o. male with chronic migraines, Asperger syndrome, social anxiety, HTN, DM2 (controlled w/ ozempic), HLD, alpha thalassemia, fatty liver disease, heartburn, who presents to clinic with mother for evaluation of headaches.      Patient participated in appointment and was able to give some descriptors of his headaches, however, mother was primary historian during appointment.      Patient is a previous patient of Dr. Hampton, transferring care to me for treatment of chronic migraines. Current regimen includes: topamax 100mg     Pt also prescribed the following meds by pcp: lexapro 20mg, losartan 25m, Propranolol 60mg, zoloft 100mg, wellbutrin 75mg     Patient mother reports giving him tylenol 2-4x/month with severe headache but mother reports patient may be taking it more often without her knowledge. Pt unable to state how often he is having headaches but feels like his medication could be working better.          PMHx negative for TBI, Meningitis, Aneurysms, Kidney Stones, asthma, GI bleed, osteoporosis, CAD/MI, CVA/TIA, cancer      Family Hx negative for Migraines      Headache History:  Onset - 30yo  Previous Hx of HA -   Location/Radiation - bilateral frontalis   Quality - pulsing, sometimes sharp   Duration - can last a whole day   Intensity (range) - moderate to severe pain   Frequency - unable to determine; however when questioned, feels like they could be better; 0/30 are debilitating  Triggers - none identified  Aggravating Factors - sounds  Alleviating Factors - laying down, rest, quiet  Recent Changes - no  Prodrome/Aura - no  HA today? - no  Time of day of most headaches- anytime  Sleep - no snoring, wakes feeling refreshed, resolution of headache with sleep     Associated symptoms with the headache:   Meningeal symptoms - photophobia, phonophobia, exercise intolerance   Nausea/vomitting  Nasal drainage   Visual blurriness   Pallor/flushing  Dizziness   Vertigo  Confusion  Impaired concentration   Pain worsened with physical activity   Neck pain      Treatments Tried   Tylenol   Topamax 125mg   Propranolol 60mg   Losartan   Wellbutrin   Zoloft   Zofran     Current Medications:  Current Medications[1]    Review of Systems - A review of 10+ systems was conducted with pertinent positive and negative findings documented in HPI with all other systems reviewed and negative.    PFSH: Past medical, family, and social history reviewed as documented in chart with pertinent positive medical, family, and social history detailed in HPI.    OBJECTIVE:  Vitals: There were no vitals taken for this visit.     Physical Exam:  Constitutional: he appears well-developed and well-nourished. he is well groomed. NAD.    Review of Data:   Notes from pcp reviewed   Labs:  Lab Visit on 12/03/2024   Component Date Value Ref Range Status    Hemoglobin A1C 12/03/2024 5.9 (H)  4.0 - 5.6 % Final    Estimated Avg Glucose 12/03/2024 123   68 - 131 mg/dL Final    WBC 12/03/2024 6.30  3.90 - 12.70 K/uL Final    RBC 12/03/2024 5.20  4.60 - 6.20 M/uL Final    Hemoglobin 12/03/2024 11.3 (L)  14.0 - 18.0 g/dL Final    Hematocrit 12/03/2024 39.0 (L)  40.0 - 54.0 % Final    MCV 12/03/2024 75 (L)  82 - 98 fL Final    MCH 12/03/2024 21.7 (L)  27.0 - 31.0 pg Final    MCHC 12/03/2024 29.0 (L)  32.0 - 36.0 g/dL Final    RDW 12/03/2024 21.2 (H)  11.5 - 14.5 % Final    Platelets 12/03/2024 279  150 - 450 K/uL Final    MPV 12/03/2024 10.7  9.2 - 12.9 fL Final    Immature Granulocytes 12/03/2024 1.0 (H)  0.0 - 0.5 % Final    Gran # (ANC) 12/03/2024 3.4  1.8 - 7.7 K/uL Final    Immature Grans (Abs) 12/03/2024 0.06 (H)  0.00 - 0.04 K/uL Final    Lymph # 12/03/2024 2.1  1.0 - 4.8 K/uL Final    Mono # 12/03/2024 0.5  0.3 - 1.0 K/uL Final    Eos # 12/03/2024 0.2  0.0 - 0.5 K/uL Final    Baso # 12/03/2024 0.04  0.00 - 0.20 K/uL Final    nRBC 12/03/2024 0  0 /100 WBC Final    Gran % 12/03/2024 53.7  38.0 - 73.0 % Final    Lymph % 12/03/2024 32.9  18.0 - 48.0 % Final    Mono % 12/03/2024 8.6  4.0 - 15.0 % Final    Eosinophil % 12/03/2024 3.2  0.0 - 8.0 % Final    Basophil % 12/03/2024 0.6  0.0 - 1.9 % Final    Differential Method 12/03/2024 Automated   Final    Sodium 12/03/2024 140  136 - 145 mmol/L Final    Potassium 12/03/2024 4.0  3.5 - 5.1 mmol/L Final    Chloride 12/03/2024 108  95 - 110 mmol/L Final    CO2 12/03/2024 22 (L)  23 - 29 mmol/L Final    Glucose 12/03/2024 85  70 - 110 mg/dL Final    BUN 12/03/2024 11  6 - 20 mg/dL Final    Creatinine 12/03/2024 1.0  0.5 - 1.4 mg/dL Final    Calcium 12/03/2024 10.0  8.7 - 10.5 mg/dL Final    Anion Gap 12/03/2024 10  8 - 16 mmol/L Final    eGFR 12/03/2024 >60.0  >60 mL/min/1.73 m^2 Final     Imaging:  No results found for this or any previous visit.  Note: I have independently reviewed any/all imaging/labs/tests and agree with the report (s) as documented.  Any discrepancies will be as noted/demarcated by free text.  MBS,  FNP-C 2/17/2025    ASSESSMENT:  1. Chronic migraine without aura without status migrainosus, not intractable    2. Autism spectrum disorder            PLAN:  - preventative: continue topamax 125mg nightly. Pt and mother report headaches well controlled and not interested in adding additional preventative at this time. Discussed emgality if needed in the future.   - rescue: continue to use tylenol as needed. Reminded patient and mother to limit to less than 15x/month which they feel is true at this time.    - Continue tracking headaches   - Discussed goals of therapy are to decrease the frequency, intensity, and duration of headaches  - Risks, benefits, and potential side effects of topamax discussed  - Alternative treatment options offered   - RTC in 4-6 months or sooner if needed         Patient Education:  In today's clinic visit we provided patient with education on their headache diagnosis, pathophysiology, triggers, aggravating and improving factors, risk factors for chronification of migraine, preventive management, non-pharmacological management and proper use of acute management medications.   Patient expressed understanding and all questions were answered.  Lifestyle modifications including healthy regular eating, avoiding dehydration, avoiding more than 1 caffeinated product a day, establishing routing sleep patterns at least 8 hours of sleep and avoiding oversleeping and working on relaxation and stressors.       CC: Karla Arec MD Monique Smith, FNP-C  [facility]   [phone]    Dr. Lux was available during today's encounter.            [1]   Current Outpatient Medications:     buPROPion (WELLBUTRIN SR) 200 MG SR12, Take 200 mg by mouth every morning., Disp: , Rfl:     cholecalciferol, vitamin D3, (VITAMIN D3) 50 mcg (2,000 unit) Cap, Take 1 capsule by mouth once daily., Disp: , Rfl:     losartan (COZAAR) 25 MG tablet, Take 1 tablet (25 mg total) by mouth once  daily., Disp: 90 tablet, Rfl: 3    multivitamin capsule, Take 1 capsule by mouth once daily., Disp: , Rfl:     ondansetron (ZOFRAN-ODT) 8 MG TbDL, Take 1 tablet (8 mg total) by mouth every 12 (twelve) hours as needed (nausea or vomiting)., Disp: 30 tablet, Rfl: 0    pantoprazole (PROTONIX) 40 MG tablet, Take 1 tablet (40 mg total) by mouth once daily., Disp: 90 tablet, Rfl: 3    propranoloL (INDERAL LA) 60 MG 24 hr capsule, Take 1 capsule (60 mg total) by mouth once daily., Disp: 90 capsule, Rfl: 3    rosuvastatin (CRESTOR) 40 MG Tab, Take 1 tablet by mouth once daily, Disp: 90 tablet, Rfl: 3    sertraline (ZOLOFT) 100 MG tablet, Take 100 mg by mouth., Disp: , Rfl:     tirzepatide (MOUNJARO) 7.5 mg/0.5 mL PnIj, Inject 7.5 mg into the skin every 7 days., Disp: 2 mL, Rfl: 0    topiramate (TOPAMAX) 100 MG tablet, Take 1 tablet (100 mg total) by mouth every evening., Disp: 30 tablet, Rfl: 11    topiramate (TOPAMAX) 25 MG tablet, Take 1 tablet (25 mg total) by mouth once daily., Disp: 30 tablet, Rfl: 11

## 2025-02-18 ENCOUNTER — PATIENT MESSAGE (OUTPATIENT)
Facility: CLINIC | Age: 35
End: 2025-02-18
Payer: MEDICAID

## 2025-02-18 RX ORDER — TIRZEPATIDE 7.5 MG/.5ML
7.5 INJECTION, SOLUTION SUBCUTANEOUS WEEKLY
Qty: 2 ML | Refills: 1 | Status: SHIPPED | OUTPATIENT
Start: 2025-02-18

## 2025-02-18 NOTE — TELEPHONE ENCOUNTER
Refill Routing Note   Medication(s) are not appropriate for processing by [facility] for the following reason(s):        Clarification of medication (Rx) details  New or recently adjusted medication    [facility] action(s):  Defer        Medication Therapy Plan: Pharmacy comment: Please provide the diagnosis indicated for this prescription.      Appointments  past 12m or future 3m with PCP    Date Provider   Last Visit   12/3/2024 Karla Arce MD   Next Visit   5/7/2025 Karla Arce MD   ED visits in past 90 days: 0        Note composed:9:18 PM 02/17/2025

## 2025-03-26 ENCOUNTER — OFFICE VISIT (OUTPATIENT)
Dept: HEPATOLOGY | Facility: CLINIC | Age: 35
End: 2025-03-26
Payer: MEDICAID

## 2025-03-26 ENCOUNTER — PROCEDURE VISIT (OUTPATIENT)
Dept: HEPATOLOGY | Facility: CLINIC | Age: 35
End: 2025-03-26
Payer: MEDICAID

## 2025-03-26 VITALS — HEIGHT: 76 IN | BODY MASS INDEX: 38.36 KG/M2 | WEIGHT: 315 LBS

## 2025-03-26 DIAGNOSIS — K76.0 METABOLIC DYSFUNCTION-ASSOCIATED STEATOTIC LIVER DISEASE (MASLD): Primary | ICD-10-CM

## 2025-03-26 DIAGNOSIS — E11.69 TYPE 2 DIABETES MELLITUS WITH OBESITY: ICD-10-CM

## 2025-03-26 DIAGNOSIS — K76.0 FATTY LIVER: ICD-10-CM

## 2025-03-26 DIAGNOSIS — K74.01 HEPATIC FIBROSIS, EARLY FIBROSIS: ICD-10-CM

## 2025-03-26 DIAGNOSIS — E66.9 TYPE 2 DIABETES MELLITUS WITH OBESITY: ICD-10-CM

## 2025-03-26 DIAGNOSIS — E78.2 MIXED HYPERLIPIDEMIA: ICD-10-CM

## 2025-03-26 DIAGNOSIS — E66.813 CLASS 3 SEVERE OBESITY WITH SERIOUS COMORBIDITY AND BODY MASS INDEX (BMI) OF 40.0 TO 44.9 IN ADULT, UNSPECIFIED OBESITY TYPE: ICD-10-CM

## 2025-03-26 DIAGNOSIS — E66.01 CLASS 3 SEVERE OBESITY WITH SERIOUS COMORBIDITY AND BODY MASS INDEX (BMI) OF 40.0 TO 44.9 IN ADULT, UNSPECIFIED OBESITY TYPE: ICD-10-CM

## 2025-03-26 DIAGNOSIS — R74.8 ELEVATED LIVER ENZYMES: ICD-10-CM

## 2025-03-26 PROCEDURE — 99999 PR PBB SHADOW E&M-EST. PATIENT-LVL III: CPT | Mod: PBBFAC,,,

## 2025-03-26 PROCEDURE — 99214 OFFICE O/P EST MOD 30 MIN: CPT | Mod: S$PBB,,,

## 2025-03-26 PROCEDURE — 91200 LIVER ELASTOGRAPHY: CPT | Mod: PBBFAC

## 2025-03-26 PROCEDURE — 99213 OFFICE O/P EST LOW 20 MIN: CPT | Mod: PBBFAC,25

## 2025-03-26 PROCEDURE — 4010F ACE/ARB THERAPY RXD/TAKEN: CPT | Mod: CPTII,,,

## 2025-03-26 PROCEDURE — 3008F BODY MASS INDEX DOCD: CPT | Mod: CPTII,,,

## 2025-03-26 PROCEDURE — 91200 LIVER ELASTOGRAPHY: CPT | Mod: 26,S$PBB,,

## 2025-03-26 PROCEDURE — 1160F RVW MEDS BY RX/DR IN RCRD: CPT | Mod: CPTII,,,

## 2025-03-26 PROCEDURE — 1159F MED LIST DOCD IN RCRD: CPT | Mod: CPTII,,,

## 2025-03-26 RX ORDER — ARIPIPRAZOLE 5 MG/1
5 TABLET ORAL
COMMUNITY
Start: 2025-03-13

## 2025-03-26 NOTE — PROCEDURES
FibroScan Transplant Hepatology(Vibration Controlled Transient Elastography)    Date/Time: 3/26/2025 1:15 PM    Performed by: Leyla Schmidt NP  Authorized by: Sara Bunch NP    Diagnosis:  NAFLD    Probe:  XL    Universal Protocol: Patient's identity, procedure and site were verified, confirmatory pause was performed.  Discussed procedure including risks and potential complications.  Questions answered.  Patient verbalizes understanding and wishes to proceed with VCTE.     Procedure: After providing explanations of the procedure, patient was placed in the supine position with right arm in maximum abduction to allow optimal exposure of right lateral abdomen.  Patient was briefly assessed, Testing was performed in the mid-axillary location, 50Hz Shear Wave pulses were applied and the resulting Shear Wave and Propagation Speed detected with a 3.5 MHz ultrasonic signal, using the FibroScan probe, Skin to liver capsule distance and liver parenchyma were accessed during the entire examination with the FibroScan probe, Patient was instructed to breathe normally and to abstain from sudden movements during the procedure, allowing for random measurements of liver stiffness. At least 10 Shear Waves were produced, Individual measurements of each Shear Wave were calculated.  Patient tolerated the procedure well with no complications.  Meets discharge criteria as was dismissed.  Rates pain 0 out of 10.  Patient will follow up with ordering provider to review results.    Findings  Median liver stiffness score:  8  CAP Reading: dB/m:  276    IQR/med %:  13  Interpretation  Fibrosis interpretation is based on medial liver stiffness - Kilopascal (kPa).    Fibrosis Stage:  F2  Steatosis interpretation is based on controlled attenuation parameter - (dB/m).    Steatosis Grade:  S2

## 2025-03-26 NOTE — PROGRESS NOTES
[facility] - New Patient    PCP: Karla Arce MD     Chief Complaint:  fatty liver     Last saw Sara Bunch, NP 3/2024     HISTORY       HPI: This is a 34 y.o. patient with PMH noted below, presenting for evaluation of  fatty liver disease.     Fatty liver first noted on abd US 9/6/22. Liver enlarged at 23 cm. No findings to suggest advanced liver fibrosis.      His transaminases have been intermittently elevated since 2019, ALT>AST, <100.     Previous serologic w/u negative for Albino's, alpha-1 antitrypsin deficiency, hemochromatosis, autoimmune etiology, and viral hepatitis.    Prior serologic workup:   Lab Results   Component Value Date    SMOOTHMUSCAB Negative 1:40 09/06/2022    IGGSERUM 1007 09/06/2022    ANASCREEN Negative <1:80 09/06/2022    FERRITIN 510 (H) 07/28/2021    FESATURATED 23 07/28/2021    D1KQUUTBKC 105 09/06/2022    CERULOPLSM 26.0 09/06/2022    HEPBSAG Negative 03/05/2020    HEPCAB Negative 03/05/2020    HEPAIGM Negative 03/05/2020         Interval HPI: Presents today with mother. No new concerns or complaints. States that he recently started mounjaro for his T2DM. Was previously on ozempic, but was not having success with weight loss so was switched. Has been on it for about 1 week, currently 333#. Would like to see if mounjaro will help with weight loss before starting additional medication.    Diet: whatever he feels like, snacks  Exercise: none  Supplements: MVI    Risk factors for fatty liver include:  Obesity - current BMI 40.55  HTN - managed with medication  HLD - rosuvastatin 40 mg  T2DM - last A1C 5.9 mounjaro      LABS & DIAGNOSTIC STUDIES        Labs done 5/2024 show elevated transaminase levels (ALT > AST, elevated since 11/2019)  Platelets wnl, alk phos wnl  Synthetic liver functioning wnl    Lab Results   Component Value Date    ALT 73 (H) 05/13/2024    AST 46 (H) 05/13/2024    ALKPHOS 39 (L) 05/13/2024    BILITOT 0.4 05/13/2024    ALBUMIN 4.4 05/13/2024  "    12/03/2024       Imaging:  Abd U/S done 3/2024 showed   FINDINGS:  Pancreas demonstrates no abnormality.  The gallbladder demonstrates no stone, wall thickening, or Graham sign, the bile duct measures 2.1 mm.  Liver measures 21.1 cm, the spleen 9.1 cm.  No focal liver or spleen lesions are seen.  No bile duct dilatation seen.     Impression:     No acute process seen.     Hepatomegaly.       Liver fibrosis staging:  -- Fibroscan 3/2025 noted S2, F2 (, kPa 8.0)  -- Fibroscan 3/2024 noted S3, F0-1 (, kPa 5.1)  -- Fibroscan 9/2022 noted S3, F2 (, kPa 7.7)       No Alcohol consumption, see below  Social History     Substance and Sexual Activity   Alcohol Use No       Immunity to Hep A and B - recommend vaccination     Denies family history of liver disease.         Allergy and medication list reviewed and updated     PMHX:  has a past medical history of Asperger syndrome, Diabetes mellitus, type 2, Hyperlipidemia, Hypertension, and Obesity.    PSHX:  has a past surgical history that includes Esophagogastroduodenoscopy (N/A, 6/19/2018).    FAMILY HISTORY: Updated and reviewed in EPIC    ROS:   GENERAL: Denies fatigue  CARDIOVASCULAR: Denies edema  GI: Denies abdominal pain  SKIN: Denies rash, itching   NEURO: Denies confusion, memory loss, or mood changes    PHYSICAL EXAM:   In no acute distress; alert and oriented to person, place and time  VITALS: Ht 6' 4" (1.93 m)   Wt (!) 151.1 kg (333 lb 1.8 oz)   BMI 40.55 kg/m²   EYES: Sclerae anicteric  GI: Soft, non-tender, non-distended. No ascites.  EXTREMITIES:  No edema.  SKIN: Warm and dry. No jaundice. No telangectasias noted. No palmar erythema.  NEURO:  No asterixis.  PSYCH:  Thought and speech pattern appropriate. Behavior normal        ASSESSMENT & PLAN        EDUCATION:  See instructions discussed with patient in Instructions section of the After Visit Summary     ASSESSMENT & PLAN:  34 y.o. male with:   1.  Fatty liver, likely " related to metabolic risk factors Obesity, HTN, HLD, T2DM   - see HPI  -- Immunity to Hep A and B : see HPI  -- Fibroscan 3/2025 noted severe steatosis and stage 2 fibrosis  -- will repeat in 6 months to reassess and consider starting rezdiffra if fibrosis is still present  -- Recommendations discussed with patient:  Continue no alcohol use  2 Weight loss goal of 35-45 lbs  3. Low carb/sugar, high fiber and protein diet, limit your carb intake to LESS than 30-45 grams of carbs with a meal or LESS than 5-10 grams with any snack   4. Exercise, 5 days per week, 30 minutes per day, as tolerated  5. Recommend good cholesterol, blood pressure, blood sugar levels   6. There is a new medication called Rezdiffra for the treatment of F2-F3 liver scarring due to fatty liver. It is only indicated for those with stage 2 or 3 scar tissue (will discuss starting at follow up)    2. Hepatic fibrosis, stage 2  -- fibroscan 3/2025 noted stage 2 fibrosis  -- likely due to MASH  -- will repeat fibroscan in 6 months to reassess and consider starting rezdiffra if fibrosis is still present  -- HCC surv with US and AFP annually - will complete if fibrosis still present on repeat fibroscan    3. Elevated liver enzymes  -- sero work up negative  -- likely from MASH  -- will continue to trend labs    4. Obesity, HTN, HLD, T2DM   -- Body mass index is 40.55 kg/m².   -- increases risk for fatty liver  -- recommend continued use of GLP-1 for DM with added weight loss benefit        Follow up in about 6 months (around 9/26/2025). with labs and fibroscan before  Orders Placed This Encounter   Procedures    FibroScan Transplant Hepatology(Vibration Controlled Transient Elastography)    Hepatic Function Panel        Thank you for allowing me to participate in the care of Seun Nunn TIFFANIE Matias, FNP-C  Nurse Practitioner  [facility]  [facility]     I spent a total of 30 minutes on the day of the  visit.This includes face to face time and non-face to face time preparing to see the patient (eg, review of tests), obtaining and/or reviewing separately obtained history, documenting clinical information in the electronic or other health record, independently interpreting results and communicating results to the patient/family/caregiver, and coordinating care.         CC'ed note to:   Karla Arce MD

## 2025-03-26 NOTE — PATIENT INSTRUCTIONS
1. Fibroscan to look for fat or scar tissue in the liver showed >34% fat in the liver  2. Recommend vaccines for Hepatitis  A and B if you have not completed them in the past, see below   3. Follow up in 6 months with labs and fibroscan before    FATTY LIVER:  These things are important to improve fatty liver:  No alcohol use  2 Weight loss goal of 35-45 lbs. ok to use weight loss medications to help with weight loss from a liver standpoint if you don't have any other contraindications   3. [facility] offers benefits to patients so let me know if you want a referral to the [facility] gym. Also, [facility] has dieticians that can create a weight loss plan. If you are interested let me know and I can place a referral. If so, contact the dietician team at [facility] at email [email or web address] or call [phone].  to get scheduled. They do offer video visits   4. Avoid processed foods. No fried/fast foods. No sugary drinks or drinks with any calories.   5. Low carb/sugar and high protein diet (100 grams per day of protein).Try to limit your carb intake to LESS than  grams per day total. Use MyFitness Pal or Lose It jason to add up your carbs through the day. Do NOT drink any beverages with calories or carbs (this can lead to high blood sugar and weight gain). The main thing to focus on is high protein, low carb)  6. Exercise, 5 days per week, 30 minutes per day, as tolerated  7. Recommend good cholesterol, blood pressure, blood sugar levels   8. There is a new medication called Rezdiffra for the treatment of F2-F3 liver scarring due to fatty liver. It is only indicated for patients with significant scar tissue from fatty liver (would consider - will discuss after repeat fibroscan)    In some people, fatty liver can progress to steatohepatitis (inflamatory fatty liver) and possibly to cirrhosis, increasing the risk for liver cancer, liver failure, and death.  Therefore, the lifestyle changes are very important to decrease this risk.     Helpful website for MAS/fatty liver: https://[email or web address].[employer].com.com/patient-resources/      HEP A/B VACCINE  The CDC recommends that all adults complete the combination Hepatitis A and B vaccine called TwinRix. This will protect your liver from these viruses, which can make your liver very sick.     Hep A can be transmitted through food and water and can cause significant liver injury. There was previously a significant Hepatitis A outbreak in [place]. Hep B vaccine is transmitted through blood or bodily fluids (there are no symptoms typically) and can develop longstanding (chronic) Hep B and there is no cure, so many people have it for life. Therefore, the vaccines provide immunity against these viruses that can cause harm to the liver.     The vaccine series is 3 vaccines: one now, one at 4 weeks and one 6 months after the 1st one. You can get it from any pharmacy but any [facility] pharmacy typically stocks it and administers it frequently      If the vaccine is not covered at the pharmacy level with your insurance, you may be able to get it with your PCP or in the infectious disease department at [facility] or from your [facility].

## 2025-04-02 PROBLEM — R74.8 ELEVATED LIVER ENZYMES: Status: ACTIVE | Noted: 2025-04-02

## 2025-04-02 PROBLEM — K74.01 HEPATIC FIBROSIS, EARLY FIBROSIS: Status: ACTIVE | Noted: 2025-04-02

## 2025-04-10 ENCOUNTER — PATIENT OUTREACH (OUTPATIENT)
Dept: ADMINISTRATIVE | Facility: HOSPITAL | Age: 35
End: 2025-04-10
Payer: MEDICAID

## 2025-04-15 DIAGNOSIS — E66.9 TYPE 2 DIABETES MELLITUS WITH OBESITY: ICD-10-CM

## 2025-04-15 DIAGNOSIS — E11.69 TYPE 2 DIABETES MELLITUS WITH OBESITY: ICD-10-CM

## 2025-04-15 NOTE — TELEPHONE ENCOUNTER
Refill Routing Note   Medication(s) are not appropriate for processing by [facility] for the following reason(s):        New or recently adjusted medication    [facility] action(s):  Defer     Requires labs : Yes             Appointments  past 12m or future 3m with PCP    Date Provider   Last Visit   12/3/2024 Karla Arce MD   Next Visit   5/7/2025 Karla Arce MD   ED visits in past 90 days: 0        Note composed:12:19 PM 04/15/2025

## 2025-04-15 NOTE — TELEPHONE ENCOUNTER
Care Due:                  Date            Visit Type   Department     Provider  --------------------------------------------------------------------------------                                EP -                              PRIMARY      MET INTERNAL  Last Visit: 12-      CARE ([facility])   MEDICINE       Karla Arce                              EP -                              PRIMARY      MET INTERNAL  Next Visit: 05-      CARE ([facility])   MEDICINE       Karla Arce                                                            Last  Test          Frequency    Reason                     Performed    Due Date  --------------------------------------------------------------------------------    CMP.........  12 months..  rosuvastatin.............  05- 05-    HBA1C.......  6 months...  tirzepatide..............  12- 06-    Lipid Panel.  12 months..  rosuvastatin.............  05- 05-    Health [facility] Embedded Care Due Messages. Reference number: [id number].   4/15/2025 12:03:20 PM CDT

## 2025-04-17 RX ORDER — TIRZEPATIDE 5 MG/.5ML
5 INJECTION, SOLUTION SUBCUTANEOUS WEEKLY
Qty: 4 ML | Refills: 1 | Status: SHIPPED | OUTPATIENT
Start: 2025-04-17

## 2025-05-01 ENCOUNTER — TELEPHONE (OUTPATIENT)
Dept: INTERNAL MEDICINE | Facility: CLINIC | Age: 35
End: 2025-05-01
Payer: MEDICAID

## 2025-05-07 ENCOUNTER — PATIENT MESSAGE (OUTPATIENT)
Dept: INTERNAL MEDICINE | Facility: CLINIC | Age: 35
End: 2025-05-07
Payer: MEDICAID

## 2025-05-07 ENCOUNTER — OFFICE VISIT (OUTPATIENT)
Dept: INTERNAL MEDICINE | Facility: CLINIC | Age: 35
End: 2025-05-07
Payer: MEDICAID

## 2025-05-07 VITALS
BODY MASS INDEX: 38.36 KG/M2 | TEMPERATURE: 98 F | WEIGHT: 315 LBS | SYSTOLIC BLOOD PRESSURE: 138 MMHG | OXYGEN SATURATION: 99 % | RESPIRATION RATE: 18 BRPM | DIASTOLIC BLOOD PRESSURE: 104 MMHG | HEART RATE: 75 BPM | HEIGHT: 76 IN

## 2025-05-07 DIAGNOSIS — E11.69 HYPERLIPIDEMIA ASSOCIATED WITH TYPE 2 DIABETES MELLITUS: ICD-10-CM

## 2025-05-07 DIAGNOSIS — I15.2 HYPERTENSION ASSOCIATED WITH DIABETES: ICD-10-CM

## 2025-05-07 DIAGNOSIS — R12 HEARTBURN: ICD-10-CM

## 2025-05-07 DIAGNOSIS — E66.9 TYPE 2 DIABETES MELLITUS WITH OBESITY: Primary | ICD-10-CM

## 2025-05-07 DIAGNOSIS — E78.5 HYPERLIPIDEMIA ASSOCIATED WITH TYPE 2 DIABETES MELLITUS: ICD-10-CM

## 2025-05-07 DIAGNOSIS — Z00.00 ANNUAL PHYSICAL EXAM: ICD-10-CM

## 2025-05-07 DIAGNOSIS — E11.59 HYPERTENSION ASSOCIATED WITH DIABETES: ICD-10-CM

## 2025-05-07 DIAGNOSIS — E11.69 TYPE 2 DIABETES MELLITUS WITH OBESITY: Primary | ICD-10-CM

## 2025-05-07 DIAGNOSIS — E66.01 SEVERE OBESITY (BMI 35.0-39.9) WITH COMORBIDITY: ICD-10-CM

## 2025-05-07 DIAGNOSIS — K76.0 METABOLIC DYSFUNCTION-ASSOCIATED STEATOTIC LIVER DISEASE (MASLD): ICD-10-CM

## 2025-05-07 PROCEDURE — 99999 PR PBB SHADOW E&M-EST. PATIENT-LVL IV: CPT | Mod: PBBFAC,,, | Performed by: INTERNAL MEDICINE

## 2025-05-07 PROCEDURE — 99214 OFFICE O/P EST MOD 30 MIN: CPT | Mod: PBBFAC,PO | Performed by: INTERNAL MEDICINE

## 2025-05-07 RX ORDER — ROSUVASTATIN CALCIUM 40 MG/1
TABLET, COATED ORAL
Qty: 90 TABLET | Refills: 3 | Status: SHIPPED | OUTPATIENT
Start: 2025-05-07

## 2025-05-07 RX ORDER — PANTOPRAZOLE SODIUM 40 MG/1
40 TABLET, DELAYED RELEASE ORAL DAILY
Qty: 90 TABLET | Refills: 3 | Status: SHIPPED | OUTPATIENT
Start: 2025-05-07

## 2025-05-07 RX ORDER — LOSARTAN POTASSIUM 50 MG/1
50 TABLET ORAL DAILY
Qty: 90 TABLET | Refills: 0 | Status: SHIPPED | OUTPATIENT
Start: 2025-05-07

## 2025-05-07 NOTE — PROGRESS NOTES
"    Subjective:     Seun Wynne is a 34 y.o. male who presents for an annual exam.    PCP: Karla Arce MD    Hypertension: The patient has been taking medications as directed. His mother checks his pill box periodically and has seen no problems. His psychiatrist recently changed his medication regimen and added Abilify and it may have contributed to changes in BP.    BP Readings from Last 3 Encounters:   05/07/25 (!) 138/104   12/03/24 112/84   11/22/24 118/81     Diabetes: Patient presents for follow up of diabetes. Current symptoms include: none.  Evaluation to date has included: hemoglobin A1C.   Current treatment: Mounjaro.     Lab Results   Component Value Date    HGBA1C 5.9 (H) 12/03/2024    GLU 85 12/03/2024     Hyperlipidemia: Compliance with treatment has been fair. TPatient denies muscle pain associated with his medications. Previous history of cardiac disease includes: none.    Lab Results   Component Value Date    CHOL 178 05/13/2024    HDL 44 05/13/2024        Medical History:   Past Medical History:   Diagnosis Date    Asperger syndrome     Diabetes mellitus, type 2     "under control after weight loss"    Hyperlipidemia     Hypertension     "BEEN FINE IN RECENT YRS NEVER TOOK MEDS"    Obesity        Family History: family history includes Cancer in his maternal aunt; Cataracts in his mother; Cirrhosis in his paternal grandmother; Diabetes in his maternal grandfather; Hypertension in his father, maternal grandfather, mother, and paternal grandfather.    Surgical History:   Past Surgical History:   Procedure Laterality Date    ESOPHAGOGASTRODUODENOSCOPY N/A 6/19/2018    Procedure: ESOPHAGOGASTRODUODENOSCOPY (EGD);  Surgeon: Darell Gautam Jr., MD;  Location: [facility];  Service: Endoscopy;  Laterality: N/A;        Social History:  reports that he has never smoked. He has never used smokeless tobacco. He reports that he does not drink alcohol and does not use drugs.     Allergies: " Review of patient's allergies indicates:  No Known Allergies    Medications: Current Medications[1]    Health Maintenance:   Health Maintenance Topics with due status: Not Due       Topic Last Completion Date    TETANUS VACCINE 01/24/2022    RSV Vaccine (Age 60+ and Pregnant patients) Not Due     Lab Results   Component Value Date    HEPCAB Negative 03/05/2020     Eye Exam: done with Dr. Kimmy Elena, will request records  Dental Exam: due  Hepatitis C screening: 3/2020, neg    Vaccinations:   Immunization History   Administered Date(s) Administered    COVID-19 MRNA, LN-S PF (MODERNA HALF 0.25 ML DOSE) 12/03/2021    COVID-19, MRNA, LN-S, PF (MODERNA FULL 0.5 ML DOSE) 03/18/2021, 03/18/2021, 04/17/2021, 04/17/2021, 12/03/2021    COVID-19, mRNA, LNP-S, bivalent booster, PF (Moderna Omicron)12 + YEARS 07/26/2023, 07/26/2023    Influenza - Quadrivalent - PF *Preferred* (6 months and older) 11/11/2020, 12/22/2021, 09/27/2023    Influenza - Trivalent - Fluarix, Flulaval, Fluzone, Afluria - PF 12/03/2024    Pneumococcal Conjugate - 20 Valent 05/23/2024    Td (ADULT) 01/24/2022    Td - PF (ADULT) 01/24/2022, 01/24/2022    Tdap 10/28/2010     Influenza: done  Tetanus: 2022  Prevnar-20:2024  Covid vaccine: not boosted      Body mass index is 39.72 kg/m².  Wt Readings from Last 3 Encounters:   05/07/25 (!) 148 kg (326 lb 4.5 oz)   03/26/25 (!) 151.1 kg (333 lb 1.8 oz)   12/03/24 (!) 145.5 kg (320 lb 12.3 oz)       Review of Systems   Unable to perform ROS: Other   Constitutional:         His mother notices drowsiness after taking Abilify in the morning.   Cardiovascular:  Negative for chest pain.   Gastrointestinal:  Negative for abdominal pain.   Neurological:  Negative for headaches.          Objective:     Physical Exam  Vitals reviewed.   Constitutional:       General: He is awake. He is not in acute distress.     Appearance: Normal appearance. He is well-developed and well-groomed. He is not ill-appearing.   HENT:       Head: Normocephalic and atraumatic.      Right Ear: Hearing, tympanic membrane, ear canal and external ear normal. Tympanic membrane is not erythematous or bulging.      Left Ear: Hearing, tympanic membrane, ear canal and external ear normal. Tympanic membrane is not erythematous or bulging.      Nose: Nose normal. No congestion.      Mouth/Throat:      Mouth: Mucous membranes are moist.      Tongue: No lesions.      Pharynx: Oropharynx is clear. Uvula midline. No oropharyngeal exudate or posterior oropharyngeal erythema.   Eyes:      General: Lids are normal. No scleral icterus.     Conjunctiva/sclera: Conjunctivae normal.      Right eye: Right conjunctiva is not injected.      Left eye: Left conjunctiva is not injected.      Pupils: Pupils are equal, round, and reactive to light.   Neck:      Thyroid: No thyroid mass or thyromegaly.   Cardiovascular:      Rate and Rhythm: Normal rate and regular rhythm.      Pulses: Normal pulses.      Heart sounds: Normal heart sounds. No murmur heard.  Pulmonary:      Effort: Pulmonary effort is normal. No respiratory distress.      Breath sounds: Normal breath sounds. No decreased breath sounds or wheezing.   Abdominal:      General: Bowel sounds are normal. There is no distension.      Palpations: Abdomen is soft.      Tenderness: There is no abdominal tenderness. There is no guarding or rebound.   Musculoskeletal:         General: Normal range of motion.      Cervical back: Neck supple.      Right lower leg: No edema.      Left lower leg: No edema.   Lymphadenopathy:      Cervical: No cervical adenopathy.      Upper Body:      Right upper body: No supraclavicular adenopathy.      Left upper body: No supraclavicular adenopathy.   Skin:     General: Skin is warm and dry.      Coloration: Skin is not cyanotic.      Findings: No lesion or rash.      Nails: There is no clubbing.   Neurological:      General: No focal deficit present.      Mental Status: He is lethargic.      Gait:  Gait is intact.      Deep Tendon Reflexes: Reflexes are normal and symmetric. Reflexes normal.   Psychiatric:         Attention and Perception: He is inattentive.         Mood and Affect: Affect is flat.         Speech: Speech is delayed.            Assessment:        1. Type 2 diabetes mellitus with obesity    2. Hyperlipidemia associated with type 2 diabetes mellitus    3. Hypertension associated with diabetes    4. Heartburn    5. Annual physical exam    6. Metabolic dysfunction-associated steatotic liver disease (MASLD)    7. Severe obesity (BMI 35.0-39.9) with comorbidity           Plan:     1. Type 2 diabetes mellitus with obesity  - roxanna HbA1c was 5.9, controlled, check recent labs  - continue Mounjaro    2. Hyperlipidemia associated with type 2 diabetes mellitus  - check lipid panel, continue Crestor, consider adding another agent if LDL remains higher than goal  - rosuvastatin (CRESTOR) 40 MG Tab; Take 1 tablet by mouth once daily  Dispense: 90 tablet; Refill: 3  - Lipid Panel; Future    3. Hypertension associated with diabetes  - BP elevated, may be related to starting Abilify  - increase losartan to 50mg daily and monitor BP closely  - losartan (COZAAR) 50 MG tablet; Take 1 tablet (50 mg total) by mouth once daily.  Dispense: 90 tablet; Refill: 0  - Comprehensive Metabolic Panel; Future    4. Heartburn  - may continue Protonix  -  pantoprazole (PROTONIX) 40 MG tablet; Take 1 tablet (40 mg total) by mouth once daily.  Dispense: 90 tablet; Refill: 3    5. Annual physical exam  - Microalbumin/Creatinine Ratio, Urine; Future  - Hemoglobin A1C; Future  - CBC Auto Differential; Future  - Comprehensive Metabolic Panel; Future  - Lipid Panel; Future  - TSH; Future  - Urinalysis; Future    6. Metabolic dysfunction-associated steatotic liver disease (MASLD)  - sees Hepatology for monitoring    7. Severe obesity (BMI 35.0-39.9) with comorbidity  - switched from semaglutide to tirzepatide, reports occasional episodes  of loose stool, monitoring     RTC in 3 months for follow-up or sooner if needed    __________________________    Karla Arce MD, PharmD  [facility]  American Board of Obesity Medicine diplomate  Office [phone]             [1]   Current Outpatient Medications:     ARIPiprazole (ABILIFY) 5 MG Tab, Take 5 mg by mouth., Disp: , Rfl:     buPROPion (WELLBUTRIN SR) 200 MG SR12, Take 200 mg by mouth every morning., Disp: , Rfl:     cholecalciferol, vitamin D3, (VITAMIN D3) 50 mcg (2,000 unit) Cap, Take 1 capsule by mouth once daily., Disp: , Rfl:     multivitamin capsule, Take 1 capsule by mouth once daily., Disp: , Rfl:     ondansetron (ZOFRAN-ODT) 8 MG TbDL, Take 1 tablet (8 mg total) by mouth every 12 (twelve) hours as needed (nausea or vomiting)., Disp: 30 tablet, Rfl: 0    propranoloL (INDERAL LA) 60 MG 24 hr capsule, Take 1 capsule (60 mg total) by mouth once daily., Disp: 90 capsule, Rfl: 3    sertraline (ZOLOFT) 100 MG tablet, Take 100 mg by mouth., Disp: , Rfl:     tirzepatide (MOUNJARO) 5 mg/0.5 mL PnIj, INJECT 1 SYRINGE SUBCUTANEOUSLY ONCE A WEEK, Disp: 4 mL, Rfl: 1    topiramate (TOPAMAX) 100 MG tablet, Take 1 tablet (100 mg total) by mouth every evening., Disp: 30 tablet, Rfl: 11    topiramate (TOPAMAX) 25 MG tablet, Take 1 tablet (25 mg total) by mouth once daily., Disp: 30 tablet, Rfl: 11    losartan (COZAAR) 50 MG tablet, Take 1 tablet (50 mg total) by mouth once daily., Disp: 90 tablet, Rfl: 0    pantoprazole (PROTONIX) 40 MG tablet, Take 1 tablet (40 mg total) by mouth once daily., Disp: 90 tablet, Rfl: 3    rosuvastatin (CRESTOR) 40 MG Tab, Take 1 tablet by mouth once daily, Disp: 90 tablet, Rfl: 3

## 2025-05-09 ENCOUNTER — APPOINTMENT (OUTPATIENT)
Dept: LAB | Facility: HOSPITAL | Age: 35
End: 2025-05-09
Attending: INTERNAL MEDICINE
Payer: MEDICAID

## 2025-05-29 ENCOUNTER — OFFICE VISIT (OUTPATIENT)
Dept: INTERNAL MEDICINE | Facility: CLINIC | Age: 35
End: 2025-05-29
Payer: MEDICAID

## 2025-05-29 VITALS
RESPIRATION RATE: 18 BRPM | OXYGEN SATURATION: 99 % | BODY MASS INDEX: 38.36 KG/M2 | DIASTOLIC BLOOD PRESSURE: 92 MMHG | SYSTOLIC BLOOD PRESSURE: 126 MMHG | HEART RATE: 63 BPM | TEMPERATURE: 98 F | HEIGHT: 76 IN | WEIGHT: 315 LBS

## 2025-05-29 DIAGNOSIS — I15.2 HYPERTENSION ASSOCIATED WITH DIABETES: Primary | ICD-10-CM

## 2025-05-29 DIAGNOSIS — E11.69 TYPE 2 DIABETES MELLITUS WITH OBESITY: ICD-10-CM

## 2025-05-29 DIAGNOSIS — E11.59 HYPERTENSION ASSOCIATED WITH DIABETES: Primary | ICD-10-CM

## 2025-05-29 DIAGNOSIS — E78.5 HYPERLIPIDEMIA ASSOCIATED WITH TYPE 2 DIABETES MELLITUS: ICD-10-CM

## 2025-05-29 DIAGNOSIS — E66.9 TYPE 2 DIABETES MELLITUS WITH OBESITY: ICD-10-CM

## 2025-05-29 DIAGNOSIS — E11.69 HYPERLIPIDEMIA ASSOCIATED WITH TYPE 2 DIABETES MELLITUS: ICD-10-CM

## 2025-05-29 PROCEDURE — 99214 OFFICE O/P EST MOD 30 MIN: CPT | Mod: PBBFAC,PO | Performed by: INTERNAL MEDICINE

## 2025-05-29 PROCEDURE — 99214 OFFICE O/P EST MOD 30 MIN: CPT | Mod: S$PBB,,, | Performed by: INTERNAL MEDICINE

## 2025-05-29 PROCEDURE — 3044F HG A1C LEVEL LT 7.0%: CPT | Mod: CPTII,,, | Performed by: INTERNAL MEDICINE

## 2025-05-29 PROCEDURE — 3066F NEPHROPATHY DOC TX: CPT | Mod: CPTII,,, | Performed by: INTERNAL MEDICINE

## 2025-05-29 PROCEDURE — 99999 PR PBB SHADOW E&M-EST. PATIENT-LVL IV: CPT | Mod: PBBFAC,,, | Performed by: INTERNAL MEDICINE

## 2025-05-29 PROCEDURE — 3061F NEG MICROALBUMINURIA REV: CPT | Mod: CPTII,,, | Performed by: INTERNAL MEDICINE

## 2025-05-29 PROCEDURE — 3008F BODY MASS INDEX DOCD: CPT | Mod: CPTII,,, | Performed by: INTERNAL MEDICINE

## 2025-05-29 PROCEDURE — 3080F DIAST BP >= 90 MM HG: CPT | Mod: CPTII,,, | Performed by: INTERNAL MEDICINE

## 2025-05-29 PROCEDURE — 3074F SYST BP LT 130 MM HG: CPT | Mod: CPTII,,, | Performed by: INTERNAL MEDICINE

## 2025-05-29 PROCEDURE — 4010F ACE/ARB THERAPY RXD/TAKEN: CPT | Mod: CPTII,,, | Performed by: INTERNAL MEDICINE

## 2025-05-29 PROCEDURE — 1159F MED LIST DOCD IN RCRD: CPT | Mod: CPTII,,, | Performed by: INTERNAL MEDICINE

## 2025-05-29 PROCEDURE — 1160F RVW MEDS BY RX/DR IN RCRD: CPT | Mod: CPTII,,, | Performed by: INTERNAL MEDICINE

## 2025-05-29 RX ORDER — LOSARTAN POTASSIUM 100 MG/1
100 TABLET ORAL DAILY
Qty: 90 TABLET | Refills: 3 | Status: SHIPPED | OUTPATIENT
Start: 2025-05-29

## 2025-05-29 NOTE — PROGRESS NOTES
Subjective:     Seun Wynne is a 34 y.o. male who presents for   Chief Complaint   Patient presents with    Follow-up    Diabetes    Hypertension    Hyperlipidemia       HPI    Seun presents today for follow up of hypertension, hyperlipidemia and diabetes.    The patient's mother Alis is present and she provides the history. Seun is currently taking Losartan 50mg and his daughter notices drowsiness after taking his morning medications. He has been seeing a Psychologist who added Abilify to his regimen and he takes it in the morning. He reports positive response to Mounjaro with significant weight improvement.    Triglycerides were previously elevated at 240, normalized in May 2024, but have since increased slightly to borderline elevated levels. Thyroid function tests were normal.    The patient's mother reports checking medication box to monitor use periodically. She believes that the patient is taking all of the prescribed medications.      Review of Systems   Unable to perform ROS: Other (mild intellectual disability)   Constitutional:  Positive for fatigue (excessive drowsiness in the morning). Negative for diaphoresis.   Psychiatric/Behavioral:  Positive for sleep disturbance. Negative for agitation.           Objective:     Physical Exam  Constitutional:       General: He is awake.      Appearance: Normal appearance. He is well-developed and well-groomed.   HENT:      Head: Normocephalic and atraumatic.      Right Ear: External ear normal.      Left Ear: External ear normal.      Nose: Nose normal. No congestion or rhinorrhea.      Mouth/Throat:      Mouth: Mucous membranes are moist.   Eyes:      General: Lids are normal. Gaze aligned appropriately.      Conjunctiva/sclera:      Right eye: No hemorrhage.     Left eye: No hemorrhage.     Pupils: Pupils are equal, round, and reactive to light.   Cardiovascular:      Rate and Rhythm: Normal rate and regular rhythm.      Heart sounds: No murmur  heard.  Pulmonary:      Effort: Pulmonary effort is normal. No respiratory distress.      Breath sounds: Normal breath sounds. No decreased breath sounds or wheezing.   Abdominal:      General: Bowel sounds are normal. There is no distension.   Musculoskeletal:         General: Normal range of motion.      Cervical back: Normal range of motion.      Right lower leg: No edema.      Left lower leg: No edema.   Skin:     General: Skin is warm and dry.   Neurological:      Mental Status: Mental status is at baseline. He is lethargic.   Psychiatric:         Attention and Perception: He is inattentive.         Mood and Affect: Affect is blunt.         Speech: Speech is delayed.            Assessment:      1. Hypertension associated with diabetes    2. Type 2 diabetes mellitus with obesity    3. Hyperlipidemia associated with type 2 diabetes mellitus           Plan:       Assessment & Plan    - Noted diastolic BP slightly elevated at 92 mmHg.  - Increased losartan from 50 mg to 100 mg daily to better control BP.  - Evaluated impact of Abilify on cardiovascular parameters, including cholesterol and BP. Recommend taking Abilify at night before sleep instead of in the morning.  - Reviewed recent lab results, noting normal thyroid function and improved but still elevated triglycerides.  - Discussed LDL management for diabetic patients, considering adding Zetia if lifestyle changes ineffective.  - Evaluated effectiveness of current Mounjaro dose for weight management. Increased Mounjaro dose from 5 mg to 7.5 mg.      ____________________________________    HYPERTENSION:   Blood pressure is 126/92, showing slight improvement but still requiring adjustment.   Increasing losartan from 50 mg to 100 mg daily to better control blood pressure.    TYPE 2 DIABETES MELLITUS:   Increasing Mounjaro dose from 5 mg to 7.5 mg weekly.   Ordered hemoglobin A1C, metabolic panel, and blood counts in 3 months (August).   Seun instructed to  contact the office about 1 week before the next Mounjaro refill to discuss continuing at current dose, increasing the dose, or reporting any side effects.   Explained importance of maintaining lower LDL levels (around 80 or less) for diabetic patients.    HYPERGLYCERIDEMIA:   Triglyceride levels have improved from previously elevated level of 240 to now close to borderline.   Will continue to monitor levels and consider adding Zetia to boost Crestor effects if levels remain high.   Discussed how fatty and sugary foods can impact triglyceride levels and recommended dietary changes to help lower both triglycerides and LDL cholesterol.    LIPOMA:   Small bump evaluated and confirmed as fatty tissue with no immediate concern noted.    OTHER RECOMMENDATIONS:   Recommend taking Abilify at night before sleep instead of in the morning.    FOLLOW-UP:   Follow up in 4 months with labs prior (cbc, bmp, HbA1c).         __________________________    Karla Arce MD, PharmD  [facility]  American Board of Obesity Medicine diplomate  Office [phone]

## 2025-06-18 DIAGNOSIS — E11.69 TYPE 2 DIABETES MELLITUS WITH OBESITY: ICD-10-CM

## 2025-06-18 DIAGNOSIS — E66.9 TYPE 2 DIABETES MELLITUS WITH OBESITY: ICD-10-CM

## 2025-06-18 NOTE — TELEPHONE ENCOUNTER
No care due was identified.  [facility] Embedded Care Due Messages. Reference number: [id number].   6/18/2025 2:43:20 PM CDT

## 2025-06-24 DIAGNOSIS — G43.709 CHRONIC MIGRAINE WITHOUT AURA WITHOUT STATUS MIGRAINOSUS, NOT INTRACTABLE: ICD-10-CM

## 2025-06-24 RX ORDER — PROPRANOLOL HYDROCHLORIDE 60 MG/1
60 CAPSULE, EXTENDED RELEASE ORAL DAILY
Qty: 90 CAPSULE | Refills: 3 | Status: SHIPPED | OUTPATIENT
Start: 2025-06-24

## 2025-06-24 NOTE — TELEPHONE ENCOUNTER
LOV 5/29/25  
No care due was identified.  Health [facility] Embedded Care Due Messages. Reference number: [id number].   6/24/2025 10:58:17 AM CDT  
Refill Routing Note   Medication(s) are not appropriate for processing by [facility] for the following reason(s):        Required vitals abnormal    [facility] action(s):  Defer             Appointments  past 12m or future 3m with PCP    Date Provider   Last Visit   5/29/2025 Karla Arce MD   Next Visit   10/1/2025 Karla Arce MD   ED visits in past 90 days: 0        Note composed:11:19 AM 06/24/2025               
show

## 2025-07-23 ENCOUNTER — PATIENT OUTREACH (OUTPATIENT)
Dept: ADMINISTRATIVE | Facility: HOSPITAL | Age: 35
End: 2025-07-23
Payer: MEDICAID

## 2025-07-23 NOTE — LETTER
AUTHORIZATION FOR RELEASE OF   CONFIDENTIAL INFORMATION    Dear Dr. Elena,    We are seeing Seun Wynne, date of birth 1990, in the clinic at [facility] INTERNAL MEDICINE. Karla Arce MD is the patient's PCP. Seun Wynne has an outstanding lab/procedure at the time we reviewed his chart. In order to help keep his health information updated, he has authorized us to request the following medical record(s):        (  )  MAMMOGRAM                                      (  )  COLONOSCOPY      (  )  PAP SMEAR                                          (  )  OUTSIDE LAB RESULTS     (  )  DEXA SCAN                                          ( x )  EYE EXAM            (  )  FOOT EXAM                                          (  )  ENTIRE RECORD     (  )  OUTSIDE IMMUNIZATIONS                 (  )  _______________         Please fax records to Karla Arce MD,  at [phone] or email to [email or web address].      Patient Name: Seun Wynne  : 1990  Patient Phone #: [phone]

## 2025-07-23 NOTE — LETTER
AUTHORIZATION FOR RELEASE OF   CONFIDENTIAL INFORMATION    Dear Dr. Elena,    We are seeing Seun Wynne, date of birth 1990, in the clinic at [facility] INTERNAL MEDICINE. Karla Arce MD is the patient's PCP. Seun Wynne has an outstanding lab/procedure at the time we reviewed his chart. In order to help keep his health information updated, he has authorized us to request the following medical record(s):        (  )  MAMMOGRAM                                      (  )  COLONOSCOPY      (  )  PAP SMEAR                                          (  )  OUTSIDE LAB RESULTS     (  )  DEXA SCAN                                          ( x )  EYE EXAM            (  )  FOOT EXAM                                          (  )  ENTIRE RECORD     (  )  OUTSIDE IMMUNIZATIONS                 (  )  _______________         Please fax records to Karla Arce MD,  at [phone] or email to [email or web address].          Patient Name: Seun Wynne  : 1990  Patient Phone #: [phone]

## 2025-08-12 ENCOUNTER — PATIENT OUTREACH (OUTPATIENT)
Dept: ADMINISTRATIVE | Facility: HOSPITAL | Age: 35
End: 2025-08-12
Payer: MEDICAID

## 2025-08-13 ENCOUNTER — OFFICE VISIT (OUTPATIENT)
Facility: CLINIC | Age: 35
End: 2025-08-13
Payer: MEDICAID

## 2025-08-13 VITALS
DIASTOLIC BLOOD PRESSURE: 75 MMHG | HEART RATE: 60 BPM | BODY MASS INDEX: 38.36 KG/M2 | WEIGHT: 315 LBS | HEIGHT: 76 IN | SYSTOLIC BLOOD PRESSURE: 113 MMHG

## 2025-08-13 DIAGNOSIS — F84.0 AUTISM SPECTRUM DISORDER: ICD-10-CM

## 2025-08-13 DIAGNOSIS — E11.59 HYPERTENSION ASSOCIATED WITH DIABETES: ICD-10-CM

## 2025-08-13 DIAGNOSIS — G43.709 CHRONIC MIGRAINE WITHOUT AURA WITHOUT STATUS MIGRAINOSUS, NOT INTRACTABLE: Primary | ICD-10-CM

## 2025-08-13 DIAGNOSIS — I15.2 HYPERTENSION ASSOCIATED WITH DIABETES: ICD-10-CM

## 2025-08-13 PROCEDURE — 3061F NEG MICROALBUMINURIA REV: CPT | Mod: CPTII,,,

## 2025-08-13 PROCEDURE — 1159F MED LIST DOCD IN RCRD: CPT | Mod: CPTII,,,

## 2025-08-13 PROCEDURE — 99999 PR PBB SHADOW E&M-EST. PATIENT-LVL III: CPT | Mod: PBBFAC,,,

## 2025-08-13 PROCEDURE — 3044F HG A1C LEVEL LT 7.0%: CPT | Mod: CPTII,,,

## 2025-08-13 PROCEDURE — 4010F ACE/ARB THERAPY RXD/TAKEN: CPT | Mod: CPTII,,,

## 2025-08-13 PROCEDURE — 99213 OFFICE O/P EST LOW 20 MIN: CPT | Mod: PBBFAC

## 2025-08-13 PROCEDURE — 3074F SYST BP LT 130 MM HG: CPT | Mod: CPTII,,,

## 2025-08-13 PROCEDURE — 3066F NEPHROPATHY DOC TX: CPT | Mod: CPTII,,,

## 2025-08-13 PROCEDURE — 3008F BODY MASS INDEX DOCD: CPT | Mod: CPTII,,,

## 2025-08-13 PROCEDURE — 99214 OFFICE O/P EST MOD 30 MIN: CPT | Mod: S$PBB,,,

## 2025-08-13 PROCEDURE — 3078F DIAST BP <80 MM HG: CPT | Mod: CPTII,,,

## 2025-08-13 PROCEDURE — 1160F RVW MEDS BY RX/DR IN RCRD: CPT | Mod: CPTII,,,

## 2025-08-13 RX ORDER — TOPIRAMATE 25 MG/1
25 TABLET, FILM COATED ORAL DAILY
Qty: 30 TABLET | Refills: 11 | Status: SHIPPED | OUTPATIENT
Start: 2025-08-13 | End: 2026-08-13

## 2025-08-13 RX ORDER — TOPIRAMATE 100 MG/1
100 TABLET, FILM COATED ORAL NIGHTLY
Qty: 30 TABLET | Refills: 11 | Status: SHIPPED | OUTPATIENT
Start: 2025-08-13 | End: 2026-08-13

## 2025-08-18 ENCOUNTER — PATIENT OUTREACH (OUTPATIENT)
Dept: ADMINISTRATIVE | Facility: HOSPITAL | Age: 35
End: 2025-08-18
Payer: MEDICAID

## 2025-08-18 VITALS — SYSTOLIC BLOOD PRESSURE: 113 MMHG | DIASTOLIC BLOOD PRESSURE: 75 MMHG
